# Patient Record
Sex: FEMALE | Race: WHITE | Employment: OTHER | URBAN - METROPOLITAN AREA
[De-identification: names, ages, dates, MRNs, and addresses within clinical notes are randomized per-mention and may not be internally consistent; named-entity substitution may affect disease eponyms.]

---

## 2021-10-26 ENCOUNTER — HOSPITAL ENCOUNTER (INPATIENT)
Age: 86
LOS: 11 days | Discharge: NURSING, REHAB OR LONG-TERM CARE | DRG: 291 | End: 2021-11-06
Attending: HOSPITALIST | Admitting: INTERNAL MEDICINE
Payer: MEDICARE

## 2021-10-26 ENCOUNTER — APPOINTMENT (OUTPATIENT)
Dept: GENERAL RADIOLOGY | Age: 86
DRG: 291 | End: 2021-10-26
Attending: NURSE PRACTITIONER
Payer: MEDICARE

## 2021-10-26 DIAGNOSIS — N13.30 HYDRONEPHROSIS, LEFT: Primary | ICD-10-CM

## 2021-10-26 PROBLEM — J96.01 ACUTE RESPIRATORY FAILURE WITH HYPOXIA (HCC): Status: ACTIVE | Noted: 2021-10-26

## 2021-10-26 LAB
ALBUMIN SERPL-MCNC: 2.9 G/DL (ref 3.5–5)
ALBUMIN/GLOB SERPL: 1 {RATIO} (ref 1.1–2.2)
ALP SERPL-CCNC: 64 U/L (ref 45–117)
ALT SERPL-CCNC: 117 U/L (ref 12–78)
ANION GAP SERPL CALC-SCNC: 5 MMOL/L (ref 5–15)
APTT PPP: 20.4 SEC (ref 22.1–31)
AST SERPL-CCNC: 22 U/L (ref 15–37)
ATRIAL RATE: 65 BPM
B PERT DNA SPEC QL NAA+PROBE: NOT DETECTED
BILIRUB SERPL-MCNC: 0.6 MG/DL (ref 0.2–1)
BNP SERPL-MCNC: ABNORMAL PG/ML
BORDETELLA PARAPERTUSSIS PCR, BORPAR: NOT DETECTED
BUN SERPL-MCNC: 44 MG/DL (ref 6–20)
BUN/CREAT SERPL: 28 (ref 12–20)
C PNEUM DNA SPEC QL NAA+PROBE: NOT DETECTED
CALCIUM SERPL-MCNC: 8.8 MG/DL (ref 8.5–10.1)
CALCULATED R AXIS, ECG10: 20 DEGREES
CALCULATED T AXIS, ECG11: 152 DEGREES
CHLORIDE SERPL-SCNC: 106 MMOL/L (ref 97–108)
CO2 SERPL-SCNC: 28 MMOL/L (ref 21–32)
CREAT SERPL-MCNC: 1.56 MG/DL (ref 0.55–1.02)
DIAGNOSIS, 93000: NORMAL
ERYTHROCYTE [DISTWIDTH] IN BLOOD BY AUTOMATED COUNT: 13 % (ref 11.5–14.5)
FLUAV H1 2009 PAND RNA SPEC QL NAA+PROBE: NOT DETECTED
FLUAV H1 RNA SPEC QL NAA+PROBE: NOT DETECTED
FLUAV H3 RNA SPEC QL NAA+PROBE: NOT DETECTED
FLUAV SUBTYP SPEC NAA+PROBE: NOT DETECTED
FLUBV RNA SPEC QL NAA+PROBE: NOT DETECTED
GLOBULIN SER CALC-MCNC: 2.8 G/DL (ref 2–4)
GLUCOSE BLD STRIP.AUTO-MCNC: 141 MG/DL (ref 65–117)
GLUCOSE BLD STRIP.AUTO-MCNC: 160 MG/DL (ref 65–117)
GLUCOSE BLD STRIP.AUTO-MCNC: 162 MG/DL (ref 65–117)
GLUCOSE BLD STRIP.AUTO-MCNC: 94 MG/DL (ref 65–117)
GLUCOSE SERPL-MCNC: 155 MG/DL (ref 65–100)
HADV DNA SPEC QL NAA+PROBE: NOT DETECTED
HCOV 229E RNA SPEC QL NAA+PROBE: NOT DETECTED
HCOV HKU1 RNA SPEC QL NAA+PROBE: NOT DETECTED
HCOV NL63 RNA SPEC QL NAA+PROBE: NOT DETECTED
HCOV OC43 RNA SPEC QL NAA+PROBE: NOT DETECTED
HCT VFR BLD AUTO: 34.6 % (ref 35–47)
HGB BLD-MCNC: 11.5 G/DL (ref 11.5–16)
HMPV RNA SPEC QL NAA+PROBE: NOT DETECTED
HPIV1 RNA SPEC QL NAA+PROBE: NOT DETECTED
HPIV2 RNA SPEC QL NAA+PROBE: NOT DETECTED
HPIV3 RNA SPEC QL NAA+PROBE: NOT DETECTED
HPIV4 RNA SPEC QL NAA+PROBE: NOT DETECTED
INR PPP: 1.1 (ref 0.9–1.1)
LACTATE SERPL-SCNC: 1.4 MMOL/L (ref 0.4–2)
M PNEUMO DNA SPEC QL NAA+PROBE: NOT DETECTED
MAGNESIUM SERPL-MCNC: 2.4 MG/DL (ref 1.6–2.4)
MCH RBC QN AUTO: 31.9 PG (ref 26–34)
MCHC RBC AUTO-ENTMCNC: 33.2 G/DL (ref 30–36.5)
MCV RBC AUTO: 96.1 FL (ref 80–99)
NRBC # BLD: 0 K/UL (ref 0–0.01)
NRBC BLD-RTO: 0 PER 100 WBC
PHOSPHATE SERPL-MCNC: 4.9 MG/DL (ref 2.6–4.7)
PLATELET # BLD AUTO: 101 K/UL (ref 150–400)
PMV BLD AUTO: 12.3 FL (ref 8.9–12.9)
POTASSIUM SERPL-SCNC: 4.6 MMOL/L (ref 3.5–5.1)
PROCALCITONIN SERPL-MCNC: 0.1 NG/ML
PROT SERPL-MCNC: 5.7 G/DL (ref 6.4–8.2)
PROTHROMBIN TIME: 11.9 SEC (ref 9–11.1)
Q-T INTERVAL, ECG07: 456 MS
QRS DURATION, ECG06: 158 MS
QTC CALCULATION (BEZET), ECG08: 516 MS
RBC # BLD AUTO: 3.6 M/UL (ref 3.8–5.2)
RSV RNA SPEC QL NAA+PROBE: NOT DETECTED
RV+EV RNA SPEC QL NAA+PROBE: NOT DETECTED
SARS-COV-2 PCR, COVPCR: NOT DETECTED
SERVICE CMNT-IMP: ABNORMAL
SERVICE CMNT-IMP: NORMAL
SODIUM SERPL-SCNC: 139 MMOL/L (ref 136–145)
THERAPEUTIC RANGE,PTTT: ABNORMAL SECS (ref 58–77)
TROPONIN-HIGH SENSITIVITY: 87 NG/L (ref 0–51)
VENTRICULAR RATE, ECG03: 77 BPM
WBC # BLD AUTO: 7.5 K/UL (ref 3.6–11)

## 2021-10-26 PROCEDURE — 84100 ASSAY OF PHOSPHORUS: CPT

## 2021-10-26 PROCEDURE — 83880 ASSAY OF NATRIURETIC PEPTIDE: CPT

## 2021-10-26 PROCEDURE — 83605 ASSAY OF LACTIC ACID: CPT

## 2021-10-26 PROCEDURE — 74011250637 HC RX REV CODE- 250/637: Performed by: NURSE PRACTITIONER

## 2021-10-26 PROCEDURE — 65660000000 HC RM CCU STEPDOWN

## 2021-10-26 PROCEDURE — 94660 CPAP INITIATION&MGMT: CPT

## 2021-10-26 PROCEDURE — 74011250636 HC RX REV CODE- 250/636: Performed by: NURSE PRACTITIONER

## 2021-10-26 PROCEDURE — 0202U NFCT DS 22 TRGT SARS-COV-2: CPT

## 2021-10-26 PROCEDURE — 71045 X-RAY EXAM CHEST 1 VIEW: CPT

## 2021-10-26 PROCEDURE — 93005 ELECTROCARDIOGRAM TRACING: CPT

## 2021-10-26 PROCEDURE — 74011636637 HC RX REV CODE- 636/637: Performed by: NURSE PRACTITIONER

## 2021-10-26 PROCEDURE — 85730 THROMBOPLASTIN TIME PARTIAL: CPT

## 2021-10-26 PROCEDURE — 36415 COLL VENOUS BLD VENIPUNCTURE: CPT

## 2021-10-26 PROCEDURE — 85610 PROTHROMBIN TIME: CPT

## 2021-10-26 PROCEDURE — 83735 ASSAY OF MAGNESIUM: CPT

## 2021-10-26 PROCEDURE — 82962 GLUCOSE BLOOD TEST: CPT

## 2021-10-26 PROCEDURE — 77010033678 HC OXYGEN DAILY

## 2021-10-26 PROCEDURE — 84484 ASSAY OF TROPONIN QUANT: CPT

## 2021-10-26 PROCEDURE — 85027 COMPLETE CBC AUTOMATED: CPT

## 2021-10-26 PROCEDURE — 80053 COMPREHEN METABOLIC PANEL: CPT

## 2021-10-26 PROCEDURE — 74011000258 HC RX REV CODE- 258: Performed by: INTERNAL MEDICINE

## 2021-10-26 PROCEDURE — 84145 PROCALCITONIN (PCT): CPT

## 2021-10-26 PROCEDURE — 74011250636 HC RX REV CODE- 250/636: Performed by: INTERNAL MEDICINE

## 2021-10-26 RX ORDER — ACETAMINOPHEN 650 MG/1
650 SUPPOSITORY RECTAL
Status: DISCONTINUED | OUTPATIENT
Start: 2021-10-26 | End: 2021-11-06 | Stop reason: HOSPADM

## 2021-10-26 RX ORDER — ZOLPIDEM TARTRATE 10 MG/1
TABLET ORAL
COMMUNITY
Start: 2021-09-17 | End: 2021-11-06

## 2021-10-26 RX ORDER — POLYETHYLENE GLYCOL 3350 17 G/17G
POWDER, FOR SOLUTION ORAL
COMMUNITY
Start: 2021-07-29

## 2021-10-26 RX ORDER — ALBUTEROL SULFATE 0.83 MG/ML
2.5 SOLUTION RESPIRATORY (INHALATION)
Status: DISCONTINUED | OUTPATIENT
Start: 2021-10-26 | End: 2021-11-06 | Stop reason: HOSPADM

## 2021-10-26 RX ORDER — POLYETHYLENE GLYCOL 3350 17 G/17G
17 POWDER, FOR SOLUTION ORAL DAILY PRN
Status: DISCONTINUED | OUTPATIENT
Start: 2021-10-26 | End: 2021-11-06 | Stop reason: HOSPADM

## 2021-10-26 RX ORDER — ONDANSETRON 2 MG/ML
4 INJECTION INTRAMUSCULAR; INTRAVENOUS
Status: DISCONTINUED | OUTPATIENT
Start: 2021-10-26 | End: 2021-11-06 | Stop reason: HOSPADM

## 2021-10-26 RX ORDER — POLYETHYLENE GLYCOL 3350 17 G/17G
17 POWDER, FOR SOLUTION ORAL DAILY
Status: DISCONTINUED | OUTPATIENT
Start: 2021-10-26 | End: 2021-11-06 | Stop reason: HOSPADM

## 2021-10-26 RX ORDER — ENOXAPARIN SODIUM 100 MG/ML
40 INJECTION SUBCUTANEOUS DAILY
Status: DISCONTINUED | OUTPATIENT
Start: 2021-10-26 | End: 2021-10-26

## 2021-10-26 RX ORDER — SODIUM CHLORIDE 0.9 % (FLUSH) 0.9 %
5-40 SYRINGE (ML) INJECTION AS NEEDED
Status: DISCONTINUED | OUTPATIENT
Start: 2021-10-26 | End: 2021-11-06 | Stop reason: HOSPADM

## 2021-10-26 RX ORDER — SODIUM CHLORIDE 0.9 % (FLUSH) 0.9 %
5-40 SYRINGE (ML) INJECTION EVERY 8 HOURS
Status: DISCONTINUED | OUTPATIENT
Start: 2021-10-26 | End: 2021-11-06 | Stop reason: HOSPADM

## 2021-10-26 RX ORDER — ATENOLOL 25 MG/1
25 TABLET ORAL DAILY
COMMUNITY
Start: 2021-10-01

## 2021-10-26 RX ORDER — PANTOPRAZOLE SODIUM 40 MG/1
40 TABLET, DELAYED RELEASE ORAL DAILY
Status: DISCONTINUED | OUTPATIENT
Start: 2021-10-26 | End: 2021-11-06 | Stop reason: HOSPADM

## 2021-10-26 RX ORDER — PANTOPRAZOLE SODIUM 40 MG/1
40 TABLET, DELAYED RELEASE ORAL DAILY
COMMUNITY
Start: 2021-10-18

## 2021-10-26 RX ORDER — IPRATROPIUM BROMIDE AND ALBUTEROL SULFATE 2.5; .5 MG/3ML; MG/3ML
3 SOLUTION RESPIRATORY (INHALATION)
Status: ON HOLD | COMMUNITY
Start: 2021-06-14 | End: 2021-11-04 | Stop reason: SDUPTHER

## 2021-10-26 RX ORDER — ESCITALOPRAM OXALATE 10 MG/1
10 TABLET ORAL DAILY
COMMUNITY
Start: 2021-09-28 | End: 2022-03-27

## 2021-10-26 RX ORDER — MONTELUKAST SODIUM 10 MG/1
10 TABLET ORAL AT BEDTIME
COMMUNITY
Start: 2021-01-07

## 2021-10-26 RX ORDER — FUROSEMIDE 20 MG/1
20 TABLET ORAL DAILY
Status: DISCONTINUED | OUTPATIENT
Start: 2021-10-27 | End: 2021-10-27

## 2021-10-26 RX ORDER — ALBUTEROL SULFATE 0.83 MG/ML
SOLUTION RESPIRATORY (INHALATION)
COMMUNITY
Start: 2021-08-31 | End: 2021-11-06

## 2021-10-26 RX ORDER — AMLODIPINE BESYLATE 5 MG/1
5 TABLET ORAL DAILY
COMMUNITY
Start: 2021-03-03 | End: 2021-11-06

## 2021-10-26 RX ORDER — ATENOLOL 25 MG/1
25 TABLET ORAL DAILY
Status: DISCONTINUED | OUTPATIENT
Start: 2021-10-26 | End: 2021-11-06 | Stop reason: HOSPADM

## 2021-10-26 RX ORDER — ALBUTEROL SULFATE 0.83 MG/ML
5 SOLUTION RESPIRATORY (INHALATION)
Status: DISCONTINUED | OUTPATIENT
Start: 2021-10-26 | End: 2021-10-26

## 2021-10-26 RX ORDER — FLUTICASONE FUROATE AND VILANTEROL TRIFENATATE 100; 25 UG/1; UG/1
1 POWDER RESPIRATORY (INHALATION) DAILY
COMMUNITY

## 2021-10-26 RX ORDER — ROPINIROLE 1 MG/1
3 TABLET, FILM COATED ORAL 3 TIMES DAILY
Status: DISCONTINUED | OUTPATIENT
Start: 2021-10-26 | End: 2021-11-06 | Stop reason: HOSPADM

## 2021-10-26 RX ORDER — VALSARTAN 320 MG/1
320 TABLET ORAL DAILY
COMMUNITY
Start: 2021-02-15 | End: 2021-11-06

## 2021-10-26 RX ORDER — INSULIN LISPRO 100 [IU]/ML
INJECTION, SOLUTION INTRAVENOUS; SUBCUTANEOUS EVERY 6 HOURS
Status: DISCONTINUED | OUTPATIENT
Start: 2021-10-26 | End: 2021-10-26

## 2021-10-26 RX ORDER — ROPINIROLE 3 MG/1
TABLET, FILM COATED ORAL
COMMUNITY
Start: 2020-12-29

## 2021-10-26 RX ORDER — MAGNESIUM SULFATE 100 %
4 CRYSTALS MISCELLANEOUS AS NEEDED
Status: DISCONTINUED | OUTPATIENT
Start: 2021-10-26 | End: 2021-11-06 | Stop reason: HOSPADM

## 2021-10-26 RX ORDER — FUROSEMIDE 20 MG/1
TABLET ORAL
Status: ON HOLD | COMMUNITY
Start: 2021-10-04 | End: 2021-10-26

## 2021-10-26 RX ORDER — ACETAMINOPHEN 325 MG/1
TABLET ORAL
COMMUNITY

## 2021-10-26 RX ORDER — MONTELUKAST SODIUM 10 MG/1
10 TABLET ORAL
Status: DISCONTINUED | OUTPATIENT
Start: 2021-10-26 | End: 2021-11-06 | Stop reason: HOSPADM

## 2021-10-26 RX ORDER — KETOCONAZOLE 20 MG/ML
SHAMPOO TOPICAL
COMMUNITY
Start: 2021-07-29

## 2021-10-26 RX ORDER — DEXTROSE 50 % IN WATER (D50W) INTRAVENOUS SYRINGE
12.5-25 AS NEEDED
Status: DISCONTINUED | OUTPATIENT
Start: 2021-10-26 | End: 2021-11-06 | Stop reason: HOSPADM

## 2021-10-26 RX ORDER — ONDANSETRON 4 MG/1
TABLET, FILM COATED ORAL
COMMUNITY
Start: 2021-07-22 | End: 2021-11-06

## 2021-10-26 RX ORDER — INSULIN LISPRO 100 [IU]/ML
INJECTION, SOLUTION INTRAVENOUS; SUBCUTANEOUS
Status: DISCONTINUED | OUTPATIENT
Start: 2021-10-26 | End: 2021-11-06 | Stop reason: HOSPADM

## 2021-10-26 RX ORDER — DICLOFENAC SODIUM 10 MG/G
GEL TOPICAL
COMMUNITY
Start: 2021-05-05

## 2021-10-26 RX ORDER — FUROSEMIDE 10 MG/ML
20 INJECTION INTRAMUSCULAR; INTRAVENOUS DAILY
Status: COMPLETED | OUTPATIENT
Start: 2021-10-26 | End: 2021-10-26

## 2021-10-26 RX ORDER — ACETAMINOPHEN 325 MG/1
650 TABLET ORAL
Status: DISCONTINUED | OUTPATIENT
Start: 2021-10-26 | End: 2021-11-06 | Stop reason: HOSPADM

## 2021-10-26 RX ADMIN — SODIUM CHLORIDE 10 ML: 9 INJECTION, SOLUTION INTRAMUSCULAR; INTRAVENOUS; SUBCUTANEOUS at 01:00

## 2021-10-26 RX ADMIN — INSULIN LISPRO 3 UNITS: 100 INJECTION, SOLUTION INTRAVENOUS; SUBCUTANEOUS at 12:12

## 2021-10-26 RX ADMIN — CEFEPIME HYDROCHLORIDE 2 G: 2 INJECTION, POWDER, FOR SOLUTION INTRAVENOUS at 03:31

## 2021-10-26 RX ADMIN — ATENOLOL 25 MG: 25 TABLET ORAL at 08:15

## 2021-10-26 RX ADMIN — APIXABAN 2.5 MG: 2.5 TABLET, FILM COATED ORAL at 22:33

## 2021-10-26 RX ADMIN — AZITHROMYCIN MONOHYDRATE 500 MG: 500 INJECTION, POWDER, LYOPHILIZED, FOR SOLUTION INTRAVENOUS at 03:34

## 2021-10-26 RX ADMIN — SODIUM CHLORIDE 10 ML: 9 INJECTION, SOLUTION INTRAMUSCULAR; INTRAVENOUS; SUBCUTANEOUS at 22:33

## 2021-10-26 RX ADMIN — METHYLPREDNISOLONE SODIUM SUCCINATE 60 MG: 40 INJECTION, POWDER, FOR SOLUTION INTRAMUSCULAR; INTRAVENOUS at 09:00

## 2021-10-26 RX ADMIN — ROPINIROLE HYDROCHLORIDE 3 MG: 1 TABLET, FILM COATED ORAL at 08:15

## 2021-10-26 RX ADMIN — ROPINIROLE HYDROCHLORIDE 3 MG: 1 TABLET, FILM COATED ORAL at 16:17

## 2021-10-26 RX ADMIN — ROPINIROLE HYDROCHLORIDE 3 MG: 1 TABLET, FILM COATED ORAL at 22:33

## 2021-10-26 RX ADMIN — FUROSEMIDE 20 MG: 10 INJECTION, SOLUTION INTRAMUSCULAR; INTRAVENOUS at 08:15

## 2021-10-26 RX ADMIN — MONTELUKAST 10 MG: 10 TABLET, FILM COATED ORAL at 22:33

## 2021-10-26 RX ADMIN — SODIUM CHLORIDE 10 ML: 9 INJECTION, SOLUTION INTRAMUSCULAR; INTRAVENOUS; SUBCUTANEOUS at 16:17

## 2021-10-26 RX ADMIN — PANTOPRAZOLE SODIUM 40 MG: 40 TABLET, DELAYED RELEASE ORAL at 08:15

## 2021-10-26 RX ADMIN — POLYETHYLENE GLYCOL 3350 17 G: 17 POWDER, FOR SOLUTION ORAL at 08:27

## 2021-10-26 RX ADMIN — APIXABAN 2.5 MG: 2.5 TABLET, FILM COATED ORAL at 08:13

## 2021-10-26 NOTE — PROGRESS NOTES
Problem: Pressure Injury - Risk of  Goal: *Prevention of pressure injury  Description: Document Malik Scale and appropriate interventions in the flowsheet. Outcome: Progressing Towards Goal  Note: Pressure Injury Interventions:  Sensory Interventions: Turn and reposition approx. every two hours (pillows and wedges if needed)    Moisture Interventions: Absorbent underpads, Check for incontinence Q2 hours and as needed, Maintain skin hydration (lotion/cream)    Activity Interventions: Pressure redistribution bed/mattress(bed type)    Mobility Interventions: PT/OT evaluation, Pressure redistribution bed/mattress (bed type), HOB 30 degrees or less    Nutrition Interventions: Document food/fluid/supplement intake    Friction and Shear Interventions: HOB 30 degrees or less                Problem: Risk for Spread of Infection  Goal: Prevent transmission of infectious organism to others  Description: Prevent the transmission of infectious organisms to other patients, staff members, and visitors.   Outcome: Progressing Towards Goal

## 2021-10-26 NOTE — PROGRESS NOTES
Pharmacy Medication Reconciliation     The patient was interviewed regarding current PTA medication list, use and drug allergies. The patient was questioned regarding use of any other inhalers, topical products, over the counter medications, herbal medications, vitamin products or ophthalmic/nasal/otic medication use. Allergy Update: Pcn [penicillins] and Sulfa (sulfonamide antibiotics)    Recommendations/Findings: The following amendments were made to the patient's active medication list on file at [address]:   1) Additions: acetaminophen, vitamin B-12    2) Deletions: furosemide     3) Changes: none      Pertinent Findings: patient did not know B-12 dose, and says she may take montelukast but is not completely sure    Clarified PTA med list with patient. PTA medication list was corrected to the following:     Prior to Admission Medications   Prescriptions Last Dose Informant Taking?   acetaminophen (TYLENOL) 325 mg tablet 10/25/2021 at Unknown time Self Yes   Sig: Take (1/2) tablet in the morning and (1/2) tablet at nights   albuterol (PROVENTIL VENTOLIN) 2.5 mg /3 mL (0.083 %) nebu 10/25/2021 at Unknown time Self Yes   Sig: INHALE 3ML BY NEBULIZATION 4 TIMES A DAY AS NEEDED   albuterol-ipratropium (DUO-NEB) 2.5 mg-0.5 mg/3 ml nebu 10/25/2021 at Unknown time Self Yes   Sig: Take 3 mL by inhalation. amLODIPine (NORVASC) 5 mg tablet 10/25/2021 at Unknown time Self Yes   Sig: Take 5 mg by mouth daily. apixaban (ELIQUIS) 5 mg tablet 10/25/2021 at Unknown time Self Yes   Sig: Take 5 mg by mouth two (2) times a day. atenoloL (TENORMIN) 25 mg tablet 10/25/2021 at Unknown time Self Yes   Sig: Take 25 mg by mouth daily. cyanocobalamin, vitamin B-12, (VITAMIN B12 PO) 10/25/2021 at Unknown time Self Yes   Sig: Take  by mouth. diclofenac (VOLTAREN) 1 % gel 9/26/2021 at Unknown time Self Yes   Sig: Apply  to affected area.    escitalopram oxalate (LEXAPRO) 10 mg tablet 10/19/2021 at Unknown time Self Yes   Sig: Take 10 mg by mouth daily. fluticasone furoate-vilanteroL (Breo Ellipta) 100-25 mcg/dose inhaler 10/25/2021 at Unknown time Self Yes   Sig: Take 1 Puff by inhalation daily. ketoconazole (NIZORAL) 2 % shampoo 10/19/2021 at Unknown time Self Yes   Sig: Shampoo with weekly 1-2 times. montelukast (SINGULAIR) 10 mg tablet 10/25/2021 at Unknown time Self Yes   Sig: Take 10 mg by mouth At bedtime. ondansetron hcl (ZOFRAN) 4 mg tablet Unknown at Unknown time Self No   Sig: TAKE 1 TABLET(4 MG) BY MOUTH EVERY 8 HOURS FOR UP TO 10 DAYS AS NEEDED FOR NAUSEA OR VOMITING   pantoprazole (PROTONIX) 40 mg tablet 10/19/2021 at Unknown time Self Yes   Sig: Take 40 mg by mouth daily. polyethylene glycol (MIRALAX) 17 gram/dose powder 2021 at Unknown time Self Yes   Si scoop po daily in a drink prn constipation. rOPINIRole (REQUIP) 3 mg tab tab 10/25/2021 at Unknown time Self Yes   Sig: TAKE 1 TABLET FOUR TIMES A DAY   valsartan (DIOVAN) 320 mg tablet 10/25/2021 at Unknown time Self Yes   Sig: Take 320 mg by mouth daily. zolpidem (AMBIEN) 10 mg tablet 10/25/2021 at Unknown time Self Yes   Si/2 to 1 po qhs prn insomnia. Facility-Administered Medications: None        Thank you,  Jamar Staton PharmD.  Candidate

## 2021-10-26 NOTE — PROGRESS NOTES
Transition of Care Plan:    RUR:  12%   Disposition: Home with Son who provides 24/7 supervision  Follow up appointments: PCP, Specialists  DME needed: Pt has a walker. Transportation at Discharge: Pt's sonwill transport. Keys or means to access home:      Son will provide. IM Medicare Letter: needed at d/c  Is patient a BCPI-A Bundle:    n/a       If yes, was Bundle Letter given?:   n/a  Caregiver Contact:Micheal Stanforddayne [phone]  Discharge Caregiver contacted prior to discharge? CM will contact prior to d/c.    Reason for Admission:  Acute Respiratory Failure with Hypoxia, Atrial Fibrillation, Community Acquired Pneumonia, and Heart Failure Exacerbation                     RUR Score: 12%                      Plan for utilizing home health:  As needed        PCP: First and Last name:  Pat Ge MD     Name of Practice:    Are you a current patient: Yes/No: yes   Approximate date of last visit: last week    Can you participate in a virtual visit with your PCP: Prefers in office visits                    Current Advanced Directive/Advance Care Plan: DNR    Laura 13 (ACP) Conversation      Date of Conversation: 10/26/21  Conducted with: Patient with Decision Making Capacity and Healthcare Decision Maker: Named in Advance Directive or Healthcare Power of  Chap Salomon:   No healthcare decision makers have been documented. Click here to complete [address] including selection of the Healthcare Decision Maker Relationship (ie \"Primary\")    Content/Action Overview:    Has ACP document(s) NOT on file - requested patient to provide  Reviewed DNR/DNI and patient confirms current DNR status - completed forms on file (place new order if needed)    Length of Voluntary ACP Conversation in minutes:  <16 minutes (Non-Billable)    Dalia Greenwood                           Transition of Care Plan:   Home with son who provides 24/7 supervision. CM met with pt's son via phone to introduce self/role, verify demographics, insurance and PCP. CM also discussed d/c plan. Pt is a 79 yo, , , female who was admitted to [facility] on 10/26/21 with the above dxs. Pt sees her PCP every 3 months. Pt obtains prescriptions from the [address] [place] [address]. Pt lives with her son in a one fl home with 3 BOO. Pt also has a supportive great nephew who lives nearby. Pt uses a walker. Pt does not drive. Pt needs assistance with her ADL care. Pt has a hx of HH through [facility]. Pt has a hx of SNF at [facility]. Pt has not been in an IPR. Pt's son will transport at d/c. CM will continue to assess for d/c needs. Care Management Interventions  PCP Verified by CM: Yes (Pt sees Dr. Cristiano Sutton. )  Palliative Care Criteria Met (RRAT>21 & CHF Dx)?: No  Mode of Transport at Discharge:  Other (see comment) (Pt's godson will transport at d/c.)  Transition of Care Consult (CM Consult): Discharge Planning  Discharge Durable Medical Equipment: No (Pt has a walker. )  Physical Therapy Consult: No  Occupational Therapy Consult: No  Speech Therapy Consult: No  Support Systems: Child(james), Other Family Member(s) (Pt has a supportive godson and great nephew. )  Confirm Follow Up Transport: Family  The Patient and/or Patient Representative was Provided with a Choice of Provider and Agrees with the Discharge Plan?: Yes  Name of the Patient Representative Who was Provided with a Choice of Provider and Agrees with the Discharge Plan: Skylar Martines  Discharge Location  Discharge Placement: Home with family assistance    VICK Hewitt  Care Management, [address] [place] [address]

## 2021-10-26 NOTE — PROGRESS NOTES
Pt is now comfortable on NC O2 @ 2 LPM. Cognition appears to be intact. Exam reveals AF with controlled rate (71/min), no wheezes, bibasilar crackles, symmetric LE edema. Se can now be safely transferred to [facility] floor. I have discontinued systemic steroids (doubt that this is COPD exacerbation) and antibiotics (doubt PNA). I have changed furosemide to PO dosing. After transfer, [facility] will assume her care and PCCM will sign off.  Discussed with Dr Elida Dinh, [facility],[address] Floor  [phone]  10/26/2021 12:06 PM

## 2021-10-26 NOTE — PROGRESS NOTES
80 y.o. female who has a PMH of HF (EF unknown), (COPD, stage unknown, does not wear oxygen at home), Afib with RVR, CKD stage 3, Previous TAVR, anxiety, HLD, and DM. She presented to the ED at [facility] with shortness of breath that had been worsening over a period of about 1 week accompanied by increasing BLE edema. She demonstrated oxygen saturations to the mid 80s that improved on NRB. She continued to exhibit increased WOB and was placed on BiPAP. She was given lasix, methylpred, levofloxacin, and ceftriaxone. COVID negative w/ blood cultures & full respiratory panel pending. Pt received on bipap @ 45%; A & 0 x4; *1 PIV; PureWick. COVID negative w/ blood cultures & full respiratory panel pending; NPO. Pt is resting comfortably in bed. Shift report included the following information SBAR, Kardex, ED Summary, Procedure Summary, Intake/Output, MAR, Accordion, Recent Results, Med Rec Status, Cardiac Rhythm and alarm parameters. BG checks/insulin administration per orders. No acute events overnight. Continue to monitor.

## 2021-10-26 NOTE — PROGRESS NOTES
Shift Summary:  Received in bed, asleep, BIPAP in place. Patient reports feeling better, states lives with friend Harvinder Marks who she identifies as caregiver. Patient ambulates with walker, states she does most ADLS sitting down. Takes her medication with apple sauce and eats a regular diet. Bedside swallow exam completed, patient able to swallow liquids and solids, no cough, drooling or distress noted. Medications administered as ordered. Patient tolerated well. Patient transitioned to NC per MD orders, tolerated well. OOB to chair for approximately 3 hours. Loose stools noted, patient received Miralax this AM, she peck snot usually take anything for her bowels. MD advised and scheduled Miralax held. Patient able to transfer in and out of bed with one person assistance. Able to feed self, requires set-up assistance. Visiting with family at this time. Saturating in 90's on RA.

## 2021-10-26 NOTE — PROGRESS NOTES
apixaban 5 mg po bid for afib,  Pt age=89,  Pt wt=76.3 kg,  Pt scr=1.56.   apixaban dosing adjusted to 2.5 mg po bid as per protocol.  Rai Ng, ANNID

## 2021-10-26 NOTE — H&P
History and Physical    Patient: Sheila Ball MRN: [id number]  SSN: [id number]    YOB: 1927  Age: 80 y.o. Sex: female      Subjective:      Sheila Ball is a 80 y.o. female who has a PMH of HF (EF unknown), (COPD, stage unknown, does not wear oxygen at home), Afib with RVR, CKD stage 3, Previous TAVR, anxiety, HLD, and DM. She presented to the ED at [facility] with shortness of breath that had been worsening over a period of about 1 week accompanied by increasing BLE edema. She demonstrated oxygen saturations to the mid 80s that improved on NRB. She continued to exhibit increased WOB and was placed on BiPAP. She was given lasix, methylpred, levofloxacin, and ceftriaxone. She was transferred to [facility] for further workup and management. Upon arrival, I queried the patient about code status and she informed me that she has an advanced directive stating that she is a DNR/DNI. She does not have the advanced directive on hand, but does wish to be a DNR/DNI. The patient arrived on BiPAP 40% 10/5 with sats %. She reported her WOB was improved. Her breathing did not appear labored. She was not tachypneic. VSS. Medications reviewed. PMH: As per HPI  PSH: Hip arthroplasty 6/2001; Hysterectomy; prior joint replacement; TAVR 1/2019    PFH: HTN/Kidney disease- father    Social History     Tobacco Use    Smoking status: Not on file   Substance Use Topics    Alcohol use: Not on file      Prior to Admission medications    Medication Sig Start Date End Date Taking? Authorizing Provider   albuterol (PROVENTIL VENTOLIN) 2.5 mg /3 mL (0.083 %) nebu INHALE 3ML BY NEBULIZATION 4 TIMES A DAY AS NEEDED 8/31/21  Yes Provider, Historical   amLODIPine (NORVASC) 5 mg tablet Take 5 mg by mouth daily. 3/3/21  Yes Provider, Historical   atenoloL (TENORMIN) 25 mg tablet Take 25 mg by mouth daily. 10/1/21  Yes Provider, Historical   diclofenac (VOLTAREN) 1 % gel Apply  to affected area.  5/5/21 Yes Provider, Historical   escitalopram oxalate (LEXAPRO) 10 mg tablet Take 10 mg by mouth daily. 9/28/21 3/27/22 Yes Provider, Historical   furosemide (LASIX) 20 mg tablet TAKE 1 TABLET DAILY ON MONDAY, WEDNESDAY AND FRIDAY OR AS DIRECTED 10/4/21  Yes Provider, Historical   albuterol-ipratropium (DUO-NEB) 2.5 mg-0.5 mg/3 ml nebu Take 3 mL by inhalation. 6/14/21 6/14/22 Yes Provider, Historical   ketoconazole (NIZORAL) 2 % shampoo Shampoo with weekly 1-2 times. 7/29/21  Yes Provider, Historical   montelukast (SINGULAIR) 10 mg tablet Take 10 mg by mouth At bedtime. 1/7/21  Yes Provider, Historical   ondansetron hcl (ZOFRAN) 4 mg tablet TAKE 1 TABLET(4 MG) BY MOUTH EVERY 8 HOURS FOR UP TO 10 DAYS AS NEEDED FOR NAUSEA OR VOMITING 7/22/21  Yes Provider, Historical   pantoprazole (PROTONIX) 40 mg tablet Take 40 mg by mouth daily. 10/18/21  Yes Provider, Historical   polyethylene glycol (MIRALAX) 17 gram/dose powder 1 scoop po daily in a drink prn constipation. 7/29/21  Yes Provider, Historical   rOPINIRole (REQUIP) 3 mg tab tab TAKE 1 TABLET FOUR TIMES A DAY 12/29/20  Yes Provider, Historical   valsartan (DIOVAN) 320 mg tablet Take 320 mg by mouth daily. 2/15/21  Yes Provider, Historical   zolpidem (AMBIEN) 10 mg tablet 1/2 to 1 po qhs prn insomnia. 9/17/21  Yes Provider, Historical   apixaban (ELIQUIS) 5 mg tablet Take 5 mg by mouth two (2) times a day.    Yes Provider, Historical     Review of Systems:  As per HPI    Objective:     Vitals:    10/26/21 0032 10/26/21 0040   BP:  138/63   Pulse:  76   Resp:  19   Temp:  98.4 °F (36.9 °C)   SpO2: 99% 99%      Physical Exam:  GENERAL: alert, cooperative, no distress, appears stated age  EYE: PERRLA  THROAT & NECK: Supple, no JVD  LUNG: On BiPAP, CTAP, no wheezing  HEART: Irregular rate and rhythm, 2+ pulses, 2+ edema of BLE to thighs  ABDOMEN: soft, nontender, nondistended  EXTREMITIES:  2+ pulses, 2+ edema of BLE  SKIN:c/d/i  NEUROLOGIC: Awake, alert, oriented x4; nonfocal  PSYCHIATRIC: Non-anxious appearing, situationally appropriate, conversant     [facility] labs: Pending    [facility] labs:  WBC 12  H/H 12.9/39  Plt 123    K 4  BUN/Cr 42/1.47    CXR: Ordered and pending    RVP: Ordered and pending    ECG at OSH: Afib with controlled rate  ECG at [address]: Pending    Assessment and Plan:     Acute Hypoxic Respiratory Failure: Leading ddx includes COPD exacerbation and HF w exacerbation:  - Awaiting BNP/Trop levels  - Will likely require additional diuresis; awaiting BMP  - Follow up admission CXR; pending  - Follow up RVP  - Empiric cefepime and azithro   - Blood cultures   - Plan to continue BiPAP overnight    Afib:   - Continue BB  - Continue eliquis    CKD stage 3: BUN/Cr 42/1.47.  - BMP pending     Hyperglycemia:   - SSI    GERD: PPI    Restless leg syndrome: Continue requip    Deconditioning: PT/OT when able    Rosaholly Suleman 23  I had a face to face encounter with the patient, reviewed and interpreted patient data including clinical events, labs, images, vital signs, I/O's, and examined patient. I have discussed the case and the plan and management of the patient's care with the consulting services, the bedside nurses and the respiratory therapist.     NOTE OF PERSONAL INVOLVEMENT IN CARE   This patient has a high probability of imminent, clinically significant deterioration, which requires the highest level of preparedness to intervene urgently. I participated in the decision-making and personally managed or directed the management of the following life and organ supporting interventions that required my frequent assessment to treat or prevent imminent deterioration. I personally spent 60 minutes of critical care time. This is time spent at this critically ill patient's bedside actively involved in patient care as well as the coordination of care and discussions with the patient's family.   This does not include any procedural time which has been billed separately.     Bennett Alexander NP  [facility]  10/26/2021        Signed By: Bennett Alexander NP     October 26, 2021

## 2021-10-27 ENCOUNTER — APPOINTMENT (OUTPATIENT)
Dept: GENERAL RADIOLOGY | Age: 86
DRG: 291 | End: 2021-10-27
Attending: INTERNAL MEDICINE
Payer: MEDICARE

## 2021-10-27 ENCOUNTER — APPOINTMENT (OUTPATIENT)
Dept: ULTRASOUND IMAGING | Age: 86
DRG: 291 | End: 2021-10-27
Attending: INTERNAL MEDICINE
Payer: MEDICARE

## 2021-10-27 LAB
ANION GAP SERPL CALC-SCNC: 6 MMOL/L (ref 5–15)
APPEARANCE UR: CLEAR
BACTERIA URNS QL MICRO: NEGATIVE /HPF
BASOPHILS # BLD: 0 K/UL (ref 0–0.1)
BASOPHILS # BLD: 0 K/UL (ref 0–0.1)
BASOPHILS NFR BLD: 0 % (ref 0–1)
BASOPHILS NFR BLD: 0 % (ref 0–1)
BILIRUB UR QL: NEGATIVE
BNP SERPL-MCNC: ABNORMAL PG/ML
BUN SERPL-MCNC: 50 MG/DL (ref 6–20)
BUN/CREAT SERPL: 33 (ref 12–20)
CALCIUM SERPL-MCNC: 8.7 MG/DL (ref 8.5–10.1)
CALCULATED R AXIS, ECG10: 11 DEGREES
CALCULATED T AXIS, ECG11: 180 DEGREES
CHLORIDE SERPL-SCNC: 106 MMOL/L (ref 97–108)
CO2 SERPL-SCNC: 28 MMOL/L (ref 21–32)
COLOR UR: NORMAL
CREAT SERPL-MCNC: 1.51 MG/DL (ref 0.55–1.02)
DIAGNOSIS, 93000: NORMAL
DIFFERENTIAL METHOD BLD: ABNORMAL
DIFFERENTIAL METHOD BLD: ABNORMAL
EOSINOPHIL # BLD: 0 K/UL (ref 0–0.4)
EOSINOPHIL # BLD: 0 K/UL (ref 0–0.4)
EOSINOPHIL NFR BLD: 0 % (ref 0–7)
EOSINOPHIL NFR BLD: 0 % (ref 0–7)
EPITH CASTS URNS QL MICRO: NORMAL /LPF
ERYTHROCYTE [DISTWIDTH] IN BLOOD BY AUTOMATED COUNT: 13.2 % (ref 11.5–14.5)
ERYTHROCYTE [DISTWIDTH] IN BLOOD BY AUTOMATED COUNT: 13.3 % (ref 11.5–14.5)
GLUCOSE BLD STRIP.AUTO-MCNC: 131 MG/DL (ref 65–117)
GLUCOSE BLD STRIP.AUTO-MCNC: 173 MG/DL (ref 65–117)
GLUCOSE BLD STRIP.AUTO-MCNC: 176 MG/DL (ref 65–117)
GLUCOSE BLD STRIP.AUTO-MCNC: 225 MG/DL (ref 65–117)
GLUCOSE SERPL-MCNC: 135 MG/DL (ref 65–100)
GLUCOSE UR STRIP.AUTO-MCNC: NEGATIVE MG/DL
HCT VFR BLD AUTO: 34.8 % (ref 35–47)
HCT VFR BLD AUTO: 44.5 % (ref 35–47)
HGB BLD-MCNC: 11.4 G/DL (ref 11.5–16)
HGB BLD-MCNC: 14.6 G/DL (ref 11.5–16)
HGB UR QL STRIP: NEGATIVE
IMM GRANULOCYTES # BLD AUTO: 0.1 K/UL (ref 0–0.04)
IMM GRANULOCYTES # BLD AUTO: 0.2 K/UL (ref 0–0.04)
IMM GRANULOCYTES NFR BLD AUTO: 1 % (ref 0–0.5)
IMM GRANULOCYTES NFR BLD AUTO: 1 % (ref 0–0.5)
KETONES UR QL STRIP.AUTO: NEGATIVE MG/DL
LACTATE SERPL-SCNC: 2 MMOL/L (ref 0.4–2)
LEUKOCYTE ESTERASE UR QL STRIP.AUTO: NEGATIVE
LYMPHOCYTES # BLD: 1 K/UL (ref 0.8–3.5)
LYMPHOCYTES # BLD: 2.6 K/UL (ref 0.8–3.5)
LYMPHOCYTES NFR BLD: 14 % (ref 12–49)
LYMPHOCYTES NFR BLD: 9 % (ref 12–49)
MAGNESIUM SERPL-MCNC: 2.4 MG/DL (ref 1.6–2.4)
MCH RBC QN AUTO: 31.4 PG (ref 26–34)
MCH RBC QN AUTO: 31.7 PG (ref 26–34)
MCHC RBC AUTO-ENTMCNC: 32.8 G/DL (ref 30–36.5)
MCHC RBC AUTO-ENTMCNC: 32.8 G/DL (ref 30–36.5)
MCV RBC AUTO: 95.7 FL (ref 80–99)
MCV RBC AUTO: 96.7 FL (ref 80–99)
MONOCYTES # BLD: 0.7 K/UL (ref 0–1)
MONOCYTES # BLD: 1.3 K/UL (ref 0–1)
MONOCYTES NFR BLD: 6 % (ref 5–13)
MONOCYTES NFR BLD: 7 % (ref 5–13)
NEUTS SEG # BLD: 14.3 K/UL (ref 1.8–8)
NEUTS SEG # BLD: 9.2 K/UL (ref 1.8–8)
NEUTS SEG NFR BLD: 78 % (ref 32–75)
NEUTS SEG NFR BLD: 84 % (ref 32–75)
NITRITE UR QL STRIP.AUTO: NEGATIVE
NRBC # BLD: 0 K/UL (ref 0–0.01)
NRBC # BLD: 0 K/UL (ref 0–0.01)
NRBC BLD-RTO: 0 PER 100 WBC
NRBC BLD-RTO: 0 PER 100 WBC
PH UR STRIP: 5 [PH] (ref 5–8)
PLATELET # BLD AUTO: 111 K/UL (ref 150–400)
PLATELET # BLD AUTO: 161 K/UL (ref 150–400)
PMV BLD AUTO: 11.9 FL (ref 8.9–12.9)
PMV BLD AUTO: 12.3 FL (ref 8.9–12.9)
POTASSIUM SERPL-SCNC: 4.4 MMOL/L (ref 3.5–5.1)
PROCALCITONIN SERPL-MCNC: 0.27 NG/ML
PROT UR STRIP-MCNC: NEGATIVE MG/DL
Q-T INTERVAL, ECG07: 360 MS
QRS DURATION, ECG06: 160 MS
QTC CALCULATION (BEZET), ECG08: 493 MS
RBC # BLD AUTO: 3.6 M/UL (ref 3.8–5.2)
RBC # BLD AUTO: 4.65 M/UL (ref 3.8–5.2)
RBC #/AREA URNS HPF: NORMAL /HPF (ref 0–5)
RBC MORPH BLD: ABNORMAL
SERVICE CMNT-IMP: ABNORMAL
SODIUM SERPL-SCNC: 140 MMOL/L (ref 136–145)
SP GR UR REFRACTOMETRY: 1.01 (ref 1–1.03)
UA: UC IF INDICATED,UAUC: NORMAL
UROBILINOGEN UR QL STRIP.AUTO: 0.2 EU/DL (ref 0.2–1)
VENTRICULAR RATE, ECG03: 113 BPM
WBC # BLD AUTO: 11.1 K/UL (ref 3.6–11)
WBC # BLD AUTO: 18.4 K/UL (ref 3.6–11)
WBC MORPH BLD: ABNORMAL
WBC URNS QL MICRO: NORMAL /HPF (ref 0–4)

## 2021-10-27 PROCEDURE — 74011250637 HC RX REV CODE- 250/637: Performed by: INTERNAL MEDICINE

## 2021-10-27 PROCEDURE — 85025 COMPLETE CBC W/AUTO DIFF WBC: CPT

## 2021-10-27 PROCEDURE — 82962 GLUCOSE BLOOD TEST: CPT

## 2021-10-27 PROCEDURE — 36415 COLL VENOUS BLD VENIPUNCTURE: CPT

## 2021-10-27 PROCEDURE — 93005 ELECTROCARDIOGRAM TRACING: CPT

## 2021-10-27 PROCEDURE — 87040 BLOOD CULTURE FOR BACTERIA: CPT

## 2021-10-27 PROCEDURE — 65660000000 HC RM CCU STEPDOWN

## 2021-10-27 PROCEDURE — 74011000250 HC RX REV CODE- 250: Performed by: NURSE PRACTITIONER

## 2021-10-27 PROCEDURE — 77010033678 HC OXYGEN DAILY

## 2021-10-27 PROCEDURE — 74011636637 HC RX REV CODE- 636/637: Performed by: NURSE PRACTITIONER

## 2021-10-27 PROCEDURE — 74011250636 HC RX REV CODE- 250/636: Performed by: NURSE PRACTITIONER

## 2021-10-27 PROCEDURE — 74011250637 HC RX REV CODE- 250/637: Performed by: NURSE PRACTITIONER

## 2021-10-27 PROCEDURE — 80048 BASIC METABOLIC PNL TOTAL CA: CPT

## 2021-10-27 PROCEDURE — 74011250636 HC RX REV CODE- 250/636: Performed by: INTERNAL MEDICINE

## 2021-10-27 PROCEDURE — 94640 AIRWAY INHALATION TREATMENT: CPT

## 2021-10-27 PROCEDURE — 76770 US EXAM ABDO BACK WALL COMP: CPT

## 2021-10-27 PROCEDURE — 81001 URINALYSIS AUTO W/SCOPE: CPT

## 2021-10-27 PROCEDURE — 71045 X-RAY EXAM CHEST 1 VIEW: CPT

## 2021-10-27 PROCEDURE — 84145 PROCALCITONIN (PCT): CPT

## 2021-10-27 PROCEDURE — 74011000250 HC RX REV CODE- 250: Performed by: INTERNAL MEDICINE

## 2021-10-27 PROCEDURE — 83605 ASSAY OF LACTIC ACID: CPT

## 2021-10-27 PROCEDURE — 83880 ASSAY OF NATRIURETIC PEPTIDE: CPT

## 2021-10-27 PROCEDURE — 83735 ASSAY OF MAGNESIUM: CPT

## 2021-10-27 RX ORDER — FUROSEMIDE 10 MG/ML
40 INJECTION INTRAMUSCULAR; INTRAVENOUS 2 TIMES DAILY
Status: DISCONTINUED | OUTPATIENT
Start: 2021-10-27 | End: 2021-11-01

## 2021-10-27 RX ORDER — MORPHINE SULFATE 2 MG/ML
1 INJECTION, SOLUTION INTRAMUSCULAR; INTRAVENOUS
Status: DISCONTINUED | OUTPATIENT
Start: 2021-10-27 | End: 2021-11-06 | Stop reason: HOSPADM

## 2021-10-27 RX ORDER — LANOLIN ALCOHOL/MO/W.PET/CERES
6 CREAM (GRAM) TOPICAL
Status: DISCONTINUED | OUTPATIENT
Start: 2021-10-27 | End: 2021-11-06 | Stop reason: HOSPADM

## 2021-10-27 RX ORDER — DILTIAZEM HYDROCHLORIDE 5 MG/ML
5 INJECTION INTRAVENOUS ONCE
Status: COMPLETED | OUTPATIENT
Start: 2021-10-27 | End: 2021-10-27

## 2021-10-27 RX ORDER — METOPROLOL TARTRATE 5 MG/5ML
5 INJECTION INTRAVENOUS
Status: DISCONTINUED | OUTPATIENT
Start: 2021-10-27 | End: 2021-11-06 | Stop reason: HOSPADM

## 2021-10-27 RX ORDER — ARFORMOTEROL TARTRATE 15 UG/2ML
15 SOLUTION RESPIRATORY (INHALATION)
Status: DISCONTINUED | OUTPATIENT
Start: 2021-10-27 | End: 2021-11-06 | Stop reason: HOSPADM

## 2021-10-27 RX ORDER — LIDOCAINE 4 G/100G
1 PATCH TOPICAL EVERY 24 HOURS
Status: DISCONTINUED | OUTPATIENT
Start: 2021-10-27 | End: 2021-11-06 | Stop reason: HOSPADM

## 2021-10-27 RX ORDER — BUDESONIDE 0.25 MG/2ML
250 INHALANT ORAL
Status: DISCONTINUED | OUTPATIENT
Start: 2021-10-27 | End: 2021-11-06 | Stop reason: HOSPADM

## 2021-10-27 RX ADMIN — APIXABAN 2.5 MG: 2.5 TABLET, FILM COATED ORAL at 09:01

## 2021-10-27 RX ADMIN — ROPINIROLE HYDROCHLORIDE 3 MG: 1 TABLET, FILM COATED ORAL at 18:52

## 2021-10-27 RX ADMIN — MORPHINE SULFATE 1 MG: 2 INJECTION, SOLUTION INTRAMUSCULAR; INTRAVENOUS at 14:42

## 2021-10-27 RX ADMIN — INSULIN LISPRO 4 UNITS: 100 INJECTION, SOLUTION INTRAVENOUS; SUBCUTANEOUS at 17:57

## 2021-10-27 RX ADMIN — ROPINIROLE HYDROCHLORIDE 3 MG: 1 TABLET, FILM COATED ORAL at 09:40

## 2021-10-27 RX ADMIN — MORPHINE SULFATE 1 MG: 2 INJECTION, SOLUTION INTRAMUSCULAR; INTRAVENOUS at 17:58

## 2021-10-27 RX ADMIN — MORPHINE SULFATE 1 MG: 2 INJECTION, SOLUTION INTRAMUSCULAR; INTRAVENOUS at 23:04

## 2021-10-27 RX ADMIN — PANTOPRAZOLE SODIUM 40 MG: 40 TABLET, DELAYED RELEASE ORAL at 09:01

## 2021-10-27 RX ADMIN — INSULIN LISPRO 3 UNITS: 100 INJECTION, SOLUTION INTRAVENOUS; SUBCUTANEOUS at 11:36

## 2021-10-27 RX ADMIN — ROPINIROLE HYDROCHLORIDE 3 MG: 1 TABLET, FILM COATED ORAL at 23:19

## 2021-10-27 RX ADMIN — DILTIAZEM HYDROCHLORIDE 5 MG: 5 INJECTION INTRAVENOUS at 13:13

## 2021-10-27 RX ADMIN — FUROSEMIDE 40 MG: 10 INJECTION, SOLUTION INTRAMUSCULAR; INTRAVENOUS at 17:59

## 2021-10-27 RX ADMIN — APIXABAN 2.5 MG: 2.5 TABLET, FILM COATED ORAL at 23:04

## 2021-10-27 RX ADMIN — ARFORMOTEROL TARTRATE 15 MCG: 15 SOLUTION RESPIRATORY (INHALATION) at 19:37

## 2021-10-27 RX ADMIN — FUROSEMIDE 20 MG: 20 TABLET ORAL at 09:01

## 2021-10-27 RX ADMIN — ATENOLOL 25 MG: 25 TABLET ORAL at 09:01

## 2021-10-27 RX ADMIN — FUROSEMIDE 40 MG: 10 INJECTION, SOLUTION INTRAMUSCULAR; INTRAVENOUS at 13:06

## 2021-10-27 RX ADMIN — AZITHROMYCIN MONOHYDRATE 500 MG: 500 INJECTION, POWDER, LYOPHILIZED, FOR SOLUTION INTRAVENOUS at 16:00

## 2021-10-27 RX ADMIN — MONTELUKAST 10 MG: 10 TABLET, FILM COATED ORAL at 23:04

## 2021-10-27 RX ADMIN — SODIUM CHLORIDE 10 ML: 9 INJECTION, SOLUTION INTRAMUSCULAR; INTRAVENOUS; SUBCUTANEOUS at 23:19

## 2021-10-27 RX ADMIN — ONDANSETRON 4 MG: 2 INJECTION INTRAMUSCULAR; INTRAVENOUS at 17:59

## 2021-10-27 RX ADMIN — METHYLPREDNISOLONE SODIUM SUCCINATE 40 MG: 40 INJECTION, POWDER, FOR SOLUTION INTRAMUSCULAR; INTRAVENOUS at 14:42

## 2021-10-27 RX ADMIN — BUDESONIDE 250 MCG: 0.25 INHALANT RESPIRATORY (INHALATION) at 19:37

## 2021-10-27 RX ADMIN — ONDANSETRON 4 MG: 2 INJECTION INTRAMUSCULAR; INTRAVENOUS at 11:49

## 2021-10-27 RX ADMIN — METHYLPREDNISOLONE SODIUM SUCCINATE 40 MG: 40 INJECTION, POWDER, FOR SOLUTION INTRAMUSCULAR; INTRAVENOUS at 23:04

## 2021-10-27 RX ADMIN — SODIUM CHLORIDE 10 ML: 9 INJECTION, SOLUTION INTRAMUSCULAR; INTRAVENOUS; SUBCUTANEOUS at 13:16

## 2021-10-27 RX ADMIN — SODIUM CHLORIDE 10 ML: 9 INJECTION, SOLUTION INTRAMUSCULAR; INTRAVENOUS; SUBCUTANEOUS at 03:21

## 2021-10-27 NOTE — PROGRESS NOTES
1150: patient stated she is feeling worse than yesterday. Feels more SOB, sats stable on 2L. Crackles bilateral, unchanged from this morning. Now with new back pain to Left side and nausea. MD notified, waiting for call back.

## 2021-10-27 NOTE — PROGRESS NOTES
TRANSFER - OUT REPORT:    Verbal report given to JESUS Boswell(name) on [facility]  being transferred to U 2264(unit) for routine progression of care       Report consisted of patients Situation, Background, Assessment and   Recommendations(SBAR). Information from the following report(s) SBAR, Kardex, Intake/Output, MAR, Recent Results and Cardiac Rhythm Afib with frequent PVC's was reviewed with the receiving nurse. Lines:   Peripheral IV 10/26/21 Anterior;Left;Proximal Forearm (Active)   Site Assessment Clean, dry, & intact 10/27/21 0324   Phlebitis Assessment 0 10/27/21 0324   Infiltration Assessment 0 10/27/21 0324   Dressing Status Clean, dry, & intact 10/27/21 0324   Dressing Type Transparent 10/27/21 0324   Hub Color/Line Status Blue 10/27/21 0324   Alcohol Cap Used Yes 10/26/21 1514       Peripheral IV 10/27/21 Right Antecubital (Active)        Opportunity for questions and clarification was provided.       Patient transported with:   Tech to Ultrasound and then to PCU [phone]

## 2021-10-27 NOTE — PROGRESS NOTES
Addendum  created 04/12/19 1318 by Philippe Apley, CRNA [employer] [employer] edited Problem: Pressure Injury - Risk of  Goal: *Prevention of pressure injury  Description: Document Malik Scale and appropriate interventions in the flowsheet.   Outcome: Progressing Towards Goal  Note: Pressure Injury Interventions:  Sensory Interventions: Keep linens dry and wrinkle-free, Float heels    Moisture Interventions: Internal/External urinary devices    Activity Interventions: PT/OT evaluation    Mobility Interventions: PT/OT evaluation    Nutrition Interventions: Document food/fluid/supplement intake    Friction and Shear Interventions: Lift sheet

## 2021-10-27 NOTE — PROGRESS NOTES
Problem: Pressure Injury - Risk of  Goal: *Prevention of pressure injury  Description: Document Malik Scale and appropriate interventions in the flowsheet.   Outcome: Progressing Towards Goal  Note: Pressure Injury Interventions:  Sensory Interventions: Assess changes in LOC, Assess need for specialty bed, Avoid rigorous massage over bony prominences, Keep linens dry and wrinkle-free, Maintain/enhance activity level, Minimize linen layers    Moisture Interventions: Internal/External urinary devices, Maintain skin hydration (lotion/cream), Minimize layers    Activity Interventions: Assess need for specialty bed, Increase time out of bed, Pressure redistribution bed/mattress(bed type)    Mobility Interventions: HOB 30 degrees or less, Pressure redistribution bed/mattress (bed type), Float heels    Nutrition Interventions: Document food/fluid/supplement intake, Offer support with meals,snacks and hydration    Friction and Shear Interventions: HOB 30 degrees or less, Lift sheet, Minimize layers, Feet elevated on foot rest

## 2021-10-27 NOTE — PROGRESS NOTES
RAPID RESPONSE TEAM    Responded to overhead RRT to 2165    Staff report pt in afib RVR, occasionally tachy up to the 170's. Upon arrival, pt in NAD, mildly tachypneic, crackles heard in bases on auscultation. Pt recently switched from IV to PO lasix. CXR already pending from just prior to RRT. 's afib RVR confirmed with EKG. Pt hypertensive, O2 sats 94% on NC. Lab Results   Component Value Date/Time    Sodium 140 10/27/2021 04:34 AM    Potassium 4.4 10/27/2021 04:34 AM    Chloride 106 10/27/2021 04:34 AM    CO2 28 10/27/2021 04:34 AM    Anion gap 6 10/27/2021 04:34 AM    Glucose 135 (H) 10/27/2021 04:34 AM    BUN 50 (H) 10/27/2021 04:34 AM    Creatinine 1.51 (H) 10/27/2021 04:34 AM    BUN/Creatinine ratio 33 (H) 10/27/2021 04:34 AM    GFR est AA 39 (L) 10/27/2021 04:34 AM    GFR est non-AA 32 (L) 10/27/2021 04:34 AM    Calcium 8.7 10/27/2021 04:34 AM     Lab Results   Component Value Date/Time    WBC 11.1 (H) 10/27/2021 03:16 AM    HGB 11.4 (L) 10/27/2021 03:16 AM    HCT 34.8 (L) 10/27/2021 03:16 AM    PLATELET 086 (L) 85/51/5601 03:16 AM    MCV 96.7 10/27/2021 03:16 AM     Noted pt had increase in WBC, now tachycardic and tachypneic. Code sepsis initiated at 1250. Orders placed for CBC, BCx2, lactic acid. Dr Christina Krishna at bedside shortly after. Additional orders placed for procalcitonin, UA w/ reflex culture, US retroperitoneal, 40mg Lasix IV, zithromax, and 5mg dilt IV. HR improved to 90's after dilt. Other VSS. Pt will transfer to U 2268 from [facility] when clean. Patient Vitals for the past 4 hrs:   Temp Pulse Resp BP SpO2   10/27/21 1313  97      10/27/21 1248  (!) 111 24 (!) 186/100 94 %   10/27/21 1217  (!) 107 24 (!) 176/87 (!) 87 %   10/27/21 1141 98.2 °F (36.8 °C) 87 22 (!) 169/64 96 %         Please call with any questions or concerns.      So Ortiz

## 2021-10-27 NOTE — PROGRESS NOTES
Responded to Code Sepsis in Room 2165. Patients meets 2 of 3 components of severe sepsis/     Source: Possible Pneumonia  SIRS: , RR 24, WBC 18.4  Organ Dysfunction: None at this time    Repeat Lactate Due by: N/A (Lactic Acid resulted at 2.0)  No crystalloid fluid bolus required. Antibiotics initiated. Paired blood cultures drawn prior to the admin of antibiotics.

## 2021-10-27 NOTE — PROGRESS NOTES
Problem: Pressure Injury - Risk of  Goal: *Prevention of pressure injury  Description: Document Malik Scale and appropriate interventions in the flowsheet. 10/27/2021 0325 by Dilma Hurd RN  Outcome: Progressing Towards Goal  Note: Pressure Injury Interventions:  Sensory Interventions: Turn and reposition approx. every two hours (pillows and wedges if needed)    Moisture Interventions: Absorbent underpads, Check for incontinence Q2 hours and as needed, Maintain skin hydration (lotion/cream)    Activity Interventions: Pressure redistribution bed/mattress(bed type)    Mobility Interventions: PT/OT evaluation, Pressure redistribution bed/mattress (bed type), HOB 30 degrees or less    Nutrition Interventions: Document food/fluid/supplement intake    Friction and Shear Interventions: HOB 30 degrees or less             10/26/2021 1950 by Dilma Hurd RN  Outcome: Progressing Towards Goal  Note: Pressure Injury Interventions:  Sensory Interventions: Turn and reposition approx.  every two hours (pillows and wedges if needed)    Moisture Interventions: Absorbent underpads, Check for incontinence Q2 hours and as needed, Maintain skin hydration (lotion/cream)    Activity Interventions: Pressure redistribution bed/mattress(bed type)    Mobility Interventions: PT/OT evaluation, Pressure redistribution bed/mattress (bed type), HOB 30 degrees or less    Nutrition Interventions: Document food/fluid/supplement intake    Friction and Shear Interventions: HOB 30 degrees or less

## 2021-10-27 NOTE — PROGRESS NOTES
1500: TRANSFER - IN REPORT:    Verbal report received from [address], RN(name) on Caleb Gutierrez  being received from IVCU(unit) for routine progression of care      Report consisted of patients Situation, Background, Assessment and   Recommendations(SBAR). Information from the following report(s) SBAR, Kardex, Intake/Output, MAR and Recent Results was reviewed with the receiving nurse. Opportunity for questions and clarification was provided. Assessment completed upon patients arrival to unit and care assumed. 1521: Patient pulled into system on PCU, but off the floor for US.    1601: Patient arrived on unit, A&OX4. Slightly tachy, BP elevated, will monitor. 1641: Patient's , Dr. Junior Stewart messaged to see if he wants to add anything for HTN. Also, patient stating she would like something for sleep tonight as well. Will ask Dr. Junior Stewart. 1642: Dr. Junior Stewart responded and gave verbal orders for Lopressor 5mg Q6 PRN for SBP >160. Also, Melatonin 6mg QHSPRN. 3666: Primary Nurse Ariadna Hart and Angela Pineda RN performed a dual skin assessment on this patient Impairment noted- see wound doc flow sheet- nonblanchable redness on sacrum  Malik score is 15    1900: End of Shift Note    Bedside shift change report given to oncoming RN (oncoming nurse) by Ariadna Hart (offgoing nurse). Report included the following information SBAR, Kardex, Intake/Output, MAR and Recent Results    Shift worked:  [id number]     Shift summary and any significant changes:     TX from [facility] 39, 2L NC, complaints of nausea and back pain, HTN- meds added     Concerns for physician to address:  none     Zone phone for oncoming shift:   XXX       Activity:  Activity Level:  Up with Assistance  Number times ambulated in hallways past shift: 0  Number of times OOB to chair past shift: 0    Cardiac:   Cardiac Monitoring: Yes      Cardiac Rhythm: Atrial Fib, Run PVCs    Access:   Current line(s): PIV     Genitourinary: Urinary status: external catheter    Respiratory:   O2 Device: Nasal cannula  Chronic home O2 use?: NO  Incentive spirometer at bedside: N/A     GI:  Last Bowel Movement Date: 10/26/21  Current diet:  ADULT DIET Regular; 3 carb choices (45 gm/meal); No Salt Added (3-4 gm)  Passing flatus: YES  Tolerating current diet: YES       Pain Management:   Patient states pain is manageable on current regimen: YES    Skin:  Malik Score: 15  Interventions: turn team, speciality bed, float heels, increase time out of bed and PT/OT consult    Patient Safety:  Fall Score:  Total Score: 3  Interventions: bed/chair alarm, assistive device (walker, cane, etc), gripper socks, pt to call before getting OOB and sitter at bedside   High Fall Risk: Yes    Length of Stay:  Expected LOS: 4d 19h  Actual LOS: [address] [facility]

## 2021-10-27 NOTE — PROGRESS NOTES
Spiritual Care Assessment/Progress Note  [facility]      NAME: Griselda Flores      MRN: [id number]  AGE: 80 y.o. SEX: female  Confucianist Affiliation: [employer]ar[employer]   Language: English     10/27/2021     Total Time (in minutes): 8     Spiritual Assessment begun in [facility] through conversation with:         []Patient        [] Family    [] Friend(s)        Reason for Consult: Rapid response team     Spiritual beliefs: (Please include comment if needed)     [] Identifies with a raf tradition:         [] Supported by a raf community:            [] Claims no spiritual orientation:           [] Seeking spiritual identity:                [] Adheres to an individual form of spirituality:           [x] Not able to assess:                           Identified resources for coping:      [] Prayer                               [] Music                  [] Guided Imagery     [] Family/friends                 [] Pet visits     [] Devotional reading                         [x] Unknown     [] Other:                                              Interventions offered during this visit: (See comments for more details)    Patient Interventions: Crisis, Initial visit           Plan of Care:     [x] Support spiritual and/or cultural needs    [] Support AMD and/or advance care planning process      [] Support grieving process   [] Coordinate Rites and/or Rituals    [] Coordination with community clergy   [] No spiritual needs identified at this time   [] Detailed Plan of Care below (See Comments)  [] Make referral to Music Therapy  [] Make referral to Pet Therapy     [] Make referral to Addiction services  [] Make referral to [facility]  [] Make referral to Spiritual Care Partner  [] No future visits requested        [x] Follow up upon further referrals     Comments:  Responded to RRT on IVCU. No family/friends present. Patient was being evaluated by medical staff.  Unable to assess for spiritual needs or concerns at this time.    available upon referral by nurse or by patient request.       VITO Arrieta, [facility], Staff [address]    [facility] Paging Service  [phone])

## 2021-10-27 NOTE — PROGRESS NOTES
10/27/21 1601   Vitals   Temp 98.2 °F (36.8 °C)   Temp Source Oral   Pulse (Heart Rate) 95   Heart Rate Source Monitor   Heart Rate (Monitor) 95   Resp Rate 30   O2 Sat (%) 91 %   Level of Consciousness Alert (0)   BP (!) 178/85   MAP (Monitor) 109   MAP (Calculated) 116   Cardiac Rhythm Atrial Fib;Run PVCs   MEWS Score 3   Alarms Set and Audible Cardiac alarms;Pulse ox alarms; Respiratory alarms   Box Number HW   Electrodes Replaced No   Pain 1   Pain Scale 1 Numeric (0 - 10)   Pain Intensity 1 4   Patient Stated Pain Goal 0   Pain Reassessment 1 Yes   Pain Onset 1 acute   Pain Location 1 Back   Pain Orientation 1 Lower; Left   Pain Description 1 Aching   Pain Intervention(s) 1 Repositioned   patient arrived on unit, in bed resting

## 2021-10-27 NOTE — PROGRESS NOTES
Hospitalist Progress Note    NAME: Lizette Mcgill   :  3/7/1927   MRN:  [id number]       Assessment / Plan:  Acute hypoxic respiratory failure, POA  Acute COPD exacerbation, POA  Acute heart failure, POA  Cardiogenic pulmonary edema, POA  Sepsis with tachycardia and leukocytosis  Was admitted initially to ICU on October transfer and transferred to floors on   I assumed care today  She was given 1 dose of azithromycin in the ED  Continue aspirin  Start Lasix 40 mg IV twice daily  Obtain echo  Daily weightsCASPER's  We will start steroids for COPD exacerbation  Chest x-ray with pulmonary edema and small pleural effusion  Start nebulizers  No symptoms of UTI  Obtain UA, procalcitonin, lactic acid    A. fib with RVR  History of paroxysmal A. fib  Continue home Eliquis  On atenolol however she was given 1 dose of diltiazem 5 mg IV due to RVR  Her RVR is likely secondary to respiratory distress    CKD stage III  Creatinine on 1.56 on admission, 1.5 point this morning  IV cardiorenal syndrome  Obtain ultrasound kidneys bilaterally to rule out hydronephrosis given left leg pain  Nephrotoxic agents if possible,continue IV diuresis    I provided 35 minutes of critical care time. During this entire length of time I was immediately available to the patient. The reason for providing this level of medical care was due to a critical illness that impaired one or more vital organ systems, such that there was a high probability of imminent or life threatening deterioration in the patient's condition. This care involved high complexity decision making which includes reviewing the patient's past medical records, current laboratory results, and actual Xray films in order to assess, support vital system function, and to treat this degree of vital organ system failure, and to prevent further life threatening deterioration of the patients condition.           25.0 - 29.9 Overweight / Body mass index is 29.2 kg/m². Estimated discharge date: October 30  Barriers: Clinical improvement    Code status: DNR  Prophylaxis: Eliquis  Recommended Disposition: Home w/Family     Subjective:     Patient was seen and examined. No acute events overnight. Discussed with RN overnight events. All patient's questions were answered. \"doing ok\"    Review of Systems:  Symptom Y/N Comments  Symptom Y/N Comments   Fever/Chills n   Chest Pain n    Poor Appetite    Edema y    Cough n   Abdominal Pain n    Sputum    Joint Pain     SOB/MATTA y   Pruritis/Rash     Nausea/vomit y   Tolerating PT/OT     Diarrhea    Tolerating Diet y    Constipation    Other       Could NOT obtain due to:          Objective:     VITALS:   Last 24hrs VS reviewed since prior progress note.  Most recent are:  Patient Vitals for the past 24 hrs:   Temp Pulse Resp BP SpO2   10/27/21 1313  97      10/27/21 1248  (!) 111 24 (!) 186/100 94 %   10/27/21 1217  (!) 107 24 (!) 176/87 (!) 87 %   10/27/21 1141 98.2 °F (36.8 °C) 87 22 (!) 169/64 96 %   10/27/21 0906    139/72 96 %   10/27/21 0808     94 %   10/27/21 0804     (!) 84 %   10/27/21 0729 98 °F (36.7 °C) 67 18 (!) 134/50 99 %   10/27/21 0317 97.6 °F (36.4 °C) 69 20 (!) 139/54 97 %   10/26/21 2300  74 23 (!) 135/38 97 %   10/26/21 1900 97.2 °F (36.2 °C) 80 25 (!) 136/55 97 %   10/26/21 1800  68 20 (!) 128/52 91 %   10/26/21 1730  65 21  94 %   10/26/21 1700  68 22 (!) 106/44 95 %   10/26/21 1630  72 21  98 %   10/26/21 1600  71 21 (!) 111/48 98 %   10/26/21 1530  72 18  98 %   10/26/21 1500  76 24 (!) 120/41 97 %   10/26/21 1430  81 22  97 %   10/26/21 1400  86 23 125/60 95 %       Intake/Output Summary (Last 24 hours) at 10/27/2021 1355  Last data filed at 10/27/2021 1131  Gross per 24 hour   Intake 180 ml   Output 1550 ml   Net -1370 ml        I had a face to face encounter and independently examined this patient on 10/27/2021, as outlined below:  PHYSICAL EXAM:  General: WD, WN. Alert, cooperative, no acute distress    EENT:  EOMI. Anicteric sclerae. MMM  Resp:  CTA bilaterally, no wheezing but bilateral rales. No accessory muscle use  CV:  Regular  rhythm,  No edema  GI:  Soft, Non distended, Non tender. +Bowel sounds  Neurologic:  Alert and oriented X 3, normal speech,   Psych:   Good insight. Not anxious nor agitated  Skin:  No rashes. No jaundice    Reviewed most current lab test results and cultures  YES  Reviewed most current radiology test results   YES  Review and summation of old records today    NO  Reviewed patient's current orders and MAR    YES  PMH/SH reviewed - no change compared to H&P  ________________________________________________________________________  Care Plan discussed with:    Comments   Patient x    Family      RN x    Care Manager     Consultant                        Multidiciplinary team rounds were held today with , nursing, pharmacist and clinical coordinator. Patient's plan of care was discussed; medications were reviewed and discharge planning was addressed. ________________________________________________________________________  Total NON critical care TIME:  30 Minutes    Total CRITICAL CARE TIME Spent: 35  Minutes non procedure based      Comments   >50% of visit spent in counseling and coordination of care     ________________________________________________________________________  Diego Lr MD     Procedures: see electronic medical records for all procedures/Xrays and details which were not copied into this note but were reviewed prior to creation of Plan. LABS:  I reviewed today's most current labs and imaging studies.   Pertinent labs include:  Recent Labs     10/27/21  1305 10/27/21  0316 10/26/21  0136   WBC 18.4* 11.1* 7.5   HGB 14.6 11.4* 11.5   HCT 44.5 34.8* 34.6*    111* 101*     Recent Labs     10/27/21  0434 10/26/21  0136    139   K 4.4 4.6    106   CO2 28 28   GLU 135* 155*   BUN 50* 44*   CREA 1.51* 1.56*   CA 8.7 8.8   MG 2.4 2.4   PHOS  --  4.9*   ALB  --  2.9*   TBILI  --  0.6   ALT  --  117*   INR  --  1.1       Signed: Beth Rodriguez MD

## 2021-10-27 NOTE — PROGRESS NOTES
Received notification from bedside RN about patient with regards to: Requesting medication for sleep, using Ambien PTA  VS: /55, HR 80, RR 25, O2 sat 97% on NC 2 l    Intervention given: Tylenol PM x 1 dose ordered

## 2021-10-27 NOTE — PROGRESS NOTES
1915: Bedside shift change report given to Meño Rosario Rn  (oncoming nurse) by Osbaldo Matute RN  (offgoing nurse). Report included the following information SBAR.

## 2021-10-28 ENCOUNTER — APPOINTMENT (OUTPATIENT)
Dept: ULTRASOUND IMAGING | Age: 86
DRG: 291 | End: 2021-10-28
Attending: STUDENT IN AN ORGANIZED HEALTH CARE EDUCATION/TRAINING PROGRAM
Payer: MEDICARE

## 2021-10-28 ENCOUNTER — APPOINTMENT (OUTPATIENT)
Dept: NON INVASIVE DIAGNOSTICS | Age: 86
DRG: 291 | End: 2021-10-28
Attending: INTERNAL MEDICINE
Payer: MEDICARE

## 2021-10-28 LAB
ALBUMIN SERPL-MCNC: 3.1 G/DL (ref 3.5–5)
ALBUMIN/GLOB SERPL: 0.9 {RATIO} (ref 1.1–2.2)
ALP SERPL-CCNC: 65 U/L (ref 45–117)
ALT SERPL-CCNC: 93 U/L (ref 12–78)
ANION GAP SERPL CALC-SCNC: 8 MMOL/L (ref 5–15)
AST SERPL-CCNC: 15 U/L (ref 15–37)
AV R PG: 67.88 MMHG
BILIRUB SERPL-MCNC: 0.8 MG/DL (ref 0.2–1)
BUN SERPL-MCNC: 65 MG/DL (ref 6–20)
BUN/CREAT SERPL: 27 (ref 12–20)
CALCIUM SERPL-MCNC: 9.1 MG/DL (ref 8.5–10.1)
CHLORIDE SERPL-SCNC: 101 MMOL/L (ref 97–108)
CO2 SERPL-SCNC: 25 MMOL/L (ref 21–32)
CREAT SERPL-MCNC: 2.43 MG/DL (ref 0.55–1.02)
ECHO AR MAX VEL PISA: 411.94 CM/S
ECHO AV MEAN GRADIENT: 24.01 MMHG
ECHO AV PEAK GRADIENT: 39.67 MMHG
ECHO AV PEAK VELOCITY: 314.9 CM/S
ECHO AV REGURGITANT PHT: 489.85 MS
ECHO AV VTI: 71.3 CM
ECHO LV E' LATERAL VELOCITY: 8.59 CM/S
ECHO LV E' SEPTAL VELOCITY: 5.43 CM/S
ECHO LVOT PEAK GRADIENT: 4.06 MMHG
ECHO LVOT PEAK VELOCITY: 100.7 CM/S
ECHO LVOT VTI: 22.91 CM
ECHO MV A VELOCITY: 28.14 CM/S
ECHO MV AREA PHT: 3.95 CM2
ECHO MV E DECELERATION TIME (DT): 167.1 MS
ECHO MV E VELOCITY: 128.46 CM/S
ECHO MV E/A RATIO: 4.57
ECHO MV E/E' LATERAL: 14.95
ECHO MV E/E' RATIO (AVERAGED): 19.31
ECHO MV E/E' SEPTAL: 23.66
ECHO MV MAX VELOCITY: 167.61 CM/S
ECHO MV MEAN GRADIENT: 3.71 MMHG
ECHO MV PEAK GRADIENT: 11.24 MMHG
ECHO MV PRESSURE HALF TIME (PHT): 55.66 MS
ECHO MV REGURGITANT VTIA: 219.94 CM
ECHO MV VTI: 30.22 CM
ECHO PV MAX VELOCITY: 132.02 CM/S
ECHO PV PEAK INSTANTANEOUS GRADIENT SYSTOLIC: 6.97 MMHG
ECHO TV MEAN GRADIENT: 29.23 MMHG
ECHO TV MEAN GRADIENT: 89.18 MMHG
ECHO TV REGURGITANT MAX VELOCITY: 331.66 CM/S
ECHO TV REGURGITANT MAX VELOCITY: 583.18 CM/S
ECHO TV REGURGITANT PEAK GRADIENT: 44 MMHG
ECHO TV REGURGITANT VTI: 117.89 CM
ERYTHROCYTE [DISTWIDTH] IN BLOOD BY AUTOMATED COUNT: 12.7 % (ref 11.5–14.5)
GLOBULIN SER CALC-MCNC: 3.5 G/DL (ref 2–4)
GLUCOSE BLD STRIP.AUTO-MCNC: 174 MG/DL (ref 65–117)
GLUCOSE BLD STRIP.AUTO-MCNC: 176 MG/DL (ref 65–117)
GLUCOSE BLD STRIP.AUTO-MCNC: 194 MG/DL (ref 65–117)
GLUCOSE BLD STRIP.AUTO-MCNC: 204 MG/DL (ref 65–117)
GLUCOSE SERPL-MCNC: 220 MG/DL (ref 65–100)
HCT VFR BLD AUTO: 41.5 % (ref 35–47)
HGB BLD-MCNC: 13.5 G/DL (ref 11.5–16)
LVOT MG: 2.54 MMHG
MCH RBC QN AUTO: 31.4 PG (ref 26–34)
MCHC RBC AUTO-ENTMCNC: 32.5 G/DL (ref 30–36.5)
MCV RBC AUTO: 96.5 FL (ref 80–99)
NRBC # BLD: 0 K/UL (ref 0–0.01)
NRBC BLD-RTO: 0 PER 100 WBC
PLATELET # BLD AUTO: 106 K/UL (ref 150–400)
PMV BLD AUTO: 11.9 FL (ref 8.9–12.9)
POTASSIUM SERPL-SCNC: 4.9 MMOL/L (ref 3.5–5.1)
PROT SERPL-MCNC: 6.6 G/DL (ref 6.4–8.2)
RBC # BLD AUTO: 4.3 M/UL (ref 3.8–5.2)
SERVICE CMNT-IMP: ABNORMAL
SODIUM SERPL-SCNC: 134 MMOL/L (ref 136–145)
WBC # BLD AUTO: 14.6 K/UL (ref 3.6–11)

## 2021-10-28 PROCEDURE — 2709999900 HC NON-CHARGEABLE SUPPLY

## 2021-10-28 PROCEDURE — 74011636637 HC RX REV CODE- 636/637: Performed by: NURSE PRACTITIONER

## 2021-10-28 PROCEDURE — 74011250636 HC RX REV CODE- 250/636: Performed by: INTERNAL MEDICINE

## 2021-10-28 PROCEDURE — 65660000000 HC RM CCU STEPDOWN

## 2021-10-28 PROCEDURE — 36415 COLL VENOUS BLD VENIPUNCTURE: CPT

## 2021-10-28 PROCEDURE — 93306 TTE W/DOPPLER COMPLETE: CPT

## 2021-10-28 PROCEDURE — 77010033678 HC OXYGEN DAILY

## 2021-10-28 PROCEDURE — 74011250637 HC RX REV CODE- 250/637: Performed by: NURSE PRACTITIONER

## 2021-10-28 PROCEDURE — 82962 GLUCOSE BLOOD TEST: CPT

## 2021-10-28 PROCEDURE — 74011000250 HC RX REV CODE- 250: Performed by: NURSE PRACTITIONER

## 2021-10-28 PROCEDURE — 74011000250 HC RX REV CODE- 250: Performed by: INTERNAL MEDICINE

## 2021-10-28 PROCEDURE — 80053 COMPREHEN METABOLIC PANEL: CPT

## 2021-10-28 PROCEDURE — 93970 EXTREMITY STUDY: CPT

## 2021-10-28 PROCEDURE — 85027 COMPLETE CBC AUTOMATED: CPT

## 2021-10-28 PROCEDURE — 94640 AIRWAY INHALATION TREATMENT: CPT

## 2021-10-28 PROCEDURE — 74011250637 HC RX REV CODE- 250/637: Performed by: INTERNAL MEDICINE

## 2021-10-28 RX ADMIN — MORPHINE SULFATE 1 MG: 2 INJECTION, SOLUTION INTRAMUSCULAR; INTRAVENOUS at 04:07

## 2021-10-28 RX ADMIN — METHYLPREDNISOLONE SODIUM SUCCINATE 40 MG: 40 INJECTION, POWDER, FOR SOLUTION INTRAMUSCULAR; INTRAVENOUS at 08:51

## 2021-10-28 RX ADMIN — AZITHROMYCIN MONOHYDRATE 500 MG: 500 INJECTION, POWDER, LYOPHILIZED, FOR SOLUTION INTRAVENOUS at 14:32

## 2021-10-28 RX ADMIN — SODIUM CHLORIDE 10 ML: 9 INJECTION, SOLUTION INTRAMUSCULAR; INTRAVENOUS; SUBCUTANEOUS at 14:33

## 2021-10-28 RX ADMIN — SODIUM CHLORIDE 10 ML: 9 INJECTION, SOLUTION INTRAMUSCULAR; INTRAVENOUS; SUBCUTANEOUS at 22:47

## 2021-10-28 RX ADMIN — FUROSEMIDE 40 MG: 10 INJECTION, SOLUTION INTRAMUSCULAR; INTRAVENOUS at 08:51

## 2021-10-28 RX ADMIN — ARFORMOTEROL TARTRATE 15 MCG: 15 SOLUTION RESPIRATORY (INHALATION) at 19:35

## 2021-10-28 RX ADMIN — ROPINIROLE HYDROCHLORIDE 3 MG: 1 TABLET, FILM COATED ORAL at 08:51

## 2021-10-28 RX ADMIN — MONTELUKAST 10 MG: 10 TABLET, FILM COATED ORAL at 22:42

## 2021-10-28 RX ADMIN — INSULIN LISPRO 3 UNITS: 100 INJECTION, SOLUTION INTRAVENOUS; SUBCUTANEOUS at 08:50

## 2021-10-28 RX ADMIN — PANTOPRAZOLE SODIUM 40 MG: 40 TABLET, DELAYED RELEASE ORAL at 08:51

## 2021-10-28 RX ADMIN — INSULIN LISPRO 176 UNITS: 100 INJECTION, SOLUTION INTRAVENOUS; SUBCUTANEOUS at 12:05

## 2021-10-28 RX ADMIN — ATENOLOL 25 MG: 25 TABLET ORAL at 08:51

## 2021-10-28 RX ADMIN — ROPINIROLE HYDROCHLORIDE 3 MG: 1 TABLET, FILM COATED ORAL at 17:16

## 2021-10-28 RX ADMIN — BUDESONIDE 250 MCG: 0.25 INHALANT RESPIRATORY (INHALATION) at 19:35

## 2021-10-28 RX ADMIN — ARFORMOTEROL TARTRATE 15 MCG: 15 SOLUTION RESPIRATORY (INHALATION) at 08:47

## 2021-10-28 RX ADMIN — METOPROLOL TARTRATE 5 MG: 5 INJECTION INTRAVENOUS at 04:07

## 2021-10-28 RX ADMIN — BUDESONIDE 250 MCG: 0.25 INHALANT RESPIRATORY (INHALATION) at 08:47

## 2021-10-28 RX ADMIN — FUROSEMIDE 40 MG: 10 INJECTION, SOLUTION INTRAMUSCULAR; INTRAVENOUS at 17:12

## 2021-10-28 RX ADMIN — ROPINIROLE HYDROCHLORIDE 3 MG: 1 TABLET, FILM COATED ORAL at 23:24

## 2021-10-28 RX ADMIN — MELATONIN 6 MG: at 23:08

## 2021-10-28 RX ADMIN — APIXABAN 2.5 MG: 2.5 TABLET, FILM COATED ORAL at 08:52

## 2021-10-28 RX ADMIN — INSULIN LISPRO 4 UNITS: 100 INJECTION, SOLUTION INTRAVENOUS; SUBCUTANEOUS at 17:12

## 2021-10-28 NOTE — PROGRESS NOTES
0700- report received from [address] and agree with assessment completed by JESUS Zaidi    2100- reviewed and agree with assessment completed by Carmelita Anguiano    9739- report given to oncoming RN

## 2021-10-28 NOTE — PROGRESS NOTES
1900: Bedside shift change report given to [facility] and [address] [facility] Terrance RN (oncoming nurse) by Misty Stearns and Carlos Rangel RN (offgoing nurse). Report included the following information SBAR, Kardex, Intake/Output, MAR, Recent Results and Cardiac Rhythm afib. 2000: Florencio Gonzalez regarding pt's uncontrolled pain after morphine. Lidocaine patch ordered and heating pad ordered. Lidocaine patch placed on lower back and heating pad placed on lower back. 2300: PRN morphine given     0400: PRN morphine and PRN metoprolol given. 0700: End of Shift Note    Bedside shift change report given to Hoot.Me (oncoming nurse) by Karen Porter RN (offgoing nurse). Report included the following information SBAR, Kardex, Intake/Output, MAR, Recent Results and Alarm Parameters     Shift worked:  [id number]     Shift summary and any significant changes:     PRN morphine given 2x, lidocaine patch and heating paid ordered and applied. PRN metoprolol given 1x. Concerns for physician to address:  pain control     [facility]          Activity:  Activity Level: Up with Assistance  Number times ambulated in hallways past shift: 0  Number of times OOB to chair past shift: 0    Cardiac:   Cardiac Monitoring: Yes      Cardiac Rhythm: Atrial Fib, Run PVCs    Access:   Current line(s): PIV     Genitourinary:   Urinary status: voiding and external catheter    Respiratory:   O2 Device: Nasal cannula  Chronic home O2 use?: NO  Incentive spirometer at bedside: NO     GI:  Last Bowel Movement Date: 10/26/21  Current diet:  ADULT DIET Regular; 3 carb choices (45 gm/meal); No Salt Added (3-4 gm)  Passing flatus: YES  Tolerating current diet: YES       Pain Management:   Patient states pain is manageable on current regimen: NO    Skin:  Malik Score: 15  Interventions: increase time out of bed    Patient Safety:  Fall Score:  Total Score: 3  Interventions: gripper socks and pt to call before getting OOB  High Fall Risk: Yes    Length of Stay:  Expected LOS: 4d 19h  Actual LOS: 2      Florencio Gilford, RN

## 2021-10-28 NOTE — PROGRESS NOTES
RAPID RESPONSE TEAM- Follow Up     Rounded on patient due to recent rapid response for A fib RVR, increased RR, and code sepsis. HR currently 70s-80s. Transitioned from from po to IV lasix. XR Results (most recent):  Results from Hospital Encounter encounter on 10/26/21    XR CHEST PORT    Narrative  INDICATION: Pleural effusion    COMPARISON: 10/26/2021    FINDINGS: AP portable imaging of the chest performed at 12:35 PM demonstrates  unchanged marked cardiomegaly. The thoracic aorta remains tortuous and  atherosclerotic mild pulmonary edema, bibasilar atelectasis, and small bilateral  pleural effusions are not significantly changed. There is a probable hiatal  hernia, unchanged. Degenerative changes are present in the thoracic spine. Impression  Unchanged cardiomegaly with mild pulmonary edema, bibasilar  atelectasis, and small bilateral pleural effusions. On 2L NC. Blood cultures with no current growth. Spoke to primary SAEID [employer]; no acute concerns at this time. No RRT interventions indicated at this time. Please call with any questions or concerns.      Steven Sampson RN  Ext. [phone]

## 2021-10-28 NOTE — PROGRESS NOTES
Received notification from bedside RN about patient with regards to: persistent back pain despite Morphine administration, requesting additional medication for relief  VS: /85, HR 95, RR 30, O2 sat 91% on NC 2 L    Intervention given: Lidocaine patch daily, first dose now.  Heating pad to affected area  ordered

## 2021-10-28 NOTE — PROGRESS NOTES
Hospitalist Progress Note    NAME: Alexi Leahy   :  3/7/1927   MRN:  [id number]       Assessment / Plan:  Acute hypoxic respiratory failure, POA Likely secondary to   Acute heart failure, POA  Cardiogenic pulmonary edema, POA  SIRS (tachycardia and leukocytosis) with no obvious source of infection    Was admitted initially to ICU and transferred to floor same day  She still has bilateral crackles and LE edema  Will cw lasix iv 40 q12  F/u ECHO, elevated BNP, CXR show pulmonary edema and bilateral effusion  Daily weightsCASPER's  Taper steroids  C/w nebulizers  UA negative, CXR doesn't show consolidation  Procal .27, lactate normal  C/w emperic azithromycin for now     A. fib with RVR  History of paroxysmal A. fib  Continue home Eliquis  On atenolol   Controlled today     ROCÍO on CKD stage III  Creatinine worse to 2.4 today  Likely cardiorenal syndrome  C/w iv diuretics  USG kidney show: Moderate left renal pelvocalectasis, left flank pain was likely d/t this, better today, if continue to have this, will eval them  Nephrotoxic agents if possible,continue IV diuresis     25.0 - 29.9 Overweight / Body mass index is 29.2 kg/m².     Estimated discharge date:   Barriers: Clinical improvement     Code status: DNR  Prophylaxis: Eliquis  Recommended Disposition: Home w/Family     Subjective:     Chief Complaint / Reason for Physician Visit  Follow up for ARF,/Afib RVR  She feels much better today    Review of Systems:  Symptom Y/N Comments  Symptom Y/N Comments   Fever/Chills n   Chest Pain n    Poor Appetite n   Edema     Cough    Abdominal Pain     Sputum    Joint Pain     SOB/MATTA    Pruritis/Rash     Nausea/vomit    Tolerating PT/OT     Diarrhea    Tolerating Diet     Constipation    Other       Could NOT obtain due to:      Objective:     VITALS:   Last 24hrs VS reviewed since prior progress note.  Most recent are:  Patient Vitals for the past 24 hrs:   Temp Pulse Resp BP SpO2   10/28/21 1221    (!) 141/52    10/28/21 1208     96 %   10/28/21 1106 98.4 °F (36.9 °C) 68 23 (!) 141/52 96 %   10/28/21 0848     96 %   10/28/21 0800  79      10/28/21 0734 98.4 °F (36.9 °C) 79 16 (!) 140/59 97 %   10/28/21 0407  87      10/28/21 0338  81 22 (!) 166/53 96 %   10/27/21 2258  76 (!) 32 (!) 154/81 95 %   10/27/21 2000  87      10/27/21 1937 98.5 °F (36.9 °C) 87 (!) 31 (!) 156/61 92 %   10/27/21 1617     91 %   10/27/21 1601 98.2 °F (36.8 °C) 95 30 (!) 178/85 91 %   10/27/21 1600  (!) 107      10/27/21 1424 97.7 °F (36.5 °C) 94 24 (!) 181/75 92 %   10/27/21 1313  97      10/27/21 1248  (!) 111 24 (!) 186/100 94 %       Intake/Output Summary (Last 24 hours) at 10/28/2021 1241  Last data filed at 10/28/2021 1142  Gross per 24 hour   Intake 0 ml   Output 2250 ml   Net -2250 ml        I had a face to face encounter and independently examined this patient on 10/28/2021, as outlined below:  PHYSICAL EXAM:  General: WD, WN. Alert, cooperative, no acute distress    EENT:  EOMI. Anicteric sclerae. MMM  Resp:  B/l crackles  CV:  Regular  rhythm,  Edema b/l LE L>R  GI:  Soft, Non distended, Non tender. +Bowel sounds  Neurologic:  Alert and oriented X 3, normal speech,   Psych:   Good insight. Not anxious nor agitated  Skin:  No rashes. No jaundice    Reviewed most current lab test results and cultures  YES  Reviewed most current radiology test results   YES  Review and summation of old records today    NO  Reviewed patient's current orders and MAR    YES  PMH/SH reviewed - no change compared to H&P  ________________________________________________________________________  Care Plan discussed with:    Comments   Patient y    Family      RN y    Care Manager     Consultant                        Multidiciplinary team rounds were held today with , nursing, pharmacist and clinical coordinator.   Patient's plan of care was discussed; medications were reviewed and discharge planning was addressed. ________________________________________________________________________  Total NON critical care TIME: 35   Minutes    Total CRITICAL CARE TIME Spent:   Minutes non procedure based      Comments   >50% of visit spent in counseling and coordination of care     ________________________________________________________________________  Master Berger MD     Procedures: see electronic medical records for all procedures/Xrays and details which were not copied into this note but were reviewed prior to creation of Plan. LABS:  I reviewed today's most current labs and imaging studies.   Pertinent labs include:  Recent Labs     10/28/21  0904 10/27/21  1305 10/27/21  0316   WBC 14.6* 18.4* 11.1*   HGB 13.5 14.6 11.4*   HCT 41.5 44.5 34.8*   * 161 111*     Recent Labs     10/28/21  0904 10/27/21  0434 10/26/21  0136   * 140 139   K 4.9 4.4 4.6    106 106   CO2 25 28 28   * 135* 155*   BUN 65* 50* 44*   CREA 2.43* 1.51* 1.56*   CA 9.1 8.7 8.8   MG  --  2.4 2.4   PHOS  --   --  4.9*   ALB 3.1*  --  2.9*   TBILI 0.8  --  0.6   ALT 93*  --  117*   INR  --   --  1.1       Signed: Master Berger MD

## 2021-10-28 NOTE — WOUND CARE
Wound care Nurse Consult: consult placed by staff nurse for \" nonblanchable redness on sacrum\". Patient is a 79 y/o CF admitted 10/26 for acute respiratory failure with hypoxia/COPD exacerbation. No past medical history on file. Patient turned onto left side and found blanchable areas of erythema to sacral buttocks area. No open wounds/skin. Patient incontinent of urine. Recommend:    Skin Care & Pressure Prevention:  Minimize layers of linen/pads under patient to optimize support surface. Turn/reposition approximately every 2 hours and offload heels.   Manage incontinence / promote continence   Nourishing Skin Cream to dry skin, minimize use of briefs when able    Apply Hydraguard/Z-guard protective creams to clean dry skin    Alvin Huerta RN, [employer]

## 2021-10-29 ENCOUNTER — APPOINTMENT (OUTPATIENT)
Dept: CT IMAGING | Age: 86
DRG: 291 | End: 2021-10-29
Attending: NURSE PRACTITIONER
Payer: MEDICARE

## 2021-10-29 ENCOUNTER — APPOINTMENT (OUTPATIENT)
Dept: CT IMAGING | Age: 86
DRG: 291 | End: 2021-10-29
Attending: STUDENT IN AN ORGANIZED HEALTH CARE EDUCATION/TRAINING PROGRAM
Payer: MEDICARE

## 2021-10-29 LAB
ANION GAP SERPL CALC-SCNC: 8 MMOL/L (ref 5–15)
BASOPHILS # BLD: 0 K/UL (ref 0–0.1)
BASOPHILS NFR BLD: 0 % (ref 0–1)
BUN SERPL-MCNC: 73 MG/DL (ref 6–20)
BUN/CREAT SERPL: 33 (ref 12–20)
CALCIUM SERPL-MCNC: 9.2 MG/DL (ref 8.5–10.1)
CHLORIDE SERPL-SCNC: 97 MMOL/L (ref 97–108)
CO2 SERPL-SCNC: 28 MMOL/L (ref 21–32)
CREAT SERPL-MCNC: 2.21 MG/DL (ref 0.55–1.02)
DIFFERENTIAL METHOD BLD: ABNORMAL
EOSINOPHIL # BLD: 0 K/UL (ref 0–0.4)
EOSINOPHIL NFR BLD: 0 % (ref 0–7)
ERYTHROCYTE [DISTWIDTH] IN BLOOD BY AUTOMATED COUNT: 12.6 % (ref 11.5–14.5)
GLUCOSE BLD STRIP.AUTO-MCNC: 137 MG/DL (ref 65–117)
GLUCOSE BLD STRIP.AUTO-MCNC: 162 MG/DL (ref 65–117)
GLUCOSE BLD STRIP.AUTO-MCNC: 194 MG/DL (ref 65–117)
GLUCOSE BLD STRIP.AUTO-MCNC: 235 MG/DL (ref 65–117)
GLUCOSE BLD STRIP.AUTO-MCNC: 272 MG/DL (ref 65–117)
GLUCOSE SERPL-MCNC: 156 MG/DL (ref 65–100)
HCT VFR BLD AUTO: 38.8 % (ref 35–47)
HGB BLD-MCNC: 13 G/DL (ref 11.5–16)
IMM GRANULOCYTES # BLD AUTO: 0.2 K/UL (ref 0–0.04)
IMM GRANULOCYTES NFR BLD AUTO: 1 % (ref 0–0.5)
LYMPHOCYTES # BLD: 1 K/UL (ref 0.8–3.5)
LYMPHOCYTES NFR BLD: 6 % (ref 12–49)
MCH RBC QN AUTO: 31.6 PG (ref 26–34)
MCHC RBC AUTO-ENTMCNC: 33.5 G/DL (ref 30–36.5)
MCV RBC AUTO: 94.2 FL (ref 80–99)
MONOCYTES # BLD: 1.3 K/UL (ref 0–1)
MONOCYTES NFR BLD: 8 % (ref 5–13)
NEUTS SEG # BLD: 14.3 K/UL (ref 1.8–8)
NEUTS SEG NFR BLD: 85 % (ref 32–75)
NRBC # BLD: 0 K/UL (ref 0–0.01)
NRBC BLD-RTO: 0 PER 100 WBC
PLATELET # BLD AUTO: 104 K/UL (ref 150–400)
PMV BLD AUTO: 12.4 FL (ref 8.9–12.9)
POTASSIUM SERPL-SCNC: 4.5 MMOL/L (ref 3.5–5.1)
RBC # BLD AUTO: 4.12 M/UL (ref 3.8–5.2)
SERVICE CMNT-IMP: ABNORMAL
SODIUM SERPL-SCNC: 133 MMOL/L (ref 136–145)
WBC # BLD AUTO: 16.8 K/UL (ref 3.6–11)

## 2021-10-29 PROCEDURE — 74011250636 HC RX REV CODE- 250/636: Performed by: INTERNAL MEDICINE

## 2021-10-29 PROCEDURE — 74011000250 HC RX REV CODE- 250: Performed by: NURSE PRACTITIONER

## 2021-10-29 PROCEDURE — 85025 COMPLETE CBC W/AUTO DIFF WBC: CPT

## 2021-10-29 PROCEDURE — 74011250636 HC RX REV CODE- 250/636: Performed by: NURSE PRACTITIONER

## 2021-10-29 PROCEDURE — 74011250636 HC RX REV CODE- 250/636: Performed by: STUDENT IN AN ORGANIZED HEALTH CARE EDUCATION/TRAINING PROGRAM

## 2021-10-29 PROCEDURE — 77010033678 HC OXYGEN DAILY

## 2021-10-29 PROCEDURE — 74176 CT ABD & PELVIS W/O CONTRAST: CPT

## 2021-10-29 PROCEDURE — 74011250637 HC RX REV CODE- 250/637: Performed by: NURSE PRACTITIONER

## 2021-10-29 PROCEDURE — 80048 BASIC METABOLIC PNL TOTAL CA: CPT

## 2021-10-29 PROCEDURE — 51798 US URINE CAPACITY MEASURE: CPT

## 2021-10-29 PROCEDURE — 94640 AIRWAY INHALATION TREATMENT: CPT

## 2021-10-29 PROCEDURE — 82962 GLUCOSE BLOOD TEST: CPT

## 2021-10-29 PROCEDURE — 36415 COLL VENOUS BLD VENIPUNCTURE: CPT

## 2021-10-29 PROCEDURE — 74011250637 HC RX REV CODE- 250/637: Performed by: STUDENT IN AN ORGANIZED HEALTH CARE EDUCATION/TRAINING PROGRAM

## 2021-10-29 PROCEDURE — 74011000250 HC RX REV CODE- 250: Performed by: STUDENT IN AN ORGANIZED HEALTH CARE EDUCATION/TRAINING PROGRAM

## 2021-10-29 PROCEDURE — 74011636637 HC RX REV CODE- 636/637: Performed by: NURSE PRACTITIONER

## 2021-10-29 PROCEDURE — 74011000250 HC RX REV CODE- 250: Performed by: INTERNAL MEDICINE

## 2021-10-29 PROCEDURE — 65660000000 HC RM CCU STEPDOWN

## 2021-10-29 PROCEDURE — 72131 CT LUMBAR SPINE W/O DYE: CPT

## 2021-10-29 RX ORDER — OXYCODONE HYDROCHLORIDE 5 MG/1
5 TABLET ORAL
Status: DISCONTINUED | OUTPATIENT
Start: 2021-10-29 | End: 2021-11-06 | Stop reason: HOSPADM

## 2021-10-29 RX ORDER — METHOCARBAMOL 500 MG/1
750 TABLET, FILM COATED ORAL
Status: DISCONTINUED | OUTPATIENT
Start: 2021-10-29 | End: 2021-11-06 | Stop reason: HOSPADM

## 2021-10-29 RX ADMIN — FUROSEMIDE 40 MG: 10 INJECTION, SOLUTION INTRAMUSCULAR; INTRAVENOUS at 17:24

## 2021-10-29 RX ADMIN — MONTELUKAST 10 MG: 10 TABLET, FILM COATED ORAL at 21:36

## 2021-10-29 RX ADMIN — ROPINIROLE HYDROCHLORIDE 3 MG: 1 TABLET, FILM COATED ORAL at 17:24

## 2021-10-29 RX ADMIN — INSULIN LISPRO 3 UNITS: 100 INJECTION, SOLUTION INTRAVENOUS; SUBCUTANEOUS at 08:36

## 2021-10-29 RX ADMIN — ARFORMOTEROL TARTRATE 15 MCG: 15 SOLUTION RESPIRATORY (INHALATION) at 19:38

## 2021-10-29 RX ADMIN — INSULIN LISPRO 2 UNITS: 100 INJECTION, SOLUTION INTRAVENOUS; SUBCUTANEOUS at 21:36

## 2021-10-29 RX ADMIN — INSULIN LISPRO 3 UNITS: 100 INJECTION, SOLUTION INTRAVENOUS; SUBCUTANEOUS at 17:23

## 2021-10-29 RX ADMIN — SODIUM CHLORIDE 10 ML: 9 INJECTION, SOLUTION INTRAMUSCULAR; INTRAVENOUS; SUBCUTANEOUS at 21:36

## 2021-10-29 RX ADMIN — AZITHROMYCIN MONOHYDRATE 500 MG: 500 INJECTION, POWDER, LYOPHILIZED, FOR SOLUTION INTRAVENOUS at 15:29

## 2021-10-29 RX ADMIN — FUROSEMIDE 40 MG: 10 INJECTION, SOLUTION INTRAMUSCULAR; INTRAVENOUS at 08:35

## 2021-10-29 RX ADMIN — PROCHLORPERAZINE EDISYLATE 5 MG: 5 INJECTION INTRAMUSCULAR; INTRAVENOUS at 09:32

## 2021-10-29 RX ADMIN — MORPHINE SULFATE 1 MG: 2 INJECTION, SOLUTION INTRAMUSCULAR; INTRAVENOUS at 03:52

## 2021-10-29 RX ADMIN — ROPINIROLE HYDROCHLORIDE 3 MG: 1 TABLET, FILM COATED ORAL at 08:35

## 2021-10-29 RX ADMIN — METOPROLOL TARTRATE 5 MG: 5 INJECTION INTRAVENOUS at 09:34

## 2021-10-29 RX ADMIN — ATENOLOL 25 MG: 25 TABLET ORAL at 08:35

## 2021-10-29 RX ADMIN — ROPINIROLE HYDROCHLORIDE 3 MG: 1 TABLET, FILM COATED ORAL at 21:42

## 2021-10-29 RX ADMIN — PANTOPRAZOLE SODIUM 40 MG: 40 TABLET, DELAYED RELEASE ORAL at 08:36

## 2021-10-29 RX ADMIN — METHYLPREDNISOLONE SODIUM SUCCINATE 40 MG: 40 INJECTION, POWDER, FOR SOLUTION INTRAMUSCULAR; INTRAVENOUS at 08:35

## 2021-10-29 RX ADMIN — OXYCODONE 5 MG: 5 TABLET ORAL at 21:42

## 2021-10-29 RX ADMIN — SODIUM CHLORIDE 10 ML: 9 INJECTION, SOLUTION INTRAMUSCULAR; INTRAVENOUS; SUBCUTANEOUS at 15:29

## 2021-10-29 RX ADMIN — METHOCARBAMOL TABLETS 750 MG: 500 TABLET, COATED ORAL at 10:34

## 2021-10-29 RX ADMIN — ONDANSETRON 4 MG: 2 INJECTION INTRAMUSCULAR; INTRAVENOUS at 05:17

## 2021-10-29 RX ADMIN — BUDESONIDE 250 MCG: 0.25 INHALANT RESPIRATORY (INHALATION) at 19:39

## 2021-10-29 NOTE — PROGRESS NOTES
Transition of Care Plan:     RUR:  12%   Disposition: Home with Son who provides 24/7 supervision  Follow up appointments: PCP, Specialists  DME needed: Pt has a walker. Transportation at Discharge: Pt's sonwill transport. Keys or means to access home:      Son will provide. IM Medicare Letter: needed at d/c  Is patient a BCPI-A Bundle:    n/a                  If yes, was Bundle Letter given?:   n/a  Caregiver Contact:Micheal Cornell [phone]  Discharge Caregiver contacted prior to discharge? CM will contact prior to d/c.      CM reviewed pt's chart and pt is not medically stable for d/c. CM will continue to follow and assist with d/c planning. [place], [place].   Care Manager [facility]  [phone]

## 2021-10-29 NOTE — CONSULTS
Nephrology Consult Note     Aquilino Stiles     [facility]. [facility]              Phone - [phone]   Patient: Nani Dye   YOB: 1927    Date- 10/29/2021  MRN: [id number]             REASON FOR CONSULTATION: ROCÍO  CONSULTING PHYSICIAN:     ADMIT DATE:10/26/2021 PATIENT Albert Duong MD     IMPRESSION & PLAN:     ROCÍO DUE to post renal obtruction is solitary functioning left kidney +/- diuretics use   Left hydronephrosis   ckd Back pain   CHF   CAD 3b- bl cr 1.5   Hypertension   Sob- pulmonary edema   afib with RVR   Atrophic right kidney    PLAN-  · Continue lasix  · Check CT ABDO  · Urology consult  · Continue atenolol  · Wean solumedrol  · Follow bmp  · Avoid hypotension  · Avoid acei or arb  · Cardiology input noted     Active Problems:    Acute respiratory failure with hypoxia ([facility] [address] (10/26/2021)        [x] High complexity decision making was performed  [x] Patient is at high-risk of decompensation with multiple organ involvement    Subjective:   HPI: Nani Dye is a 80 y.o.  female. She was tx from [facility] with resp failure- sob vs copd exa. She has developed rocío. Her cr 2.21 today   Her cr on 10-26-21---1.56  She didn't get any iv dye  No documented hypotension  She had no vomiting or diarrhea prior to admission  She is on diovan at home  She has been on abx since admission  He is having back pain. Her renal usg showed moderate left renal pelvocaliectasis--right renal atrophy  Her back CT scan showed left hydro  She has severe vision loss. She can see bright colors -she is not able to read anything. History per care taker. Review of Systems:    Can't access due to patient's current condition       Past Medical History:   Diagnosis Date    CAD (coronary artery disease)     CKD (chronic kidney disease)     Hypertension       No past surgical history on file.    Prior to Admission medications    Medication Sig Start Date End Date Taking? Authorizing Provider   albuterol (PROVENTIL VENTOLIN) 2.5 mg /3 mL (0.083 %) nebu INHALE 3ML BY NEBULIZATION 4 TIMES A DAY AS NEEDED 8/31/21  Yes Provider, Historical   amLODIPine (NORVASC) 5 mg tablet Take 5 mg by mouth daily. 3/3/21  Yes Provider, Historical   atenoloL (TENORMIN) 25 mg tablet Take 25 mg by mouth daily. 10/1/21  Yes Provider, Historical   diclofenac (VOLTAREN) 1 % gel Apply  to affected area. 5/5/21  Yes Provider, Historical   escitalopram oxalate (LEXAPRO) 10 mg tablet Take 10 mg by mouth daily. 9/28/21 3/27/22 Yes Provider, Historical   albuterol-ipratropium (DUO-NEB) 2.5 mg-0.5 mg/3 ml nebu Take 3 mL by inhalation. 6/14/21 6/14/22 Yes Provider, Historical   ketoconazole (NIZORAL) 2 % shampoo Shampoo with weekly 1-2 times. 7/29/21  Yes Provider, Historical   montelukast (SINGULAIR) 10 mg tablet Take 10 mg by mouth At bedtime. 1/7/21  Yes Provider, Historical   pantoprazole (PROTONIX) 40 mg tablet Take 40 mg by mouth daily. 10/18/21  Yes Provider, Historical   polyethylene glycol (MIRALAX) 17 gram/dose powder 1 scoop po daily in a drink prn constipation. 7/29/21  Yes Provider, Historical   rOPINIRole (REQUIP) 3 mg tab tab TAKE 1 TABLET FOUR TIMES A DAY 12/29/20  Yes Provider, Historical   valsartan (DIOVAN) 320 mg tablet Take 320 mg by mouth daily. 2/15/21  Yes Provider, Historical   zolpidem (AMBIEN) 10 mg tablet 1/2 to 1 po qhs prn insomnia. 9/17/21  Yes Provider, Historical   apixaban (ELIQUIS) 5 mg tablet Take 5 mg by mouth two (2) times a day. Yes Provider, Historical   fluticasone furoate-vilanteroL (Breo Ellipta) 100-25 mcg/dose inhaler Take 1 Puff by inhalation daily. Yes Provider, Historical   acetaminophen (TYLENOL) 325 mg tablet Take (1/2) tablet in the morning and (1/2) tablet at nights   Yes Provider, Historical   cyanocobalamin, vitamin B-12, (VITAMIN B12 PO) Take  by mouth.    Yes Provider, Historical   ondansetron hcl (ZOFRAN) 4 mg tablet TAKE 1 TABLET(4 MG) BY MOUTH EVERY 8 HOURS FOR UP TO 10 DAYS AS NEEDED FOR NAUSEA OR VOMITING 7/22/21   Provider, Historical     Allergies   Allergen Reactions    Pcn [Penicillins] Not Reported This Time     Tolerated ceftriaxone 10/2021    Sulfa (Sulfonamide Antibiotics) Not Reported This Time      Social History     Tobacco Use    Smoking status: Not on file   Substance Use Topics    Alcohol use: Not on file      No family history on file. Objective:      Patient Vitals for the past 24 hrs:   Temp Pulse Resp BP SpO2   10/29/21 1132 98.4 °F (36.9 °C) 87 19 (!) 153/72 95 %   10/29/21 1114  84 18 (!) 153/72 97 %   10/29/21 0815     96 %   10/29/21 0800     (!) 86 %   10/29/21 0745 98.1 °F (36.7 °C) 81 19 (!) 164/66 96 %   10/29/21 0346 97.5 °F (36.4 °C) 72 20 (!) 144/49 96 %   10/28/21 2332 97.5 °F (36.4 °C) 76 18 (!) 127/57 98 %   10/28/21 1959 97.6 °F (36.4 °C) 71 20 (!) 123/59 98 %   10/28/21 1600  82      10/28/21 1441 98.2 °F (36.8 °C) 82 21 (!) 143/63 95 %     No intake/output data recorded.   Last 3 Recorded Weights in this Encounter    10/27/21 0324 10/28/21 0654 10/28/21 1221   Weight: 77.2 kg (170 lb 3.2 oz) 76.8 kg (169 lb 5 oz) 76.7 kg (169 lb)      Physical Exam:  General:Alert, No distress,   Eyes:No scleral icterus, No conjunctival pallor  Neck:Supple,no mass palpable,no thyromegaly  Lungs:coarse to auscultation Bilaterally, increased respiratory effort  CVS:RRR, S1 S2 normal,  No rub,  Abdomen:Soft, Non tender, No hepatosplenomegaly  Extremities: + LE edema  Skin:No rash or lesions, Warm and DRY   Psych: Can't access due to patient's current condition   Musculoskeletal : no redness, no joint tenderness  NEURO: non focal  CODE STATUS:  DNR  Care Plan discussed with:       Chart reviewed.    y Reviewed previous records   y Discussion with patient and/or family and questions answered       ECG[de-identified] Rev:yes  Xray/CT/US/MRI REV:yes  Renal usg  TECHNIQUE:  Real-time sonography of the kidneys, retroperitoneum and bladder was performed  with multiple static images obtained.     FINDINGS:  Renal echotexture is normal. The right kidney is small with diffuse renal  cortical thinning. The left kidney shows moderate renal pelvocaliectasis. The  right kidney measures 7.1 cm and the left kidney measures 11.5 cm in length.     The aorta and the proximal iliac arteries are obscured by bowel gas and  therefore not assessed to advantage. The IVC is normal. No retroperitoneal mass  is identified.     The urinary bladder shows normal contour. No intraluminal mass is shown.     IMPRESSION  1. Moderate left renal pelvocaliectasis.   2. Atrophic right kidney.     Lab Data Personally Reviewed: (see below)  Recent Labs     10/29/21  0514 10/28/21  0904 10/27/21  1305 10/27/21  0434 10/27/21  0316   WBC 16.8* 14.6* 18.4*  --  11.1*   HGB 13.0 13.5 14.6  --  11.4*   * 106* 161  --  111*   ANEU 14.3*  --  14.3*  --  9.2*   * 134*  --  140  --    K 4.5 4.9  --  4.4  --    * 220*  --  135*  --    BUN 73* 65*  --  50*  --    CREA 2.21* 2.43*  --  1.51*  --    ALT  --  93*  --   --   --    TBILI  --  0.8  --   --   --    AP  --  65  --   --   --    CA 9.2 9.1  --  8.7  --    MG  --   --   --  2.4  --      Lab Results   Component Value Date/Time    Color YELLOW/STRAW 10/27/2021 02:18 PM    Appearance CLEAR 10/27/2021 02:18 PM    Specific gravity 1.008 10/27/2021 02:18 PM    pH (UA) 5.0 10/27/2021 02:18 PM    Protein Negative 10/27/2021 02:18 PM    Glucose Negative 10/27/2021 02:18 PM    Ketone Negative 10/27/2021 02:18 PM    Bilirubin Negative 10/27/2021 02:18 PM    Urobilinogen 0.2 10/27/2021 02:18 PM    Nitrites Negative 10/27/2021 02:18 PM    Leukocyte Esterase Negative 10/27/2021 02:18 PM    Epithelial cells FEW 10/27/2021 02:18 PM    Bacteria Negative 10/27/2021 02:18 PM    WBC 0-4 10/27/2021 02:18 PM    RBC 0-5 10/27/2021 02:18 PM       No results found for: IRON, FE, TIBC, IBCT, PSAT, FERR  Lab Results Component Value Date/Time    Culture result: NO GROWTH 2 DAYS 10/27/2021 01:04 PM    Culture result: NO GROWTH 2 DAYS 10/27/2021 01:04 PM     Prior to Admission Medications   Prescriptions Last Dose Informant Patient Reported? Taking?   acetaminophen (TYLENOL) 325 mg tablet 10/25/2021 at Unknown time Self Yes Yes   Sig: Take (1/2) tablet in the morning and (1/2) tablet at nights   albuterol (PROVENTIL VENTOLIN) 2.5 mg /3 mL (0.083 %) nebu 10/25/2021 at Unknown time Self Yes Yes   Sig: INHALE 3ML BY NEBULIZATION 4 TIMES A DAY AS NEEDED   albuterol-ipratropium (DUO-NEB) 2.5 mg-0.5 mg/3 ml nebu 10/25/2021 at Unknown time Self Yes Yes   Sig: Take 3 mL by inhalation. amLODIPine (NORVASC) 5 mg tablet 10/25/2021 at Unknown time Self Yes Yes   Sig: Take 5 mg by mouth daily. apixaban (ELIQUIS) 5 mg tablet 10/25/2021 at Unknown time Self Yes Yes   Sig: Take 5 mg by mouth two (2) times a day. atenoloL (TENORMIN) 25 mg tablet 10/25/2021 at Unknown time Self Yes Yes   Sig: Take 25 mg by mouth daily. cyanocobalamin, vitamin B-12, (VITAMIN B12 PO) 10/25/2021 at Unknown time Self Yes Yes   Sig: Take  by mouth. diclofenac (VOLTAREN) 1 % gel 9/26/2021 at Unknown time Self Yes Yes   Sig: Apply  to affected area. escitalopram oxalate (LEXAPRO) 10 mg tablet 10/19/2021 at Unknown time Self Yes Yes   Sig: Take 10 mg by mouth daily. fluticasone furoate-vilanteroL (Breo Ellipta) 100-25 mcg/dose inhaler 10/25/2021 at Unknown time Self Yes Yes   Sig: Take 1 Puff by inhalation daily. ketoconazole (NIZORAL) 2 % shampoo 10/19/2021 at Unknown time Self Yes Yes   Sig: Shampoo with weekly 1-2 times. montelukast (SINGULAIR) 10 mg tablet 10/25/2021 at Unknown time Self Yes Yes   Sig: Take 10 mg by mouth At bedtime.    ondansetron hcl (ZOFRAN) 4 mg tablet Unknown at Unknown time Self Yes No   Sig: TAKE 1 TABLET(4 MG) BY MOUTH EVERY 8 HOURS FOR UP TO 10 DAYS AS NEEDED FOR NAUSEA OR VOMITING   pantoprazole (PROTONIX) 40 mg tablet 10/19/2021 at Unknown time Self Yes Yes   Sig: Take 40 mg by mouth daily. polyethylene glycol (MIRALAX) 17 gram/dose powder 2021 at Unknown time Self Yes Yes   Si scoop po daily in a drink prn constipation. rOPINIRole (REQUIP) 3 mg tab tab 10/25/2021 at Unknown time Self Yes Yes   Sig: TAKE 1 TABLET FOUR TIMES A DAY   valsartan (DIOVAN) 320 mg tablet 10/25/2021 at Unknown time Self Yes Yes   Sig: Take 320 mg by mouth daily. zolpidem (AMBIEN) 10 mg tablet 10/25/2021 at Unknown time Self Yes Yes   Si/2 to 1 po qhs prn insomnia. Facility-Administered Medications: None     Imaging:    Medications list Personally Reviewed   [x]      Yes     []               No    Thank you for allowing us to participate in the care this patient. We will follow patient with you. Signed By: Adolfo Mckee MD  [facility]  [facility] SYSTM FRANCISCounts include [facility] the [facility] [address], [address], [address] [place]  Phone - [phone]         Fax - [phone] [facility] Office  [address] [place] [address]  Phone - [phone]        Fax - [phone]     www. [facility] [employer]

## 2021-10-29 NOTE — PROGRESS NOTES
1900 Bedside and Verbal shift change report given to [address] Oakes (oncoming nurse) by Levy Ramsey RN (offgoing nurse). Report included the following information SBAR, Kardex, Intake/Output, MAR and Cardiac Rhythm A fib.     2242 Patient reports back pain, applied lidocaine patch for patient discomfort.     0352 Patient reports severe back pain, \"6/10\" administered pain medication for patient discomfort. 0745 Patient states morphine caused nausea, administered Zofran for patient comfort.     0700 Bedside shift change report given to [id number] ROGERLorena Azam Ahn (oncoming nurse) by Shasha Pedroza, JESUS and Amos Sanchez RN (offgoing nurse). Report included the following information SBAR, Kardex, Intake/Output, MAR, Recent Results and Cardiac Rhythm A fib    Shift worked:  [id number]     Shift summary and any significant changes:     acute back pain     Concerns for physician to address:  back pain      Zone phone for oncoming shift:          Activity:  Activity Level: Bed Rest  Number times ambulated in hallways past shift: 0  Number of times OOB to chair past shift: 0    Cardiac:   Cardiac Monitoring: Yes      Cardiac Rhythm: Atrial Fib    Access:   Current line(s): PIV     Genitourinary:   Urinary status: external catheter    Respiratory:   O2 Device: Nasal cannula  Chronic home O2 use?: NO  Incentive spirometer at bedside: YES     GI:  Last Bowel Movement Date: 10/26/21  Current diet:  ADULT DIET Regular; 3 carb choices (45 gm/meal); No Salt Added (3-4 gm)  Passing flatus: YES  Tolerating current diet: NO       Pain Management:   Patient states pain is manageable on current regimen: NO    Skin:  Malik Score: 15  Interventions: increase time out of bed, PT/OT consult and internal/external urinary devices    Patient Safety:  Fall Score:  Total Score: 3  Interventions: bed/chair alarm, gripper socks and pt to call before getting OOB  High Fall Risk: Yes    Length of Stay:  Expected LOS: 4d 19h  Actual LOS: [address], RN

## 2021-10-29 NOTE — PROGRESS NOTES
Hospitalist Progress Note    NAME: Nani Dye   :  3/7/1927   MRN:  [id number]       Assessment / Plan:  Acute hypoxic respiratory failure, POA Likely secondary to   Acute congestive heart failure, POA  Cardiogenic pulmonary edema, POA  SIRS (tachycardia and leukocytosis) with no obvious source of infection    Was admitted initially to ICU and transferred to floor same day  She still has bilateral crackles and LE edema  Will cw lasix iv 40 q12  ECHO show EF 35-40, elevated BNP, CXR show pulmonary edema and bilateral effusion  Daily weights, I and O  Will stop Steroid, its not COPD  C/w nebulizers  UA negative, CXR doesn't show consolidation  Procal .27, lactate normal  C/w emperic azithromycin for now     A. fib with RVR  History of paroxysmal A. fib  Hold eliquis d/t low GFR  On atenolol   Controlled today     ROCÍO on CKD stage III  Creatinine worse to 2.4 today  Likely cardiorenal syndrome + Left hydronephrosis, Urology following  Bladder scan, if retaining will get Shelley catheter  C/w iv diuretics  CT abdomen showing left hydronephrosis  Nephrotoxic agents if possible,continue IV diuresis    Low back pain:  Complains of left low back pain  CT show: 1. Severe progressive left hydronephrosis with neoplasia not excluded. 2. Lumbar degenerative disc disease as detailed. No fracture.   Could be d/t hydronephrosis, pain control     25.0 - 29.9 Overweight / Body mass index is 29.2 kg/m².     Estimated discharge date:   Barriers: Clinical improvement     Code status: DNR  Prophylaxis: Eliquis on hold  Recommended Disposition: Home w/Family     Subjective:     Chief Complaint / Reason for Physician Visit  Follow up for ARF,/Afib RVR  She feels nausesous and has left back pain    Review of Systems:  Symptom Y/N Comments  Symptom Y/N Comments   Fever/Chills n   Chest Pain n    Poor Appetite n   Edema     Cough    Abdominal Pain     Sputum    Joint Pain     SOB/MATTA    Pruritis/Rash     Nausea/vomit Tolerating PT/OT     Diarrhea    Tolerating Diet     Constipation    Other       Could NOT obtain due to:      Objective:     VITALS:   Last 24hrs VS reviewed since prior progress note. Most recent are:  Patient Vitals for the past 24 hrs:   Temp Pulse Resp BP SpO2   10/29/21 1132 98.4 °F (36.9 °C) 87 19 (!) 153/72 95 %   10/29/21 1114  84 18 (!) 153/72 97 %   10/29/21 0945  85  (!) 156/104 95 %   10/29/21 0815     96 %   10/29/21 0800     (!) 86 %   10/29/21 0745 98.1 °F (36.7 °C) 81 19 (!) 164/66 96 %   10/29/21 0346 97.5 °F (36.4 °C) 72 20 (!) 144/49 96 %   10/28/21 2332 97.5 °F (36.4 °C) 76 18 (!) 127/57 98 %   10/28/21 1959 97.6 °F (36.4 °C) 71 20 (!) 123/59 98 %   10/28/21 1600  82      10/28/21 1441 98.2 °F (36.8 °C) 82 21 (!) 143/63 95 %       Intake/Output Summary (Last 24 hours) at 10/29/2021 1428  Last data filed at 10/28/2021 1709  Gross per 24 hour   Intake    Output 500 ml   Net -500 ml        I had a face to face encounter and independently examined this patient on 10/29/2021, as outlined below:  PHYSICAL EXAM:  General: WD, WN. Alert, cooperative, no acute distress    EENT:  EOMI. Anicteric sclerae. MMM  Resp:  B/l crackles  CV:  Regular  rhythm,  Edema b/l LE L>R  GI:  Soft, Non distended, Non tender. +Bowel sounds  Neurologic:  Alert and oriented X 3, normal speech,   Psych:   Good insight. Not anxious nor agitated  Skin:  No rashes.   No jaundice    Reviewed most current lab test results and cultures  YES  Reviewed most current radiology test results   YES  Review and summation of old records today    NO  Reviewed patient's current orders and MAR    YES  PMH/SH reviewed - no change compared to H&P  ________________________________________________________________________  Care Plan discussed with:    Comments   Patient y    Family      RN y    Care Manager     Consultant                        Multidiciplinary team rounds were held today with , nursing, pharmacist and clinical coordinator. Patient's plan of care was discussed; medications were reviewed and discharge planning was addressed. ________________________________________________________________________  Total NON critical care TIME: 35   Minutes    Total CRITICAL CARE TIME Spent:   Minutes non procedure based      Comments   >50% of visit spent in counseling and coordination of care     ________________________________________________________________________  Vikki Blue MD     Procedures: see electronic medical records for all procedures/Xrays and details which were not copied into this note but were reviewed prior to creation of Plan. LABS:  I reviewed today's most current labs and imaging studies.   Pertinent labs include:  Recent Labs     10/29/21  0514 10/28/21  0904 10/27/21  1305   WBC 16.8* 14.6* 18.4*   HGB 13.0 13.5 14.6   HCT 38.8 41.5 44.5   * 106* 161     Recent Labs     10/29/21  0514 10/28/21  0904 10/27/21  0434   * 134* 140   K 4.5 4.9 4.4   CL 97 101 106   CO2 28 25 28   * 220* 135*   BUN 73* 65* 50*   CREA 2.21* 2.43* 1.51*   CA 9.2 9.1 8.7   MG  --   --  2.4   ALB  --  3.1*  --    TBILI  --  0.8  --    ALT  --  93*  --        Signed: Vikki Blue MD

## 2021-10-29 NOTE — CONSULTS
+++ addendum     CT abd reviewed with Dr. Elyssa Angelo. Bilateral atrophic kidneys. 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 Unsure whether patient left flank pain is intermittently chronic, she is poor historian. Urology to follow tomorrow  Check PVR and place esparza for retention. Daily renal labs                       Urology Consult    Patient: Amanda Willoughby MRN: [id number]  SSN: [id number]    YOB: 1927  Age: 80 y.o. Sex: female          Date of Consultation:  October 29, 2021  Requesting Physician: Moses Levy MD  Reason for Consultation: left renal pelvocaliectasis           Assessment/Plan:  Left lower back pain with Renal US showing moderate left renal pelvocaliectasis. CT spine suggestive of severe progressive left hydronephrosis-pending CT abd non con stat this afternoon to further evaluate  Possible atrophic right kidney   Sepsis rule out. UA clean. Azithromycin. Blood cx NGTD    -await results of CT abd/pelvis to further evaluate hydronephrosis  -nursing to obtain bladder scan, place esparza for retention >350ml   -urology will follow    Supervising MD, Dr. Dee Dee Crum        History of Present Illness:  Patient is a 80 y.o. female admitted 10/26/2021 to the hospital for Acute respiratory failure with hypoxia ([facility] [address] [J96.01]. She has a PMH of HF (EF unknown), (COPD, stage unknown, does not wear oxygen at home), Afib with RVR, CKD stage 3, Previous TAVR, anxiety, HLD, and DM. She presented to the ED at [facility] with shortness of breath that had been worsening over a period of about 1 week accompanied by increasing BLE edema. She was transferred to [facility] for further work up for COPD/CHF exacerbation and sepsis rule out. She is a DNR/DNI  Urology was consulted after renal US was ordered to rule out hydrononephrosis for left back pain. Not a known patient of [facility]. Denies urologic hx.  States having persistent left flank pain. Patient emptying urine in purwick, clear yellow. Chart reviewed:  DNR  Af,vss on NC  Good UOP in purwick 1700ml  Wbc up to 16.8, hgb stable  UA clean  Creat 1.5 on admission, had creat bump to 2.4 yesterday. Now 2.21. Renal US ordered after creat bump and with left flank pain   CT spine reviewed with Dr. Dillard Mode:  Paraspinal soft tissues show severe left hydronephrosis, etiology uncertain,  with neoplasia not excluded. This appears increased since Renal Sonogram  10/27/2021. Past Medical History: Allergies   Allergen Reactions    Pcn [Penicillins] Not Reported This Time     Tolerated ceftriaxone 10/2021    Sulfa (Sulfonamide Antibiotics) Not Reported This Time      Prior to Admission medications    Medication Sig Start Date End Date Taking? Authorizing Provider   albuterol (PROVENTIL VENTOLIN) 2.5 mg /3 mL (0.083 %) nebu INHALE 3ML BY NEBULIZATION 4 TIMES A DAY AS NEEDED 8/31/21  Yes Provider, Historical   amLODIPine (NORVASC) 5 mg tablet Take 5 mg by mouth daily. 3/3/21  Yes Provider, Historical   atenoloL (TENORMIN) 25 mg tablet Take 25 mg by mouth daily. 10/1/21  Yes Provider, Historical   diclofenac (VOLTAREN) 1 % gel Apply  to affected area. 5/5/21  Yes Provider, Historical   escitalopram oxalate (LEXAPRO) 10 mg tablet Take 10 mg by mouth daily. 9/28/21 3/27/22 Yes Provider, Historical   albuterol-ipratropium (DUO-NEB) 2.5 mg-0.5 mg/3 ml nebu Take 3 mL by inhalation. 6/14/21 6/14/22 Yes Provider, Historical   ketoconazole (NIZORAL) 2 % shampoo Shampoo with weekly 1-2 times. 7/29/21  Yes Provider, Historical   montelukast (SINGULAIR) 10 mg tablet Take 10 mg by mouth At bedtime. 1/7/21  Yes Provider, Historical   pantoprazole (PROTONIX) 40 mg tablet Take 40 mg by mouth daily. 10/18/21  Yes Provider, Historical   polyethylene glycol (MIRALAX) 17 gram/dose powder 1 scoop po daily in a drink prn constipation.  7/29/21  Yes Provider, Historical   rOPINIRole (REQUIP) 3 mg tab tab TAKE 1 TABLET FOUR TIMES A DAY 12/29/20  Yes Provider, Historical   valsartan (DIOVAN) 320 mg tablet Take 320 mg by mouth daily. 2/15/21  Yes Provider, Historical   zolpidem (AMBIEN) 10 mg tablet 1/2 to 1 po qhs prn insomnia. 9/17/21  Yes Provider, Historical   apixaban (ELIQUIS) 5 mg tablet Take 5 mg by mouth two (2) times a day. Yes Provider, Historical   fluticasone furoate-vilanteroL (Breo Ellipta) 100-25 mcg/dose inhaler Take 1 Puff by inhalation daily. Yes Provider, Historical   acetaminophen (TYLENOL) 325 mg tablet Take (1/2) tablet in the morning and (1/2) tablet at nights   Yes Provider, Historical   cyanocobalamin, vitamin B-12, (VITAMIN B12 PO) Take  by mouth. Yes Provider, Historical   ondansetron hcl (ZOFRAN) 4 mg tablet TAKE 1 TABLET(4 MG) BY MOUTH EVERY 8 HOURS FOR UP TO 10 DAYS AS NEEDED FOR NAUSEA OR VOMITING 7/22/21   Provider, Historical      PMHx:  has no past medical history on file. PSurgHx:  has no past surgical history on file. PSocHx:     ROS:  Admission ROS by Cedric Saucedo MD from 10/26/2021 were reviewed with the patient and changes (other than per HPI) include: none.     Physical Exam  General Appearance: NAD, awake  HENT: atraumatic, normal ears  Cardiovascular: not tachycardic, no LE edema  Respiratory: no distress, on NC  Abdomen: soft, no suprapubic fullness or tenderness  : left cva tenderness, clear yellow UA in purwick  Extremities: moves all  Musculoskeletal: normal alignment of neck and head  Neuro: Appropriate, no focal neurological deficits  Mood/Affect: appropriate, A&O x 3      Lab Results   Component Value Date/Time    WBC 16.8 (H) 10/29/2021 05:14 AM    HCT 38.8 10/29/2021 05:14 AM    PLATELET 769 (L) 24/45/5610 05:14 AM    Sodium 133 (L) 10/29/2021 05:14 AM    Potassium 4.5 10/29/2021 05:14 AM    Chloride 97 10/29/2021 05:14 AM    CO2 28 10/29/2021 05:14 AM    BUN 73 (H) 10/29/2021 05:14 AM    Creatinine 2.21 (H) 10/29/2021 05:14 AM    Glucose 156 (H) 10/29/2021 05:14 AM    Calcium 9.2 10/29/2021 05:14 AM    Magnesium 2.4 10/27/2021 04:34 AM    INR 1.1 10/26/2021 01:36 AM       UA:   Lab Results   Component Value Date/Time    Color YELLOW/STRAW 10/27/2021 02:18 PM    Appearance CLEAR 10/27/2021 02:18 PM    Specific gravity 1.008 10/27/2021 02:18 PM    pH (UA) 5.0 10/27/2021 02:18 PM    Protein Negative 10/27/2021 02:18 PM    Glucose Negative 10/27/2021 02:18 PM    Ketone Negative 10/27/2021 02:18 PM    Bilirubin Negative 10/27/2021 02:18 PM    Urobilinogen 0.2 10/27/2021 02:18 PM    Nitrites Negative 10/27/2021 02:18 PM    Leukocyte Esterase Negative 10/27/2021 02:18 PM    Epithelial cells FEW 10/27/2021 02:18 PM    Bacteria Negative 10/27/2021 02:18 PM    WBC 0-4 10/27/2021 02:18 PM    RBC 0-5 10/27/2021 02:18 PM           Signed By: Camilo Green NP  - October 29, 2021

## 2021-10-29 NOTE — PROGRESS NOTES
0700: Bedside shift change report received from [address]ian [address] (off going nurse) to Seferino Hooker RN (oncoming nurse). Report included the following information: SBAR, Kardex, Intake/Output, MAR, Recent Results and Cardiac Rhythm. End of Shift Note      1900: Bedside shift change report given to Brandan rust RN (oncoming nurse) by Seferino Hooker RN (offgoing nurse). Report included the following information: Report included the following information: SBAR, Kardex, Intake/Output, MAR, Recent Results and Cardiac Rhythm. Shift worked:  [id number]     Shift summary and any significant changes:     - Pain control for back: Robaxin unsuccessful, refusing narcotics/opioids   - Nausea control : Compazine successful   - Unsuccessful wean to room air - desat to 86%   - IV Metoprolol x1 for SBP >160   - CT spine completed : see results    - CT abd/pelvis completed : see results       Concerns for physician to address:  - Pain control      Zone phone for oncoming shift:          Activity:  Activity Level: Bed Rest  Number times ambulated in hallways past shift: 0  Number of times OOB to chair past shift: 0    Neuro: WNL    Cardiac:   Cardiac Monitoring: Yes      Cardiac Rhythm: Atrial Fib    Access:   Current line(s): PIV     Genitourinary:   Urinary status: voiding and external catheter    Respiratory:   O2 Device: Nasal cannula  Chronic home O2 use?: NO  Incentive spirometer at bedside: N/A     GI:  Last Bowel Movement Date: 10/24/21 (per patient)  Current diet:  ADULT DIET Regular; 3 carb choices (45 gm/meal); No Salt Added (3-4 gm)  Passing flatus: YES  Tolerating current diet: YES       Pain Management:   Patient states pain is manageable on current regimen: NO    Skin:  Mlaik Score: 15  Interventions: speciality bed, float heels, increase time out of bed, foam dressing, PT/OT consult, limit briefs, internal/external urinary devices, and nutritional support     Patient Safety:  Fall Score:  Total Score: 3  Interventions: bed/chair alarm, assistive device (walker, cane, etc), gripper socks, and pt to call before getting OOB  High Fall Risk: Yes    Length of Stay:  Expected LOS: 4d 19h  Actual LOS: 3      CHELY CuevasN, RN, CCRN                   Problem: Pressure Injury - Risk of  Goal: *Prevention of pressure injury  Description: Document Malik Scale and appropriate interventions in the flowsheet. Outcome: Progressing Towards Goal  Note: Pressure Injury Interventions:  Sensory Interventions: Assess changes in LOC, Assess need for specialty bed, Avoid rigorous massage over bony prominences, Chair cushion, Check visual cues for pain, Discuss PT/OT consult with provider, Float heels, Maintain/enhance activity level, Keep linens dry and wrinkle-free, Minimize linen layers, Monitor skin under medical devices, Turn and reposition approx. every two hours (pillows and wedges if needed)    Moisture Interventions: Absorbent underpads, Apply protective barrier, creams and emollients, Assess need for specialty bed, Check for incontinence Q2 hours and as needed, Internal/External urinary devices, Limit adult briefs, Maintain skin hydration (lotion/cream), Offer toileting Q_hr, Moisture barrier, Minimize layers    Activity Interventions: Assess need for specialty bed, Increase time out of bed, Pressure redistribution bed/mattress(bed type), PT/OT evaluation    Mobility Interventions: Assess need for specialty bed, Float heels, Pressure redistribution bed/mattress (bed type), PT/OT evaluation, Turn and reposition approx.  every two hours(pillow and wedges)    Nutrition Interventions: Document food/fluid/supplement intake, Offer support with meals,snacks and hydration    Friction and Shear Interventions: Apply protective barrier, creams and emollients, Feet elevated on foot rest, Transferring/repositioning devices, Minimize layers, Lift team/patient mobility team                Problem: Patient Education: Go to Patient Education Activity  Goal: Patient/Family Education  Outcome: Progressing Towards Goal     Problem: Risk for Spread of Infection  Goal: Prevent transmission of infectious organism to others  Description: Prevent the transmission of infectious organisms to other patients, staff members, and visitors. Outcome: Progressing Towards Goal     Problem: Patient Education:  Go to Education Activity  Goal: Patient/Family Education  Outcome: Progressing Towards Goal     Problem: Falls - Risk of  Goal: *Absence of Falls  Description: Document Noe Jaramillo Fall Risk and appropriate interventions in the flowsheet.   Outcome: Progressing Towards Goal  Note: Fall Risk Interventions:  Mobility Interventions: Bed/chair exit alarm, Communicate number of staff needed for ambulation/transfer, Mechanical lift, OT consult for ADLs, Patient to call before getting OOB, PT Consult for mobility concerns, PT Consult for assist device competence, Strengthening exercises (ROM-active/passive)         Medication Interventions: Assess postural VS orthostatic hypotension, Bed/chair exit alarm, Evaluate medications/consider consulting pharmacy, Patient to call before getting OOB, Teach patient to arise slowly    Elimination Interventions: Bed/chair exit alarm, Call light in reach, Patient to call for help with toileting needs, Toilet paper/wipes in reach, Toileting schedule/hourly rounds              Problem: Patient Education: Go to Patient Education Activity  Goal: Patient/Family Education  Outcome: Progressing Towards Goal

## 2021-10-30 LAB
ANION GAP SERPL CALC-SCNC: 8 MMOL/L (ref 5–15)
BUN SERPL-MCNC: 86 MG/DL (ref 6–20)
BUN/CREAT SERPL: 32 (ref 12–20)
CALCIUM SERPL-MCNC: 8.9 MG/DL (ref 8.5–10.1)
CHLORIDE SERPL-SCNC: 96 MMOL/L (ref 97–108)
CO2 SERPL-SCNC: 28 MMOL/L (ref 21–32)
CREAT SERPL-MCNC: 2.73 MG/DL (ref 0.55–1.02)
ERYTHROCYTE [DISTWIDTH] IN BLOOD BY AUTOMATED COUNT: 12.3 % (ref 11.5–14.5)
GLUCOSE BLD STRIP.AUTO-MCNC: 129 MG/DL (ref 65–117)
GLUCOSE BLD STRIP.AUTO-MCNC: 140 MG/DL (ref 65–117)
GLUCOSE BLD STRIP.AUTO-MCNC: 149 MG/DL (ref 65–117)
GLUCOSE BLD STRIP.AUTO-MCNC: 198 MG/DL (ref 65–117)
GLUCOSE SERPL-MCNC: 158 MG/DL (ref 65–100)
HCT VFR BLD AUTO: 40.4 % (ref 35–47)
HGB BLD-MCNC: 13 G/DL (ref 11.5–16)
MCH RBC QN AUTO: 31.6 PG (ref 26–34)
MCHC RBC AUTO-ENTMCNC: 32.2 G/DL (ref 30–36.5)
MCV RBC AUTO: 98.1 FL (ref 80–99)
NRBC # BLD: 0 K/UL (ref 0–0.01)
NRBC BLD-RTO: 0 PER 100 WBC
PLATELET # BLD AUTO: 91 K/UL (ref 150–400)
PMV BLD AUTO: 12.3 FL (ref 8.9–12.9)
POTASSIUM SERPL-SCNC: 4.9 MMOL/L (ref 3.5–5.1)
RBC # BLD AUTO: 4.12 M/UL (ref 3.8–5.2)
SERVICE CMNT-IMP: ABNORMAL
SODIUM SERPL-SCNC: 132 MMOL/L (ref 136–145)
WBC # BLD AUTO: 14.7 K/UL (ref 3.6–11)

## 2021-10-30 PROCEDURE — 82962 GLUCOSE BLOOD TEST: CPT

## 2021-10-30 PROCEDURE — 74011250637 HC RX REV CODE- 250/637: Performed by: NURSE PRACTITIONER

## 2021-10-30 PROCEDURE — 65660000000 HC RM CCU STEPDOWN

## 2021-10-30 PROCEDURE — 80048 BASIC METABOLIC PNL TOTAL CA: CPT

## 2021-10-30 PROCEDURE — 85027 COMPLETE CBC AUTOMATED: CPT

## 2021-10-30 PROCEDURE — 74011636637 HC RX REV CODE- 636/637: Performed by: NURSE PRACTITIONER

## 2021-10-30 PROCEDURE — 77010033678 HC OXYGEN DAILY

## 2021-10-30 PROCEDURE — 74011000250 HC RX REV CODE- 250: Performed by: INTERNAL MEDICINE

## 2021-10-30 PROCEDURE — 36415 COLL VENOUS BLD VENIPUNCTURE: CPT

## 2021-10-30 PROCEDURE — 94640 AIRWAY INHALATION TREATMENT: CPT

## 2021-10-30 PROCEDURE — 74011000250 HC RX REV CODE- 250: Performed by: NURSE PRACTITIONER

## 2021-10-30 PROCEDURE — 74011250636 HC RX REV CODE- 250/636: Performed by: INTERNAL MEDICINE

## 2021-10-30 RX ORDER — DOCUSATE SODIUM 50 MG/5ML
100 LIQUID ORAL DAILY
Status: DISCONTINUED | OUTPATIENT
Start: 2021-10-31 | End: 2021-10-31

## 2021-10-30 RX ADMIN — INSULIN LISPRO 3 UNITS: 100 INJECTION, SOLUTION INTRAVENOUS; SUBCUTANEOUS at 09:03

## 2021-10-30 RX ADMIN — SODIUM CHLORIDE 20 ML: 9 INJECTION, SOLUTION INTRAMUSCULAR; INTRAVENOUS; SUBCUTANEOUS at 16:49

## 2021-10-30 RX ADMIN — ARFORMOTEROL TARTRATE 15 MCG: 15 SOLUTION RESPIRATORY (INHALATION) at 19:52

## 2021-10-30 RX ADMIN — FUROSEMIDE 40 MG: 10 INJECTION, SOLUTION INTRAMUSCULAR; INTRAVENOUS at 18:28

## 2021-10-30 RX ADMIN — APIXABAN 2.5 MG: 2.5 TABLET, FILM COATED ORAL at 21:44

## 2021-10-30 RX ADMIN — FUROSEMIDE 40 MG: 10 INJECTION, SOLUTION INTRAMUSCULAR; INTRAVENOUS at 09:04

## 2021-10-30 RX ADMIN — ATENOLOL 25 MG: 25 TABLET ORAL at 09:04

## 2021-10-30 RX ADMIN — ARFORMOTEROL TARTRATE 15 MCG: 15 SOLUTION RESPIRATORY (INHALATION) at 07:22

## 2021-10-30 RX ADMIN — SODIUM CHLORIDE 10 ML: 9 INJECTION, SOLUTION INTRAMUSCULAR; INTRAVENOUS; SUBCUTANEOUS at 09:07

## 2021-10-30 RX ADMIN — PANTOPRAZOLE SODIUM 40 MG: 40 TABLET, DELAYED RELEASE ORAL at 09:04

## 2021-10-30 RX ADMIN — SODIUM CHLORIDE 10 ML: 9 INJECTION, SOLUTION INTRAMUSCULAR; INTRAVENOUS; SUBCUTANEOUS at 21:57

## 2021-10-30 RX ADMIN — ROPINIROLE HYDROCHLORIDE 3 MG: 1 TABLET, FILM COATED ORAL at 21:57

## 2021-10-30 RX ADMIN — INSULIN LISPRO 3 UNITS: 100 INJECTION, SOLUTION INTRAVENOUS; SUBCUTANEOUS at 12:57

## 2021-10-30 RX ADMIN — BUDESONIDE 250 MCG: 0.25 INHALANT RESPIRATORY (INHALATION) at 07:22

## 2021-10-30 RX ADMIN — BUDESONIDE 250 MCG: 0.25 INHALANT RESPIRATORY (INHALATION) at 19:51

## 2021-10-30 RX ADMIN — INSULIN LISPRO 2 UNITS: 100 INJECTION, SOLUTION INTRAVENOUS; SUBCUTANEOUS at 18:28

## 2021-10-30 RX ADMIN — AZITHROMYCIN MONOHYDRATE 500 MG: 500 INJECTION, POWDER, LYOPHILIZED, FOR SOLUTION INTRAVENOUS at 16:49

## 2021-10-30 RX ADMIN — ROPINIROLE HYDROCHLORIDE 3 MG: 1 TABLET, FILM COATED ORAL at 18:08

## 2021-10-30 RX ADMIN — ROPINIROLE HYDROCHLORIDE 3 MG: 1 TABLET, FILM COATED ORAL at 12:57

## 2021-10-30 RX ADMIN — MONTELUKAST 10 MG: 10 TABLET, FILM COATED ORAL at 21:44

## 2021-10-30 NOTE — PROGRESS NOTES
Hospitalist Progress Note    NAME: Caleb Gutierrez   :  3/7/1927   MRN:  [id number]       Assessment / Plan:  Acute hypoxic respiratory failure, POA Likely secondary to   Acute congestive heart failure, POA  Cardiogenic pulmonary edema, POA  SIRS (tachycardia and leukocytosis) with no obvious source of infection    Was admitted initially to ICU and transferred to floor same day  Will cw lasix iv 40 q12, on 2 liters oxygen, titrate as tolerated. ECHO show EF 35-40, elevated BNP, CXR show pulmonary edema and bilateral effusion  Daily weights, I and O  Steroids stopped as this is not COPD exacerbation  C/w nebulizers  UA negative, CXR doesn't show consolidation  Procal .27, lactate normal  C/w emperic azithromycin for now     A. fib with RVR  History of paroxysmal A. fib  Will resume eliquis, cleared by nephrology  On atenolol   Controlled today     ROCÍO on CKD stage III  Creatinine trending up  Likely cardiorenal syndrome + Left hydronephrosis, Urology following  Bladder scan didn't show significant retention  C/w iv diuretics  CT abdomen showing left hydronephrosis  Nephrotoxic agents if possible,continue IV diuresis    Low back pain:  Complains of left low back pain  CT show: 1. Severe progressive left hydronephrosis with neoplasia not excluded. 2. Lumbar degenerative disc disease as detailed. No fracture.   Could be d/t hydronephrosis, pain control  CT also showed Hiatal hernia with non obstructive volvulus, however not having any symptoms from this today, will monitor     25.0 - 29.9 Overweight / Body mass index is 29.2 kg/m².     Estimated discharge date:   Barriers: Clinical improvement     Code status: DNR  Prophylaxis: Eliquis   Recommended Disposition: Home w/Family     Subjective:     Chief Complaint / Reason for Physician Visit  Follow up for ARF,/Afib RVR  She feels better today    Review of Systems:  Symptom Y/N Comments  Symptom Y/N Comments   Fever/Chills n   Chest Pain n    Poor Appetite n Edema     Cough    Abdominal Pain     Sputum    Joint Pain     SOB/MATTA    Pruritis/Rash     Nausea/vomit    Tolerating PT/OT     Diarrhea    Tolerating Diet     Constipation    Other       Could NOT obtain due to:      Objective:     VITALS:   Last 24hrs VS reviewed since prior progress note. Most recent are:  Patient Vitals for the past 24 hrs:   Temp Pulse Resp BP SpO2   10/30/21 1129 99.2 °F (37.3 °C) 75 25 (!) 119/56 97 %   10/30/21 0728 98 °F (36.7 °C) 72 15 (!) 141/54 100 %   10/30/21 0725     100 %   10/30/21 0352 98 °F (36.7 °C) 80 23 (!) 133/37 99 %   10/29/21 2246 97.8 °F (36.6 °C) 88 25 (!) 145/75 98 %   10/29/21 1938     97 %   10/29/21 1932 98 °F (36.7 °C) 74 17 (!) 122/50 97 %   10/29/21 1530 98.3 °F (36.8 °C) 78 20 (!) 113/46 98 %       Intake/Output Summary (Last 24 hours) at 10/30/2021 1204  Last data filed at 10/30/2021 0352  Gross per 24 hour   Intake 487 ml   Output 1600 ml   Net -1113 ml        I had a face to face encounter and independently examined this patient on 10/30/2021, as outlined below:  PHYSICAL EXAM:  General: WD, WN. Alert, cooperative, no acute distress    EENT:  EOMI. Anicteric sclerae. MMM  Resp:  B/l crackles  CV:  Regular  rhythm,  Edema b/l LE L>R  GI:  Soft, Non distended, Non tender. +Bowel sounds  Neurologic:  Alert and oriented X 3, normal speech,   Psych:   Good insight. Not anxious nor agitated  Skin:  No rashes.   No jaundice    Reviewed most current lab test results and cultures  YES  Reviewed most current radiology test results   YES  Review and summation of old records today    NO  Reviewed patient's current orders and MAR    YES  PMH/SH reviewed - no change compared to H&P  ________________________________________________________________________  Care Plan discussed with:    Comments   Patient y    Family      RN y    Care Manager     Consultant                        Multidiciplinary team rounds were held today with , nursing, pharmacist and clinical coordinator. Patient's plan of care was discussed; medications were reviewed and discharge planning was addressed. ________________________________________________________________________  Total NON critical care TIME: 35   Minutes    Total CRITICAL CARE TIME Spent:   Minutes non procedure based      Comments   >50% of visit spent in counseling and coordination of care     ________________________________________________________________________  Ozzy Lopez MD     Procedures: see electronic medical records for all procedures/Xrays and details which were not copied into this note but were reviewed prior to creation of Plan. LABS:  I reviewed today's most current labs and imaging studies.   Pertinent labs include:  Recent Labs     10/30/21  0414 10/29/21  0514 10/28/21  0904   WBC 14.7* 16.8* 14.6*   HGB 13.0 13.0 13.5   HCT 40.4 38.8 41.5   PLT 91* 104* 106*     Recent Labs     10/30/21  0414 10/29/21  0514 10/28/21  0904   * 133* 134*   K 4.9 4.5 4.9   CL 96* 97 101   CO2 28 28 25   * 156* 220*   BUN 86* 73* 65*   CREA 2.73* 2.21* 2.43*   CA 8.9 9.2 9.1   ALB  --   --  3.1*   TBILI  --   --  0.8   ALT  --   --  93*       Signed: Ozzy Lopez MD

## 2021-10-30 NOTE — PROGRESS NOTES
Aquilino Vides  YOB: 1927          Assessment & Plan:   ROCÍO on CKD, multifactorial  CKD 3b, baseline Cr 1.5  Severe L hydro, UPJ obstruction ?cause  CHF  Af with RVR  HTN    Rec:  No acute indication HD  Continue IV lasix. Titrate as needed  Continue current antihypertensives  OK for eliquis from renal standpoint  Urology following regarding hydro       Subjective:   CC: f/u ROCÍO  HPI: Creat a bit better today. CT shows severe L hydro with UPJ obstruction of uncertain cause. Fluid balance neg on lasix.  HTN is reasonably controlled   ROS: Denies sob/n/v  Current Facility-Administered Medications   Medication Dose Route Frequency    prochlorperazine (COMPAZINE) with saline injection 5 mg  5 mg IntraVENous Q6H PRN    methocarbamoL (ROBAXIN) tablet 750 mg  750 mg Oral TID PRN    oxyCODONE IR (ROXICODONE) tablet 5 mg  5 mg Oral Q4H PRN    furosemide (LASIX) injection 40 mg  40 mg IntraVENous BID    azithromycin (ZITHROMAX) 500 mg in 0.9% sodium chloride 250 mL (VIAL-MATE)  500 mg IntraVENous Q24H    budesonide (PULMICORT) 250 mcg/2ml nebulizer susp  250 mcg Nebulization BID RT    And    arformoteroL (BROVANA) neb solution 15 mcg  15 mcg Nebulization BID RT    morphine injection 1 mg  1 mg IntraVENous Q4H PRN    melatonin tablet 6 mg  6 mg Oral QHS PRN    metoprolol (LOPRESSOR) injection 5 mg  5 mg IntraVENous Q6H PRN    lidocaine 4 % patch 1 Patch  1 Patch TransDERmal Q24H    sodium chloride (NS) flush 5-40 mL  5-40 mL IntraVENous Q8H    sodium chloride (NS) flush 5-40 mL  5-40 mL IntraVENous PRN    acetaminophen (TYLENOL) tablet 650 mg  650 mg Oral Q6H PRN    Or    acetaminophen (TYLENOL) suppository 650 mg  650 mg Rectal Q6H PRN    polyethylene glycol (MIRALAX) packet 17 g  17 g Oral DAILY PRN    ondansetron (ZOFRAN) injection 4 mg  4 mg IntraVENous Q6H PRN    pantoprazole (PROTONIX) tablet 40 mg  40 mg Oral DAILY    montelukast (SINGULAIR) tablet 10 mg  10 mg Oral QHS    [Held by provider] polyethylene glycol (MIRALAX) packet 17 g  17 g Oral DAILY    rOPINIRole (REQUIP) tablet 3 mg  3 mg Oral TID    glucose chewable tablet 16 g  4 Tablet Oral PRN    dextrose (D50W) injection syrg 12.5-25 g  12.5-25 g IntraVENous PRN    glucagon (GLUCAGEN) injection 1 mg  1 mg IntraMUSCular PRN    atenoloL (TENORMIN) tablet 25 mg  25 mg Oral DAILY    [Held by provider] apixaban (ELIQUIS) tablet 2.5 mg  2.5 mg Oral Q12H    albuterol (PROVENTIL VENTOLIN) nebulizer solution 2.5 mg  2.5 mg Nebulization Q4H PRN    insulin lispro (HUMALOG) injection   SubCUTAneous AC&HS    influenza vaccine  (6 mos+)(PF) (FLUARIX/FLULAVAL/FLUZONE QUAD) injection 0.5 mL  1 Each IntraMUSCular PRIOR TO DISCHARGE          Objective:     Vitals:  Blood pressure (!) 145/75, pulse 88, temperature 97.8 °F (36.6 °C), resp. rate 25, height 5' 4\" (1.626 m), weight 73.3 kg (161 lb 9.6 oz), SpO2 98 %, not currently breastfeeding. Temp (24hrs), Av.1 °F (36.7 °C), Min:97.8 °F (36.6 °C), Max:98.4 °F (36.9 °C)      Intake and Output:  No intake/output data recorded. 10/28 1901 - 10/30 0700  In: 487 [P.O.:237; I.V.:250]  Out: 1150 [Urine:1150]    Physical Exam:               GENERAL ASSESSMENT: Elderly F NAD  CHEST: No distress, on NC O2, faint basilar crackles  HEART: S1S2  ABDOMEN: Soft,NT  EXTREMITY: +EDEMA  NEURO: Grossly non focal          ECG/rhythm:    Data Review      No results for input(s): TNIPOC in the last 72 hours. No lab exists for component: ITNL   No results for input(s): CPK, CKMB, TROIQ in the last 72 hours.   Recent Labs     10/30/21  0414 10/29/21  0514 10/28/21  0904   * 133* 134*   K 4.9 4.5 4.9   CL 96* 97 101   CO2 28 28 25   BUN 86* 73* 65*   CREA 2.73* 2.21* 2.43*   * 156* 220*   CA 8.9 9.2 9.1   ALB  --   --  3.1*   WBC 14.7* 16.8* 14.6*   HGB 13.0 13.0 13.5   HCT 40.4 38.8 41.5   PLT 91* 104* 106*      No results for input(s): INR, PTP, APTT, INREXT in the last 72 hours. Needs: urine analysis, urine sodium, protein and creatinine  No results found for: DANTE TAN      : Vern Velasco MD  10/30/2021        [facility]:  [email or web address]  [place] office:  [address] [address]  [place], [address] [place] [address]  Phone: [phone]  Fax :     [phone]    [place] office:  [address] [facility], [address] [place] [address] [address]  Phone - [phone]  Fax - [phone]

## 2021-10-30 NOTE — PROGRESS NOTES
Progress Note      10/30/2021 9:42 AM  NAME: Sylvester Sargent   MRN:  [id number]   Admit Diagnosis: Acute respiratory failure with hypoxia [facility]) [J96.01]     Assessment:     - Acute on chronic systolic chf     - Cath 1169 with mild CAD  - Severe AS s/p TAVR in 2013 with 23mm Chintan, echo 10/2021 EF 35%, mean gradient of 24mm Hg across valve, mild to moderate AI,     mild to moderate MR  - PAF on anticoagulation and rate control  - HTN  - CKD  - Macular degneration  - Arthritis  , god son takes care of her, walker/wheelchair  DNR            Plan:     - Equivicol troponin, manage medically  - Volume overload, diurese  - PAF continue anticoagulation and rate control     - Resume eliquis 2.5mg bid when able  - Cont atenolol 25mg currently rate controlled  - Holding diovan due to increased cr, consider entresto vs. Hydralazine/imdur based on cr  - Cont lasix IV 40mg iv bid, likely discharge on lasix 40mg daily, follow bmp     Home once off of O2       10/ 30    Remains on O2. Nephrology will manage diuretic   No new cardiac issues. Diuresing   Rate is controlled. [x]        High complexity decision making was performed    Subjective:     Sylvester Sargent denies chest pain, dyspnea. Discussed with RN events overnight. Patient Active Problem List   Diagnosis Code    Acute respiratory failure with hypoxia (HCC) J96.01       Review of Systems:    Symptom Y/N Comments  Symptom Y/N Comments   Fever/Chills N   Chest Pain N    Poor Appetite N   Edema N    Cough N   Abdominal Pain N    Sputum N   Joint Pain N    SOB/MATTA N   Pruritis/Rash N    Nausea/vomit N   Tolerating PT/OT Y    Diarrhea N   Tolerating Diet Y    Constipation N   Other       Could NOT obtain due to:      Objective:      Physical Exam:    Last 24hrs VS reviewed since prior progress note.  Most recent are:    Visit Vitals  BP (!) 141/54   Pulse 72   Temp 98 °F (36.7 °C)   Resp 15   Ht 5' 4\" (1.626 m)   Wt 73.3 kg (161 lb 9.6 oz) SpO2 100%   Breastfeeding No   BMI 27.74 kg/m²       Intake/Output Summary (Last 24 hours) at 10/30/2021 0942  Last data filed at 10/30/2021 0352  Gross per 24 hour   Intake 487 ml   Output 1600 ml   Net -1113 ml        General Appearance: Well developed, well nourished, alert & oriented x 3,    no acute distress. Ears/Nose/Mouth/Throat: Hearing grossly normal.  Neck: Supple. Chest: Lungs clear to auscultation bilaterally. Cardiovascular: Regular rate and rhythm, S1S2 normal, no murmur. Abdomen: Soft, non-tender, bowel sounds are active. Extremities: No edema bilaterally. Skin: Warm and dry. PMH/SH reviewed - no change compared to H&P    Data Review    Telemetry: normal sinus rhythm     Lab Data Personally Reviewed:    Recent Labs     10/30/21  0414 10/29/21  0514   WBC 14.7* 16.8*   HGB 13.0 13.0   HCT 40.4 38.8   PLT 91* 104*   LABRCNT(INR:3,PTP:3,APTT:3,)  Recent Labs     10/30/21  0414 10/29/21  0514 10/28/21  0904   * 133* 134*   K 4.9 4.5 4.9   CL 96* 97 101   CO2 28 28 25   BUN 86* 73* 65*   CREA 2.73* 2.21* 2.43*   * 156* 220*   CA 8.9 9.2 9.1   LABRCNT(CPK:3,CpKMB:3,ckndx:3,troiq:3)No results found for: CHOL, CHOLX, CHLST, CHOLV, HDL, HDLP, LDL, LDLC, DLDLP, TGLX, TRIGL, TRIGP, CHHD, CHHDXLABRCNT(sgot:3,gpt:3,ap:3,tbiL:3,TP:3,ALB:3,GLOB:3,ggt:3,aml:3,amyp:3,lpse:3,hlpse:3)No results for input(s): PH, PCO2, PO2 in the last 72 hours. No results found for: CHOL, CHOLX, CHLST, CHOLV, HDL, HDLP, LDL, LDLC, DLDLP, TGLX, TRIGL, TRIGP, CHHD, CHHDXMEDTABLETimothy Emily Pedraza MD  No results for input(s): PH, PCO2, PO2 in the last 72 hours.     Medications Personally Reviewed:    Current Facility-Administered Medications   Medication Dose Route Frequency    prochlorperazine (COMPAZINE) with saline injection 5 mg  5 mg IntraVENous Q6H PRN    methocarbamoL (ROBAXIN) tablet 750 mg  750 mg Oral TID PRN    oxyCODONE IR (ROXICODONE) tablet 5 mg  5 mg Oral Q4H PRN    furosemide (LASIX) injection 40 mg  40 mg IntraVENous BID    azithromycin (ZITHROMAX) 500 mg in 0.9% sodium chloride 250 mL (VIAL-MATE)  500 mg IntraVENous Q24H    budesonide (PULMICORT) 250 mcg/2ml nebulizer susp  250 mcg Nebulization BID RT    And    arformoteroL (BROVANA) neb solution 15 mcg  15 mcg Nebulization BID RT    morphine injection 1 mg  1 mg IntraVENous Q4H PRN    melatonin tablet 6 mg  6 mg Oral QHS PRN    metoprolol (LOPRESSOR) injection 5 mg  5 mg IntraVENous Q6H PRN    lidocaine 4 % patch 1 Patch  1 Patch TransDERmal Q24H    sodium chloride (NS) flush 5-40 mL  5-40 mL IntraVENous Q8H    sodium chloride (NS) flush 5-40 mL  5-40 mL IntraVENous PRN    acetaminophen (TYLENOL) tablet 650 mg  650 mg Oral Q6H PRN    Or    acetaminophen (TYLENOL) suppository 650 mg  650 mg Rectal Q6H PRN    polyethylene glycol (MIRALAX) packet 17 g  17 g Oral DAILY PRN    ondansetron (ZOFRAN) injection 4 mg  4 mg IntraVENous Q6H PRN    pantoprazole (PROTONIX) tablet 40 mg  40 mg Oral DAILY    montelukast (SINGULAIR) tablet 10 mg  10 mg Oral QHS    [Held by provider] polyethylene glycol (MIRALAX) packet 17 g  17 g Oral DAILY    rOPINIRole (REQUIP) tablet 3 mg  3 mg Oral TID    glucose chewable tablet 16 g  4 Tablet Oral PRN    dextrose (D50W) injection syrg 12.5-25 g  12.5-25 g IntraVENous PRN    glucagon (GLUCAGEN) injection 1 mg  1 mg IntraMUSCular PRN    atenoloL (TENORMIN) tablet 25 mg  25 mg Oral DAILY    [Held by provider] apixaban (ELIQUIS) tablet 2.5 mg  2.5 mg Oral Q12H    albuterol (PROVENTIL VENTOLIN) nebulizer solution 2.5 mg  2.5 mg Nebulization Q4H PRN    insulin lispro (HUMALOG) injection   SubCUTAneous AC&HS    influenza vaccine 2021-22 (6 mos+)(PF) (FLUARIX/FLULAVAL/FLUZONE QUAD) injection 0.5 mL  1 Each IntraMUSCular PRIOR TO DISCHARGE         Kurt Tom MD

## 2021-10-31 LAB
ANION GAP SERPL CALC-SCNC: 6 MMOL/L (ref 5–15)
BASOPHILS # BLD: 0 K/UL (ref 0–0.1)
BASOPHILS NFR BLD: 0 % (ref 0–1)
BUN SERPL-MCNC: 88 MG/DL (ref 6–20)
BUN/CREAT SERPL: 37 (ref 12–20)
CALCIUM SERPL-MCNC: 8.9 MG/DL (ref 8.5–10.1)
CHLORIDE SERPL-SCNC: 95 MMOL/L (ref 97–108)
CO2 SERPL-SCNC: 33 MMOL/L (ref 21–32)
CREAT SERPL-MCNC: 2.36 MG/DL (ref 0.55–1.02)
DIFFERENTIAL METHOD BLD: ABNORMAL
EOSINOPHIL # BLD: 0.1 K/UL (ref 0–0.4)
EOSINOPHIL NFR BLD: 1 % (ref 0–7)
ERYTHROCYTE [DISTWIDTH] IN BLOOD BY AUTOMATED COUNT: 12.3 % (ref 11.5–14.5)
GLUCOSE BLD STRIP.AUTO-MCNC: 117 MG/DL (ref 65–117)
GLUCOSE BLD STRIP.AUTO-MCNC: 148 MG/DL (ref 65–117)
GLUCOSE BLD STRIP.AUTO-MCNC: 237 MG/DL (ref 65–117)
GLUCOSE BLD STRIP.AUTO-MCNC: 242 MG/DL (ref 65–117)
GLUCOSE SERPL-MCNC: 121 MG/DL (ref 65–100)
HCT VFR BLD AUTO: 38.7 % (ref 35–47)
HGB BLD-MCNC: 12.8 G/DL (ref 11.5–16)
IMM GRANULOCYTES # BLD AUTO: 0.1 K/UL (ref 0–0.04)
IMM GRANULOCYTES NFR BLD AUTO: 1 % (ref 0–0.5)
LYMPHOCYTES # BLD: 1.3 K/UL (ref 0.8–3.5)
LYMPHOCYTES NFR BLD: 12 % (ref 12–49)
MCH RBC QN AUTO: 31.4 PG (ref 26–34)
MCHC RBC AUTO-ENTMCNC: 33.1 G/DL (ref 30–36.5)
MCV RBC AUTO: 95.1 FL (ref 80–99)
MONOCYTES # BLD: 1 K/UL (ref 0–1)
MONOCYTES NFR BLD: 9 % (ref 5–13)
NEUTS SEG # BLD: 8.1 K/UL (ref 1.8–8)
NEUTS SEG NFR BLD: 77 % (ref 32–75)
NRBC # BLD: 0 K/UL (ref 0–0.01)
NRBC BLD-RTO: 0 PER 100 WBC
PLATELET # BLD AUTO: 85 K/UL (ref 150–400)
PMV BLD AUTO: 12.4 FL (ref 8.9–12.9)
POTASSIUM SERPL-SCNC: 4.1 MMOL/L (ref 3.5–5.1)
RBC # BLD AUTO: 4.07 M/UL (ref 3.8–5.2)
RBC MORPH BLD: ABNORMAL
SERVICE CMNT-IMP: ABNORMAL
SERVICE CMNT-IMP: NORMAL
SODIUM SERPL-SCNC: 134 MMOL/L (ref 136–145)
WBC # BLD AUTO: 10.6 K/UL (ref 3.6–11)

## 2021-10-31 PROCEDURE — P9047 ALBUMIN (HUMAN), 25%, 50ML: HCPCS | Performed by: NURSE PRACTITIONER

## 2021-10-31 PROCEDURE — 74011250636 HC RX REV CODE- 250/636: Performed by: NURSE PRACTITIONER

## 2021-10-31 PROCEDURE — 77010033678 HC OXYGEN DAILY

## 2021-10-31 PROCEDURE — 85025 COMPLETE CBC W/AUTO DIFF WBC: CPT

## 2021-10-31 PROCEDURE — 77030038269 HC DRN EXT URIN PURWCK BARD -A

## 2021-10-31 PROCEDURE — 65660000000 HC RM CCU STEPDOWN

## 2021-10-31 PROCEDURE — 36415 COLL VENOUS BLD VENIPUNCTURE: CPT

## 2021-10-31 PROCEDURE — 94640 AIRWAY INHALATION TREATMENT: CPT

## 2021-10-31 PROCEDURE — 74011636637 HC RX REV CODE- 636/637: Performed by: NURSE PRACTITIONER

## 2021-10-31 PROCEDURE — 74011250636 HC RX REV CODE- 250/636: Performed by: INTERNAL MEDICINE

## 2021-10-31 PROCEDURE — 80048 BASIC METABOLIC PNL TOTAL CA: CPT

## 2021-10-31 PROCEDURE — 74011000250 HC RX REV CODE- 250: Performed by: INTERNAL MEDICINE

## 2021-10-31 PROCEDURE — 74011250637 HC RX REV CODE- 250/637: Performed by: NURSE PRACTITIONER

## 2021-10-31 PROCEDURE — 2709999900 HC NON-CHARGEABLE SUPPLY

## 2021-10-31 PROCEDURE — 94762 N-INVAS EAR/PLS OXIMTRY CONT: CPT

## 2021-10-31 PROCEDURE — 82962 GLUCOSE BLOOD TEST: CPT

## 2021-10-31 RX ORDER — ALBUMIN HUMAN 250 G/1000ML
25 SOLUTION INTRAVENOUS ONCE
Status: COMPLETED | OUTPATIENT
Start: 2021-10-31 | End: 2021-10-31

## 2021-10-31 RX ORDER — DOCUSATE SODIUM 100 MG/1
100 CAPSULE, LIQUID FILLED ORAL
Status: DISCONTINUED | OUTPATIENT
Start: 2021-10-31 | End: 2021-11-06 | Stop reason: HOSPADM

## 2021-10-31 RX ORDER — ALBUMIN HUMAN 250 G/1000ML
12.5 SOLUTION INTRAVENOUS ONCE
Status: DISPENSED | OUTPATIENT
Start: 2021-10-31 | End: 2021-10-31

## 2021-10-31 RX ORDER — ALBUMIN HUMAN 250 G/1000ML
25 SOLUTION INTRAVENOUS ONCE
Status: DISCONTINUED | OUTPATIENT
Start: 2021-10-31 | End: 2021-10-31

## 2021-10-31 RX ADMIN — ALBUMIN (HUMAN) 25 G: 0.25 INJECTION, SOLUTION INTRAVENOUS at 22:09

## 2021-10-31 RX ADMIN — APIXABAN 2.5 MG: 2.5 TABLET, FILM COATED ORAL at 21:50

## 2021-10-31 RX ADMIN — BUDESONIDE 250 MCG: 0.25 INHALANT RESPIRATORY (INHALATION) at 08:48

## 2021-10-31 RX ADMIN — ALBUMIN (HUMAN) 25 G: 0.25 INJECTION, SOLUTION INTRAVENOUS at 19:27

## 2021-10-31 RX ADMIN — AZITHROMYCIN MONOHYDRATE 500 MG: 500 INJECTION, POWDER, LYOPHILIZED, FOR SOLUTION INTRAVENOUS at 14:00

## 2021-10-31 RX ADMIN — SODIUM CHLORIDE 10 ML: 9 INJECTION, SOLUTION INTRAMUSCULAR; INTRAVENOUS; SUBCUTANEOUS at 05:53

## 2021-10-31 RX ADMIN — ROPINIROLE HYDROCHLORIDE 3 MG: 1 TABLET, FILM COATED ORAL at 19:16

## 2021-10-31 RX ADMIN — INSULIN LISPRO 2 UNITS: 100 INJECTION, SOLUTION INTRAVENOUS; SUBCUTANEOUS at 21:49

## 2021-10-31 RX ADMIN — INSULIN LISPRO 3 UNITS: 100 INJECTION, SOLUTION INTRAVENOUS; SUBCUTANEOUS at 18:12

## 2021-10-31 RX ADMIN — BUDESONIDE 250 MCG: 0.25 INHALANT RESPIRATORY (INHALATION) at 20:37

## 2021-10-31 RX ADMIN — SODIUM CHLORIDE 10 ML: 9 INJECTION, SOLUTION INTRAMUSCULAR; INTRAVENOUS; SUBCUTANEOUS at 21:50

## 2021-10-31 RX ADMIN — SODIUM CHLORIDE 10 ML: 9 INJECTION, SOLUTION INTRAMUSCULAR; INTRAVENOUS; SUBCUTANEOUS at 18:13

## 2021-10-31 RX ADMIN — PANTOPRAZOLE SODIUM 40 MG: 40 TABLET, DELAYED RELEASE ORAL at 09:14

## 2021-10-31 RX ADMIN — MONTELUKAST 10 MG: 10 TABLET, FILM COATED ORAL at 21:50

## 2021-10-31 RX ADMIN — APIXABAN 2.5 MG: 2.5 TABLET, FILM COATED ORAL at 09:14

## 2021-10-31 RX ADMIN — ARFORMOTEROL TARTRATE 15 MCG: 15 SOLUTION RESPIRATORY (INHALATION) at 20:37

## 2021-10-31 RX ADMIN — ARFORMOTEROL TARTRATE 15 MCG: 15 SOLUTION RESPIRATORY (INHALATION) at 08:48

## 2021-10-31 RX ADMIN — INSULIN LISPRO 4 UNITS: 100 INJECTION, SOLUTION INTRAVENOUS; SUBCUTANEOUS at 12:40

## 2021-10-31 NOTE — PROGRESS NOTES
Progress Note      10/31/2021 9:42 AM  NAME: Jose Alberto Mcdonough   MRN:  [id number]   Admit Diagnosis: Acute respiratory failure with hypoxia [facility]) [J96.01]     Assessment:     - Acute on chronic systolic chf     - Cath 3535 with mild CAD  - Severe AS s/p TAVR in 2013 with 23mm Chintan, echo 10/2021 EF 35%, mean gradient of 24mm Hg across valve, mild to moderate AI,     mild to moderate MR  - PAF on anticoagulation and rate control  - HTN  - CKD  - Macular degneration  - Arthritis  , god son takes care of her, walker/wheelchair  DNR            Plan:     - Equivicol troponin, manage medically  - Volume overload, diurese  - PAF continue anticoagulation and rate control     - Resume eliquis 2.5mg bid when able  - Cont atenolol 25mg currently rate controlled  - Holding diovan due to increased cr, consider entresto vs. Hydralazine/imdur based on cr  - Cont lasix IV 40mg iv bid, likely discharge on lasix 40mg daily, follow bmp     Home once off of O2       10/ 30    Remains on O2. Nephrology will manage diuretic   No new cardiac issues. Diuresing   Rate is controlled. 10/31  Cardiac status stable. No changes         [x]        High complexity decision making was performed    Subjective:     Jose Alberto Mcdonough denies chest pain, dyspnea. Discussed with RN events overnight. Patient Active Problem List   Diagnosis Code    Acute respiratory failure with hypoxia (HCC) J96.01       Review of Systems:    Symptom Y/N Comments  Symptom Y/N Comments   Fever/Chills N   Chest Pain N    Poor Appetite N   Edema N    Cough N   Abdominal Pain N    Sputum N   Joint Pain N    SOB/MATTA N   Pruritis/Rash N    Nausea/vomit N   Tolerating PT/OT Y    Diarrhea N   Tolerating Diet Y    Constipation N   Other       Could NOT obtain due to:      Objective:      Physical Exam:    Last 24hrs VS reviewed since prior progress note.  Most recent are:    Visit Vitals  BP (!) 120/40   Pulse 87   Temp 98.6 °F (37 °C)   Resp 20   Ht 5' 4\" (1.626 m)   Wt 73.3 kg (161 lb 9.6 oz)   SpO2 99%   Breastfeeding No   BMI 27.74 kg/m²       Intake/Output Summary (Last 24 hours) at 10/31/2021 1008  Last data filed at 10/31/2021 0600  Gross per 24 hour   Intake 450 ml   Output 1500 ml   Net -1050 ml        General Appearance: Well developed, well nourished, alert & oriented x 3,    no acute distress. Ears/Nose/Mouth/Throat: Hearing grossly normal.  Neck: Supple. Chest: Lungs clear to auscultation bilaterally. Cardiovascular: Regular rate and rhythm, S1S2 normal, no murmur. Abdomen: Soft, non-tender, bowel sounds are active. Extremities: No edema bilaterally. Skin: Warm and dry. PMH/SH reviewed - no change compared to H&P    Data Review    Telemetry: normal sinus rhythm     Lab Data Personally Reviewed:    Recent Labs     10/31/21  0515 10/30/21  0414   WBC 10.6 14.7*   HGB 12.8 13.0   HCT 38.7 40.4   PLT 85* 91*   LABRCNT(INR:3,PTP:3,APTT:3,)  Recent Labs     10/31/21  0515 10/30/21  0414 10/29/21  0514   * 132* 133*   K 4.1 4.9 4.5   CL 95* 96* 97   CO2 33* 28 28   BUN 88* 86* 73*   CREA 2.36* 2.73* 2.21*   * 158* 156*   CA 8.9 8.9 9.2   LABRCNT(CPK:3,CpKMB:3,ckndx:3,troiq:3)No results found for: CHOL, CHOLX, CHLST, CHOLV, HDL, HDLP, LDL, LDLC, DLDLP, TGLX, TRIGL, TRIGP, CHHD, CHHDXLABRCNT(sgot:3,gpt:3,ap:3,tbiL:3,TP:3,ALB:3,GLOB:3,ggt:3,aml:3,amyp:3,lpse:3,hlpse:3)No results for input(s): PH, PCO2, PO2 in the last 72 hours. No results found for: CHOL, CHOLX, CHLST, CHOLV, HDL, HDLP, LDL, LDLC, DLDLP, TGLX, TRIGL, TRIGP, CHHD, CHHDXMEDTABLETimlorrie Nair MD  No results for input(s): PH, PCO2, PO2 in the last 72 hours.     Medications Personally Reviewed:    Current Facility-Administered Medications   Medication Dose Route Frequency    albumin human 25% (BUMINATE) solution 12.5 g  12.5 g IntraVENous ONCE    docusate sodium (COLACE) capsule 100 mg  100 mg Oral DAILY PRN    prochlorperazine (COMPAZINE) with saline injection 5 mg  5 mg IntraVENous Q6H PRN    methocarbamoL (ROBAXIN) tablet 750 mg  750 mg Oral TID PRN    oxyCODONE IR (ROXICODONE) tablet 5 mg  5 mg Oral Q4H PRN    [Held by provider] furosemide (LASIX) injection 40 mg  40 mg IntraVENous BID    azithromycin (ZITHROMAX) 500 mg in 0.9% sodium chloride 250 mL (VIAL-MATE)  500 mg IntraVENous Q24H    budesonide (PULMICORT) 250 mcg/2ml nebulizer susp  250 mcg Nebulization BID RT    And    arformoteroL (BROVANA) neb solution 15 mcg  15 mcg Nebulization BID RT    morphine injection 1 mg  1 mg IntraVENous Q4H PRN    melatonin tablet 6 mg  6 mg Oral QHS PRN    metoprolol (LOPRESSOR) injection 5 mg  5 mg IntraVENous Q6H PRN    lidocaine 4 % patch 1 Patch  1 Patch TransDERmal Q24H    sodium chloride (NS) flush 5-40 mL  5-40 mL IntraVENous Q8H    sodium chloride (NS) flush 5-40 mL  5-40 mL IntraVENous PRN    acetaminophen (TYLENOL) tablet 650 mg  650 mg Oral Q6H PRN    Or    acetaminophen (TYLENOL) suppository 650 mg  650 mg Rectal Q6H PRN    polyethylene glycol (MIRALAX) packet 17 g  17 g Oral DAILY PRN    ondansetron (ZOFRAN) injection 4 mg  4 mg IntraVENous Q6H PRN    pantoprazole (PROTONIX) tablet 40 mg  40 mg Oral DAILY    montelukast (SINGULAIR) tablet 10 mg  10 mg Oral QHS    [Held by provider] polyethylene glycol (MIRALAX) packet 17 g  17 g Oral DAILY    rOPINIRole (REQUIP) tablet 3 mg  3 mg Oral TID    glucose chewable tablet 16 g  4 Tablet Oral PRN    dextrose (D50W) injection syrg 12.5-25 g  12.5-25 g IntraVENous PRN    glucagon (GLUCAGEN) injection 1 mg  1 mg IntraMUSCular PRN    atenoloL (TENORMIN) tablet 25 mg  25 mg Oral DAILY    apixaban (ELIQUIS) tablet 2.5 mg  2.5 mg Oral Q12H    albuterol (PROVENTIL VENTOLIN) nebulizer solution 2.5 mg  2.5 mg Nebulization Q4H PRN    insulin lispro (HUMALOG) injection   SubCUTAneous AC&HS    influenza vaccine 2021-22 (6 mos+)(PF) (FLUARIX/FLULAVAL/FLUZONE QUAD) injection 0.5 mL  1 Each IntraMUSCular PRIOR TO Va Graff MD

## 2021-10-31 NOTE — PROGRESS NOTES
Hospitalist Progress Note    NAME: Angela Wolff   :  3/7/1927   MRN:  [id number]       Assessment / Plan:  Acute hypoxic respiratory failure, POA Likely secondary to   Acute congestive heart failure, POA  Cardiogenic pulmonary edema, POA  SIRS (tachycardia and leukocytosis) with no obvious source of infection    Was admitted initially to ICU and transferred to floor same day  Will hold lasix today, soft bp last night. ECHO show EF 35-40, elevated BNP, CXR show pulmonary edema and bilateral effusion  Daily weights, I and O  Steroids stopped as this is not COPD exacerbation  C/w nebulizers  UA negative, CXR doesn't show consolidation  Procal .27, lactate normal  C/w emperic azithromycin for now     A. fib with RVR  History of paroxysmal A. fib  Will resume eliquis, cleared by nephrology  On atenolol   Controlled today     ROCÍO on CKD stage III  Creatinine slilghtly better today  Likely cardiorenal syndrome + Left hydronephrosis, Urology following  Bladder scan didn't show significant retention  C/w iv diuretics  CT abdomen showing left hydronephrosis  Nephrotoxic agents if possible,continue IV diuresis    Low back pain:  Complains of left low back pain  CT show: 1. Severe progressive left hydronephrosis with neoplasia not excluded. 2. Lumbar degenerative disc disease as detailed. No fracture. Could be d/t hydronephrosis, pain control  CT also showed Hiatal hernia with non obstructive volvulus, however not having any symptoms from this today currently.  She is aware that she has a large hiatal hernia     25.0 - 29.9 Overweight / Body mass index is 29.2 kg/m².     Estimated discharge date:   Barriers: Clinical improvement  Had a detail discussion with her this AM, she doesn't want anything aggressive if she fails to improve, will get palliative care on board.     Code status: DNR  Prophylaxis: Eliquis   Recommended Disposition: Home w/Family     Subjective:     Chief Complaint / Reason for Physician Visit  Follow up for ARF,/Afib RVR  She feels better today    Review of Systems:  Symptom Y/N Comments  Symptom Y/N Comments   Fever/Chills n   Chest Pain n    Poor Appetite n   Edema     Cough    Abdominal Pain     Sputum    Joint Pain     SOB/MATTA    Pruritis/Rash     Nausea/vomit    Tolerating PT/OT     Diarrhea    Tolerating Diet     Constipation    Other       Could NOT obtain due to:      Objective:     VITALS:   Last 24hrs VS reviewed since prior progress note. Most recent are:  Patient Vitals for the past 24 hrs:   Temp Pulse Resp BP SpO2   10/31/21 1121 98.7 °F (37.1 °C) 75 20 (!) 100/38 98 %   10/31/21 0850     99 %   10/31/21 0802 98.6 °F (37 °C) 87 20 (!) 120/40 98 %   10/31/21 0800  71  (!) 89/31 98 %   10/31/21 0700  74  (!) 104/53 98 %   10/31/21 0600  80  (!) 120/43 97 %   10/31/21 0500  79  (!) 145/45 99 %   10/31/21 0430  72  (!) 99/50 95 %   10/31/21 0321 98.3 °F (36.8 °C) 80 20 (!) 103/42 95 %   10/31/21 0200  80  (!) 116/54 98 %   10/30/21 2320 98 °F (36.7 °C) 83 18 (!) 107/45 98 %   10/30/21 1951     95 %   10/30/21 1948 99.4 °F (37.4 °C) 73 20 (!) 103/43 97 %   10/30/21 1607 99.2 °F (37.3 °C) 83 24 (!) 116/44 97 %       Intake/Output Summary (Last 24 hours) at 10/31/2021 1216  Last data filed at 10/31/2021 0600  Gross per 24 hour   Intake 450 ml   Output 1500 ml   Net -1050 ml        I had a face to face encounter and independently examined this patient on 10/31/2021, as outlined below:  PHYSICAL EXAM:  General: WD, WN. Alert, cooperative, no acute distress    EENT:  EOMI. Anicteric sclerae. MMM  Resp:  B/l crackles  CV:  Regular  rhythm,  Edema b/l LE L>R  GI:  Soft, Non distended, Non tender. +Bowel sounds  Neurologic:  Alert and oriented X 3, normal speech,   Psych:   Good insight. Not anxious nor agitated  Skin:  No rashes.   No jaundice    Reviewed most current lab test results and cultures  YES  Reviewed most current radiology test results   YES  Review and summation of old records today    NO  Reviewed patient's current orders and MAR    YES  PMH/SH reviewed - no change compared to H&P  ________________________________________________________________________  Care Plan discussed with:    Comments   Patient y    Family      RN y    Care Manager     Consultant                        Multidiciplinary team rounds were held today with , nursing, pharmacist and clinical coordinator. Patient's plan of care was discussed; medications were reviewed and discharge planning was addressed. ________________________________________________________________________  Total NON critical care TIME: 35   Minutes    Total CRITICAL CARE TIME Spent:   Minutes non procedure based      Comments   >50% of visit spent in counseling and coordination of care     ________________________________________________________________________  Soy Barrios MD     Procedures: see electronic medical records for all procedures/Xrays and details which were not copied into this note but were reviewed prior to creation of Plan. LABS:  I reviewed today's most current labs and imaging studies.   Pertinent labs include:  Recent Labs     10/31/21  0515 10/30/21  0414 10/29/21  0514   WBC 10.6 14.7* 16.8*   HGB 12.8 13.0 13.0   HCT 38.7 40.4 38.8   PLT 85* 91* 104*     Recent Labs     10/31/21  0515 10/30/21  0414 10/29/21  0514   * 132* 133*   K 4.1 4.9 4.5   CL 95* 96* 97   CO2 33* 28 28   * 158* 156*   BUN 88* 86* 73*   CREA 2.36* 2.73* 2.21*   CA 8.9 8.9 9.2       Signed: Soy Barrios MD

## 2021-10-31 NOTE — PROGRESS NOTES
ADULT PROTOCOL: JET AEROSOL ASSESSMENT    Patient  Marcelo Cook     80 y.o.   female     10/31/2021  2:56 AM    Breath Sounds Pre Procedure: Right Breath Sounds: Diminished                               Left Breath Sounds: Diminished    Breath Sounds Post Procedure: Right Breath Sounds: Diminished                                 Left Breath Sounds: Diminished    Breathing pattern: Pre procedure Breathing Pattern: Regular          Post procedure Breathing Pattern: Regular    Heart Rate: Pre procedure Pulse: 79           Post procedure Pulse: 79    Resp Rate: Pre procedure Respirations: 15           Post procedure Respirations: 15      Oxygen: O2 Device: Nasal cannula   FiO2 (%) 2l nc     Changed: NO    SpO2: Pre procedure SpO2: 95 %   with oxygen              Post procedure SpO2: 95 %  with oxygen    Nebulizer Therapy: Current medications Aerosolized Medications: Brovana, Pulmicort      Changed: NO    }    Problem List:   Patient Active Problem List   Diagnosis Code    [facility]       Respiratory Therapist: Nasir Barron, RT

## 2021-10-31 NOTE — PROGRESS NOTES
Received notification from bedside RN about patient with regards to: persistent hypotension  VS: BP 99/50, HR 72, RR 20, O2 sat 95% on NC 3 L    Intervention given: 25% Albumin 12.5 g IV x 1 dose ordered

## 2021-10-31 NOTE — PROGRESS NOTES
1900 Bedside and Verbal shift change report given to Shasha Pedroza RN and Soumya Valdez RN (oncoming nurse) by Philip Ybarra (offgoing nurse). Report included the following information SBAR, Kardex, ED Summary, Procedure Summary, MAR, Recent Results and Cardiac Rhythm atrial fib with BBB.    2320 Pt bp 107/45, MAP 59, provider Tanvi Hutton aware and calculated MAP at 69. No orders placed at this time. 0500 Albumin held due to patient bp 145/45 MAP of 78.     0700 Bedside shift change report given to Philip Ybarra (oncoming nurse) by Shasha Pedroza RN (offgoing nurse). Report included the following information SBAR, Kardex, MAR, Recent Results and Cardiac Rhythm A fib    Shift worked:  [id number]     Shift summary and any significant changes:     hypotensive episodes, albumin held due to MAP of 78     Concerns for physician to address:  hypotensive episodes     Zone phone for oncoming shift:          Activity:  Activity Level: Bed Rest  Number times ambulated in hallways past shift: 0  Number of times OOB to chair past shift: 0    Cardiac:   Cardiac Monitoring: Yes      Cardiac Rhythm: Atrial Fib    Access:   Current line(s): PIV     Genitourinary:   Urinary status: external catheter    Respiratory:   O2 Device: Nasal cannula  Chronic home O2 use?: NO  Incentive spirometer at bedside: YES     GI:  Last Bowel Movement Date: 10/26/21  Current diet:  ADULT DIET Regular; 3 carb choices (45 gm/meal); No Salt Added (3-4 gm)  ADULT ORAL NUTRITION SUPPLEMENT Breakfast, Lunch, Dinner; Other Supplement; Ensure  Passing flatus: YES  Tolerating current diet: NO       Pain Management:   Patient states pain is manageable on current regimen: YES    Skin:  Malik Score: 15  Interventions: increase time out of bed, internal/external urinary devices and nutritional support     Patient Safety:  Fall Score:  Total Score: 3  Interventions: bed/chair alarm, gripper socks and pt to call before getting OOB  High Fall Risk: Yes    Length of Stay:  Expected LOS: 4d 19h  Actual LOS: 5      Nik Choudhury RN

## 2021-10-31 NOTE — PROGRESS NOTES
Spoke to rapid response RN while waiting for MD to respond. Arline Joseph rapid RN states we could use a 250 cc bolus if B/P remains down. Pt alert oriented and menta ting. . pt also was on  Requip, it can cause hypotension so I held it. Family agrees has no RLS at moment. Called rapid response when B/P dropped to 60's and NP showed up as well to assess pt.

## 2021-10-31 NOTE — PROGRESS NOTES
Pain in lower back. B/P soft thru night.   platelets trending down 88 this am  Message sent to provider

## 2021-11-01 LAB
ANION GAP SERPL CALC-SCNC: 6 MMOL/L (ref 5–15)
BUN SERPL-MCNC: 88 MG/DL (ref 6–20)
BUN/CREAT SERPL: 48 (ref 12–20)
CALCIUM SERPL-MCNC: 8.7 MG/DL (ref 8.5–10.1)
CHLORIDE SERPL-SCNC: 96 MMOL/L (ref 97–108)
CO2 SERPL-SCNC: 34 MMOL/L (ref 21–32)
CREAT SERPL-MCNC: 1.82 MG/DL (ref 0.55–1.02)
GLUCOSE BLD STRIP.AUTO-MCNC: 133 MG/DL (ref 65–117)
GLUCOSE BLD STRIP.AUTO-MCNC: 153 MG/DL (ref 65–117)
GLUCOSE BLD STRIP.AUTO-MCNC: 181 MG/DL (ref 65–117)
GLUCOSE BLD STRIP.AUTO-MCNC: 232 MG/DL (ref 65–117)
GLUCOSE SERPL-MCNC: 121 MG/DL (ref 65–100)
POTASSIUM SERPL-SCNC: 3.8 MMOL/L (ref 3.5–5.1)
SERVICE CMNT-IMP: ABNORMAL
SODIUM SERPL-SCNC: 136 MMOL/L (ref 136–145)

## 2021-11-01 PROCEDURE — 65660000000 HC RM CCU STEPDOWN

## 2021-11-01 PROCEDURE — 94640 AIRWAY INHALATION TREATMENT: CPT

## 2021-11-01 PROCEDURE — 80048 BASIC METABOLIC PNL TOTAL CA: CPT

## 2021-11-01 PROCEDURE — 36415 COLL VENOUS BLD VENIPUNCTURE: CPT

## 2021-11-01 PROCEDURE — 74011000250 HC RX REV CODE- 250: Performed by: INTERNAL MEDICINE

## 2021-11-01 PROCEDURE — 82962 GLUCOSE BLOOD TEST: CPT

## 2021-11-01 PROCEDURE — 74011000250 HC RX REV CODE- 250: Performed by: NURSE PRACTITIONER

## 2021-11-01 PROCEDURE — 74011636637 HC RX REV CODE- 636/637: Performed by: NURSE PRACTITIONER

## 2021-11-01 PROCEDURE — 74011250637 HC RX REV CODE- 250/637: Performed by: NURSE PRACTITIONER

## 2021-11-01 RX ORDER — FUROSEMIDE 40 MG/1
40 TABLET ORAL DAILY
Status: DISCONTINUED | OUTPATIENT
Start: 2021-11-02 | End: 2021-11-06 | Stop reason: HOSPADM

## 2021-11-01 RX ADMIN — INSULIN LISPRO 4 UNITS: 100 INJECTION, SOLUTION INTRAVENOUS; SUBCUTANEOUS at 12:47

## 2021-11-01 RX ADMIN — SODIUM CHLORIDE 10 ML: 9 INJECTION, SOLUTION INTRAMUSCULAR; INTRAVENOUS; SUBCUTANEOUS at 05:37

## 2021-11-01 RX ADMIN — SODIUM CHLORIDE 10 ML: 9 INJECTION, SOLUTION INTRAMUSCULAR; INTRAVENOUS; SUBCUTANEOUS at 05:36

## 2021-11-01 RX ADMIN — APIXABAN 2.5 MG: 2.5 TABLET, FILM COATED ORAL at 22:07

## 2021-11-01 RX ADMIN — BUDESONIDE 250 MCG: 0.25 INHALANT RESPIRATORY (INHALATION) at 08:49

## 2021-11-01 RX ADMIN — INSULIN LISPRO 3 UNITS: 100 INJECTION, SOLUTION INTRAVENOUS; SUBCUTANEOUS at 16:56

## 2021-11-01 RX ADMIN — ROPINIROLE HYDROCHLORIDE 3 MG: 1 TABLET, FILM COATED ORAL at 16:56

## 2021-11-01 RX ADMIN — APIXABAN 2.5 MG: 2.5 TABLET, FILM COATED ORAL at 08:19

## 2021-11-01 RX ADMIN — PANTOPRAZOLE SODIUM 40 MG: 40 TABLET, DELAYED RELEASE ORAL at 08:19

## 2021-11-01 RX ADMIN — MONTELUKAST 10 MG: 10 TABLET, FILM COATED ORAL at 22:07

## 2021-11-01 RX ADMIN — ARFORMOTEROL TARTRATE 15 MCG: 15 SOLUTION RESPIRATORY (INHALATION) at 08:49

## 2021-11-01 RX ADMIN — SODIUM CHLORIDE 10 ML: 9 INJECTION, SOLUTION INTRAMUSCULAR; INTRAVENOUS; SUBCUTANEOUS at 12:48

## 2021-11-01 RX ADMIN — ACETAMINOPHEN 650 MG: 325 TABLET ORAL at 15:55

## 2021-11-01 RX ADMIN — SODIUM CHLORIDE 10 ML: 9 INJECTION, SOLUTION INTRAMUSCULAR; INTRAVENOUS; SUBCUTANEOUS at 22:21

## 2021-11-01 RX ADMIN — ROPINIROLE HYDROCHLORIDE 3 MG: 1 TABLET, FILM COATED ORAL at 22:08

## 2021-11-01 NOTE — PROGRESS NOTES
Problem: Risk for Spread of Infection  Goal: Prevent transmission of infectious organism to others  Description: Prevent the transmission of infectious organisms to other patients, staff members, and visitors. Outcome: Progressing Towards Goal     Problem: Falls - Risk of  Goal: *Absence of Falls  Description: Document Yvonne Levin Fall Risk and appropriate interventions in the flowsheet.   Outcome: Progressing Towards Goal  Note: Fall Risk Interventions:  Mobility Interventions: Bed/chair exit alarm, Communicate number of staff needed for ambulation/transfer, Patient to call before getting OOB, Utilize walker, cane, or other assistive device         Medication Interventions: Bed/chair exit alarm, Teach patient to arise slowly, Patient to call before getting OOB    Elimination Interventions: Bed/chair exit alarm, Call light in reach, Toilet paper/wipes in reach, Toileting schedule/hourly rounds

## 2021-11-01 NOTE — PROGRESS NOTES
Rapid Response Team Note    Called by RN at7:43 AM to see patient for severe hypotension stat. Upon entering the room the patient was asymptomatic. Events as described by RN caring for patient noted. Visit Vitals  BP (!) 108/38   Pulse 77   Temp 98.7 °F (37.1 °C)   Resp 20   Ht 5' 4\" (1.626 m)   Wt 73.3 kg (161 lb 9.6 oz)   SpO2 100%   Breastfeeding No   BMI 27.74 kg/m²       Gen: awake, alert, in NAD  HEENT: wnl  Chest: symmetrical  Abd: soft, non tender  Neuro: AAO  Ext: GASTON    Pertinent Recent Labs and Xrays:  Recent Labs     10/31/21  0515 10/30/21  0414   WBC 10.6 14.7*   HGB 12.8 13.0   HCT 38.7 40.4   PLT 85* 91*     Recent Labs     10/31/21  0515 10/30/21  0414   * 132*   K 4.1 4.9   CL 95* 96*   CO2 33* 28   BUN 88* 86*   CREA 2.36* 2.73*   * 158*   CA 8.9 8.9     No results for input(s): INR, INREXT in the last 72 hours. No results for input(s): PH, PCO2, PO2 in the last 72 hours. No results for input(s): PHI, PO2I, PCO2I in the last 72 hours. No results for input(s): CPK, CKNDX, TROIQ in the last 72 hours.     No lab exists for component: CPKMB  Lab Results   Component Value Date/Time    Glucose (POC) 242 (H) 10/31/2021 08:50 PM    Glucose (POC) 148 (H) 10/31/2021 04:31 PM    Glucose (POC) 237 (H) 10/31/2021 11:23 AM    Glucose (POC) 117 10/31/2021 07:29 AM    Glucose (POC) 129 (H) 10/30/2021 08:45 PM       A/P: Severe hypotension, asymptomatic (BP 68/32)  - 25% Albumin 25 g IV x 1 dose  - recheck BP in different extremity (BP 94/41)  - remained asymptomatic, extremities warm with good urine output    Claudene Larry, NP  Critical care time unrelated to prior notes: 25 minutes    2145: MAP dropped in 40's, patient remained asymptomatic    - Ordered additional 25% Albumin 25 g IV x 1 dose ordered  - okay with MAP in upper 50's , monitor for onset of symptoms

## 2021-11-01 NOTE — PROGRESS NOTES
Hospitalist Progress Note    NAME: Griselda Flores   :  3/7/1927   MRN:  [id number]       Assessment / Plan:  Acute hypoxic respiratory failure, POA Likely secondary to   Acute congestive heart failure, POA  Cardiogenic pulmonary edema, POA  SIRS (tachycardia and leukocytosis) with no obvious source of infection    Was admitted initially to ICU and transferred to floor same day  Holding lasix for now d/t soft BP  ECHO show EF 35-40, elevated BNP, CXR show pulmonary edema and bilateral effusion  Daily weights, I and O  Steroids stopped as this is not COPD exacerbation  C/w nebulizers  UA negative, CXR doesn't show consolidation  Procal .27, lactate normal  Azithromycin course completed  Blood pressure remains soft, would avoid fluids, can be albumin if sbp <90     A. fib with RVR  History of paroxysmal A. fib  C/w eliquis  On atenolol   Controlled today     ROCÍO on CKD stage III  Creatinine slilghtly better today  Likely cardiorenal syndrome + Left hydronephrosis, Urology following  Bladder scan didn't show significant retention  Holding diuretics d/t soft bp  CT abdomen showing left hydronephrosis    Low back pain:  Complains of left low back pain  CT show: 1. Severe progressive left hydronephrosis with neoplasia not excluded. 2. Lumbar degenerative disc disease as detailed. No fracture. Could be d/t hydronephrosis, pain control  CT also showed Hiatal hernia with non obstructive volvulus, however not having any symptoms from this today currently.  She is aware that she has a large hiatal hernia     25.0 - 29.9 Overweight / Body mass index is 29.2 kg/m².     Estimated discharge date:   Barriers: Clinical improvement  Pending Palliative consult, would likely be appropriate for home with home hospice.     Code status: DNR  Prophylaxis: Eliquis   Recommended Disposition: Home with home health + hospice     Subjective:     Chief Complaint / Reason for Physician Visit  Follow up for ARF,/Afib RVR  She feels better today  Her blood pressure remains soft    Review of Systems:  Symptom Y/N Comments  Symptom Y/N Comments   Fever/Chills n   Chest Pain n    Poor Appetite n   Edema     Cough    Abdominal Pain     Sputum    Joint Pain     SOB/MATTA    Pruritis/Rash     Nausea/vomit    Tolerating PT/OT     Diarrhea    Tolerating Diet     Constipation    Other       Could NOT obtain due to:      Objective:     VITALS:   Last 24hrs VS reviewed since prior progress note.  Most recent are:  Patient Vitals for the past 24 hrs:   Temp Pulse Resp BP SpO2   11/01/21 1034  98  (!) 100/37 93 %   11/01/21 1030 98.7 °F (37.1 °C) 86 16 (!) 110/44 96 %   11/01/21 0950  100  (!) 107/39 91 %   11/01/21 0930  (!) 106  (!) 115/48 95 %   11/01/21 0915  (!) 113  122/70 96 %   11/01/21 0851     97 %   11/01/21 0815     99 %   11/01/21 0730 99 °F (37.2 °C) 83 19 115/65 99 %   11/01/21 0715  70  (!) 92/31 98 %   11/01/21 0700  87  (!) 101/29 98 %   11/01/21 0645  85  (!) 99/52 99 %   11/01/21 0630  81  (!) 107/42 98 %   11/01/21 0615  86  (!) 131/47 99 %   11/01/21 0600  88  (!) 131/55 99 %   11/01/21 0545  79  116/62 98 %   11/01/21 0530  86  (!) 112/43 99 %   11/01/21 0500  75  (!) 98/31 98 %   11/01/21 0445  87  (!) 107/43 98 %   11/01/21 0430  86  (!) 103/46 99 %   11/01/21 0415  83  (!) 111/46 99 %   11/01/21 0400  92  (!) 108/58 99 %   11/01/21 0345 98.5 °F (36.9 °C) 86 16 (!) 103/46 99 %   11/01/21 0330  85  (!) 115/38 98 %   11/01/21 0315  80  (!) 108/32 99 %   11/01/21 0300  80  (!) 112/51 99 %   11/01/21 0245  77  (!) 115/39 99 %   11/01/21 0230  79  (!) 98/31 99 %   11/01/21 0215  81  (!) 106/29 99 %   11/01/21 0200  80  (!) 108/48 99 %   11/01/21 0145  77  (!) 108/38 100 %   11/01/21 0130  80  (!) 124/33 100 %   11/01/21 0045  80  (!) 90/39 99 %   11/01/21 0015  76  (!) 106/48 99 %   10/31/21 2345  81  (!) 90/44 99 %   10/31/21 2300 98.7 °F (37.1 °C) 80 17 (!) 98/27 97 % 10/31/21 2215  83  (!) 103/27 99 %   10/31/21 2200  82  (!) 104/46 99 %   10/31/21 2145  76  (!) 94/41 98 %   10/31/21 2140    (!) 94/41    10/31/21 2130  78  (!) 80/27 97 %   10/31/21 2115  75  (!) 80/25 98 %   10/31/21 2100  77  (!) 79/38 100 %   10/31/21 2045  79  (!) 91/47 100 %   10/31/21 2037     100 %   10/31/21 2030  78  (!) 90/31 98 %   10/31/21 2015  72  (!) 89/35 99 %   10/31/21 2000  76  (!) 105/27 99 %   10/31/21 1953  74  (!) 94/41 97 %   10/31/21 1952    (!) 94/41    10/31/21 1949  80  (!) 90/43 98 %   10/31/21 1948  80   98 %   10/31/21 1945  66  (!) 68/32 98 %   10/1934 98.7 °F (37.1 °C) 73 20 (!) 77/27 98 %   10/31/21 1933  77   98 %   10/31/21 1918  72  (!) 77/30 97 %   10/31/21 1906  78  (!) 80/31 98 %   10/31/21 1900  75  (!) 77/29 98 %   10/31/21 1800  88  (!) 121/44 95 %   10/31/21 1700  77  (!) 142/63 98 %   10/31/21 1600 97.6 °F (36.4 °C) 76  (!) 131/49 100 %       Intake/Output Summary (Last 24 hours) at 11/1/2021 1159  Last data filed at 10/31/2021 1934  Gross per 24 hour   Intake 700 ml   Output 25 ml   Net 675 ml        I had a face to face encounter and independently examined this patient on 11/1/2021, as outlined below:  PHYSICAL EXAM:  General: WD, WN. Alert, cooperative, no acute distress    EENT:  EOMI. Anicteric sclerae. MMM  Resp:  B/l crackles  CV:  Regular  rhythm,  Edema b/l LE L>R  GI:  Soft, Non distended, Non tender. +Bowel sounds  Neurologic:  Alert and oriented X 3, normal speech,   Psych:   Good insight. Not anxious nor agitated  Skin:  No rashes.   No jaundice    Reviewed most current lab test results and cultures  YES  Reviewed most current radiology test results   YES  Review and summation of old records today    NO  Reviewed patient's current orders and MAR    YES  PMH/SH reviewed - no change compared to H&P  ________________________________________________________________________  Care Plan discussed with:    Comments   Patient y    Family      RN y    Care Manager     Consultant                        Multidiciplinary team rounds were held today with , nursing, pharmacist and clinical coordinator. Patient's plan of care was discussed; medications were reviewed and discharge planning was addressed. ________________________________________________________________________  Total NON critical care TIME: 35   Minutes    Total CRITICAL CARE TIME Spent:   Minutes non procedure based      Comments   >50% of visit spent in counseling and coordination of care     ________________________________________________________________________  June Farrar MD     Procedures: see electronic medical records for all procedures/Xrays and details which were not copied into this note but were reviewed prior to creation of Plan. LABS:  I reviewed today's most current labs and imaging studies.   Pertinent labs include:  Recent Labs     10/31/21  0515 10/30/21  0414   WBC 10.6 14.7*   HGB 12.8 13.0   HCT 38.7 40.4   PLT 85* 91*     Recent Labs     11/01/21  0525 10/31/21  0515 10/30/21  0414    134* 132*   K 3.8 4.1 4.9   CL 96* 95* 96*   CO2 34* 33* 28   * 121* 158*   BUN 88* 88* 86*   CREA 1.82* 2.36* 2.73*   CA 8.7 8.9 8.9       Signed: June Farrar MD

## 2021-11-01 NOTE — PROGRESS NOTES
Progress Note    Patient: Jose Alberto Mcdonough MRN: [id number]  SSN: [id number]    YOB: 1927  Age: 80 y.o. Sex: female        ADMITTED:  10/26/2021 to Elsie Welch MD  for Acute respiratory failure with hypoxia [facility]) [J96.01]         Jose Alberto Mcdonough was admitted for Acute respiratory failure with hypoxia ([address] [J96.01]. Chart reviewed. HD stable. Creat improved. Back pain resolved currently   On Eliquis  Blood cx NGTD      Vitals:  Temp (24hrs), Av.9 °F (37.2 °C), Min:98.5 °F (36.9 °C), Max:100 °F (37.8 °C)     Blood pressure (!) 132/46, pulse 85, temperature 100 °F (37.8 °C), resp. rate 14, height 5' 4\" (1.626 m), weight 73.3 kg (161 lb 9.6 oz), SpO2 96 %, not currently breastfeeding. I&O's:  10/30 1901 - 700  In: 0 [P.O.:350; I.V.:350]  Out: 775 [Urine:775]   701 - 1900  In: 720 [P.O.:720]  Out: 400 [Urine:400]     Exam:   NAD. abdomen soft, NT     Labs:   Recent Labs     10/31/21  0515 10/30/21  0414   WBC 10.6 14.7*   HGB 12.8 13.0   HCT 38.7 40.4   PLT 85* 91*     Recent Labs     21  0525 10/31/21  0515 10/30/21  0414    134* 132*   K 3.8 4.1 4.9   CL 96* 95* 96*   CO2 34* 33* 28   * 121* 158*   BUN 88* 88* 86*   CREA 1.82* 2.36* 2.73*   CA 8.7 8.9 8.9        Cultures:      Imaging:       Assessment:     - Active Problems:    Acute respiratory failure with hypoxia (HCC) (10/26/2021)    chronic left hydronephrosis- suspect UPJ obstruction    ROCÍO-resolving, multifactorial with CKD and HF, hydro    Plan:     - abd pain resolved.  Voiding well and with kidney function improving no urologic intervention   -continue daily labs  -patient likely d/c soon, will arrange follow up       Signed By: Onelia Contreras NP - 2021

## 2021-11-01 NOTE — PROGRESS NOTES
0700: Bedside shift change report given to Darcey Simmonds, RN and Obdulia RN (oncoming nurse) by Rose Brar RN (offgoing nurse). Report included the following information SBAR, Kardex, Intake/Output, MAR and Recent Results. 0915: Dr. Britton Later at bedside assessing patient. Will continue to hold Lasix until tomorrow d/t low BP.     0930: Nephrology at bedside assessing patient. Will continue current treatment. 1030: Attempted to place patient on RA, O2 saturation 87%. Patient placed on 1L NC at this time. O2 saturation 93%. 1118: Discussed patient with Dr. Jordan Mercedes MD made aware of low MAPs. He states he wants patient's SBP to be >90.     1900: End of Shift Note    Bedside shift change report given to oncsanty RN (oncoming nurse) by Vanessa Azul (offgoing nurse). Report included the following information SBAR, Kardex, Intake/Output, MAR and Med Rec Status    Shift worked:  [id number]     Shift summary and any significant changes:     palliative to see patient tomorrow. Keep SBP >90     Concerns for physician to address:  none     [facility]   [facility]       Activity:  Activity Level: Bed Rest  Number times ambulated in hallways past shift: 0  Number of times OOB to chair past shift: 0    Cardiac:   Cardiac Monitoring: Yes      Cardiac Rhythm: Atrial Fib    Access:   Current line(s): PIV     Genitourinary:   Urinary status: external catheter    Respiratory:   O2 Device: Nasal cannula  Chronic home O2 use?: NO  Incentive spirometer at bedside: YES     GI:  Last Bowel Movement Date: 10/26/21  Current diet:  ADULT DIET Regular; 3 carb choices (45 gm/meal); No Salt Added (3-4 gm)  ADULT ORAL NUTRITION SUPPLEMENT Breakfast, Lunch, Dinner; Other Supplement;  Ensure  Passing flatus: YES  Tolerating current diet: YES       Pain Management:   Patient states pain is manageable on current regimen: YES    Skin:  Malik Score: 15  Interventions: turn team, float heels, increase time out of bed and PT/OT consult    Patient Safety:  Fall Score:  Total Score: 3  Interventions: bed/chair alarm, gripper socks and pt to call before getting OOB  High Fall Risk: Yes    Length of Stay:  Expected LOS: 4d 19h  Actual LOS: [address]

## 2021-11-01 NOTE — CONSULTS
Palliative Medicine  [phone]    Received consult, due to census will see patient tomorrow    Víctor Pi, NP

## 2021-11-01 NOTE — PROGRESS NOTES
Spiritual Care Assessment/Progress Note  [facility]      NAME: Lizette Mcgill      MRN: [id number]  AGE: 80 y.o.  SEX: female  Roman Catholic Affiliation: Buddhist   Language: English     11/1/2021     Total Time (in minutes): 20     Spiritual Assessment begun in [facility] PROGRESSIVE CARE through conversation with:         [x]Patient        [] Family    [] Friend(s)        Reason for Consult: Palliative Care, Initial/Spiritual Assessment     Spiritual beliefs: (Please include comment if needed)     [x] Identifies with a raf tradition:         [] Supported by a raf community:            [] Claims no spiritual orientation:           [] Seeking spiritual identity:                [] Adheres to an individual form of spirituality:           [] Not able to assess:                           Identified resources for coping:      [x] Prayer                               [] Music                  [] Guided Imagery     [x] Family/friends                 [] Pet visits     [] Devotional reading                         [] Unknown     [] Other:                                               Interventions offered during this visit: (See comments for more details)    Patient Interventions: Affirmation of raf, Coping skills reviewed/reinforced, Initial/Spiritual assessment, patient floor, Iconic (affirming the presence of God/Higher Power), Prayer (actual), Roman Catholic beliefs/image of God discussed           Plan of Care:     [x] Support spiritual and/or cultural needs    [] Support AMD and/or advance care planning process      [] Support grieving process   [] Coordinate Rites and/or Rituals    [] Coordination with community clergy   [] No spiritual needs identified at this time   [] Detailed Plan of Care below (See Comments)  [] Make referral to Music Therapy  [] Make referral to Pet Therapy     [] Make referral to Addiction services  [] Make referral to [facility]  [] Make referral to Spiritual Care Partner  [] No future visits requested        [] Follow up upon further referrals     Comments: Initial spiritual assessment with Palliative pt in 2268. Pt awake and alert while laying in bed. Pt appeared calm and comfortable.  introduced self and role and proceeded to explore how pt was feeling and coping. Pt expressed relief that her back pain was doing much better. Pleased with care received and self-reported to be coping well. Supported by loved ones. Identifies as [employer] since the age of 6.  affirmed rfa and God as source of support and strength. Offered prayer which pt accepted. Pt thanked  for visit and appears to be in positive spirits today. Advised of availability of chaplains. Pt has attended [employer]. Contact Spiritual Care for any further referrals.   LALITHA MontanoDiv, [address]   Paging Service [phone])

## 2021-11-01 NOTE — PROGRESS NOTES
Problem: Falls - Risk of  Goal: *Absence of Falls  Description: Document Bard  Fall Risk and appropriate interventions in the flowsheet.   Outcome: Progressing Towards Goal  Note: Fall Risk Interventions:  Mobility Interventions: Bed/chair exit alarm, Communicate number of staff needed for ambulation/transfer, Patient to call before getting OOB, Utilize walker, cane, or other assistive device         Medication Interventions: Bed/chair exit alarm, Teach patient to arise slowly, Patient to call before getting OOB    Elimination Interventions: Bed/chair exit alarm, Call light in reach, Toilet paper/wipes in reach, Toileting schedule/hourly rounds

## 2021-11-01 NOTE — PROGRESS NOTES
Aquilino Davalos  YOB: 1927          Assessment & Plan:   ROCÍO on CKD, multifactorial  CKD 3b, baseline Cr 1.5  Severe L hydro, UPJ obstruction ?cause  CHF  Af with RVR  HTN    Rec:  Cr trending down   IV lasix on hold due to low Bp , plan to switch to PO tomorrow   Continue current antihypertensives  Urology following regarding hydro- no plan for intervention        Subjective:   CC: f/u ROCÍO  HPI: Creat a better  today. CT shows severe L hydro with UPJ obstruction of uncertain cause. Fluid balance has been  Negative. Lasix to be restarted PO tomorrow.  As per nurse pt appears  beter and she mentions she is doing better    ROS: Denies sob/n/v  Current Facility-Administered Medications   Medication Dose Route Frequency    docusate sodium (COLACE) capsule 100 mg  100 mg Oral DAILY PRN    prochlorperazine (COMPAZINE) with saline injection 5 mg  5 mg IntraVENous Q6H PRN    methocarbamoL (ROBAXIN) tablet 750 mg  750 mg Oral TID PRN    oxyCODONE IR (ROXICODONE) tablet 5 mg  5 mg Oral Q4H PRN    budesonide (PULMICORT) 250 mcg/2ml nebulizer susp  250 mcg Nebulization BID RT    And    arformoteroL (BROVANA) neb solution 15 mcg  15 mcg Nebulization BID RT    morphine injection 1 mg  1 mg IntraVENous Q4H PRN    melatonin tablet 6 mg  6 mg Oral QHS PRN    metoprolol (LOPRESSOR) injection 5 mg  5 mg IntraVENous Q6H PRN    lidocaine 4 % patch 1 Patch  1 Patch TransDERmal Q24H    sodium chloride (NS) flush 5-40 mL  5-40 mL IntraVENous Q8H    sodium chloride (NS) flush 5-40 mL  5-40 mL IntraVENous PRN    acetaminophen (TYLENOL) tablet 650 mg  650 mg Oral Q6H PRN    Or    acetaminophen (TYLENOL) suppository 650 mg  650 mg Rectal Q6H PRN    polyethylene glycol (MIRALAX) packet 17 g  17 g Oral DAILY PRN    ondansetron (ZOFRAN) injection 4 mg  4 mg IntraVENous Q6H PRN    pantoprazole (PROTONIX) tablet 40 mg  40 mg Oral DAILY    montelukast (SINGULAIR) tablet 10 mg  10 mg Oral QHS    [Held by provider] polyethylene glycol (MIRALAX) packet 17 g  17 g Oral DAILY    rOPINIRole (REQUIP) tablet 3 mg  3 mg Oral TID    glucose chewable tablet 16 g  4 Tablet Oral PRN    dextrose (D50W) injection syrg 12.5-25 g  12.5-25 g IntraVENous PRN    glucagon (GLUCAGEN) injection 1 mg  1 mg IntraMUSCular PRN    atenoloL (TENORMIN) tablet 25 mg  25 mg Oral DAILY    apixaban (ELIQUIS) tablet 2.5 mg  2.5 mg Oral Q12H    albuterol (PROVENTIL VENTOLIN) nebulizer solution 2.5 mg  2.5 mg Nebulization Q4H PRN    insulin lispro (HUMALOG) injection   SubCUTAneous AC&HS    influenza vaccine  (6 mos+)(PF) (FLUARIX/FLULAVAL/FLUZONE QUAD) injection 0.5 mL  1 Each IntraMUSCular PRIOR TO DISCHARGE          Objective:     Vitals:  Blood pressure 115/65, pulse 83, temperature 99 °F (37.2 °C), resp. rate 19, height 5' 4\" (1.626 m), weight 73.3 kg (161 lb 9.6 oz), SpO2 97 %, not currently breastfeeding. Temp (24hrs), Av.5 °F (36.9 °C), Min:97.6 °F (36.4 °C), Max:99 °F (37.2 °C)      Intake and Output:  No intake/output data recorded. 10/30 1901 -  0700  In: 700 [P.O.:350; I.V.:350]  Out: 775 [Urine:775]    Physical Exam:               GENERAL ASSESSMENT: Elderly F NAD  CHEST: No distress, on NC O2, faint basilar crackles  HEART: S1S2  ABDOMEN: Soft,NT  EXTREMITY: +EDEMA  NEURO: Grossly non focal          ECG/rhythm:    Data Review      No results for input(s): TNIPOC in the last 72 hours. No lab exists for component: ITNL   No results for input(s): CPK, CKMB, TROIQ in the last 72 hours.   Recent Labs     21  0525 10/31/21  0515 10/30/21  0414    134* 132*   K 3.8 4.1 4.9   CL 96* 95* 96*   CO2 34* 33* 28   BUN 88* 88* 86*   CREA 1.82* 2.36* 2.73*   * 121* 158*   CA 8.7 8.9 8.9   WBC  --  10.6 14.7*   HGB  --  12.8 13.0   HCT  --  38.7 40.4   PLT  --  85* 91*      No results for input(s): INR, PTP, APTT, INREXT, INREXT in the last 72 hours. Needs: urine analysis, urine sodium, protein and creatinine  No results found for: DANTE TAN      : Jannet Sanon MD  11/1/2021        [facility]:  [email or web address]  [place] office:  [address], [address] [place] [address],[address]  [place], [address] [facility]  Phone: [phone]  Fax :     [phone]    [place] office:  [address] [facility], [address] [place] [address]  Phone - [phone]  Fax - [phone]

## 2021-11-01 NOTE — PROGRESS NOTES
Transition of Care Plan:     RUR:  14% - low   Disposition: Home with Son who provides 24/7 supervision  Follow up appointments: PCP, Specialists  DME needed: Pt has a walker.   Transportation at Discharge: Pt's son will transport.   Liz Bullocks or means to access home: Son will provide.   IM Medicare Letter: needed at d/c  Is patient a BCPI-A Bundle: No                  If yes, was Bundle Letter given?:   n/a  Caregiver Contact:Micheal Cornell [phone]  Discharge Caregiver contacted prior to discharge? CM will contact prior to d/c. Initial Note: Pt still on floor. Palliative to meet with pt on 11/2 to determine [address]. CM will continue to follow for dc planning.      Dottie Olivera MSW  Care Manager, [address] [place] [address]

## 2021-11-01 NOTE — PROGRESS NOTES
1900: shift report given by Cristy Heller RN Pt BP low, Pt is asymptomatic at this time. Just complains of being tired and wanting to rest. At this time RRN was called and she suggested the give a 250 fluid bolus. ,Albumin initiated instead ( see MAR) Orders of [place]. Pt BP continues to drop a Rapid Called. No new orders placed at this time just monitor at this time. Call if MAP below mid 50's     Pt stable this time. BP responding to albumin. 2145: Pt BP still low with Low MAP. Contacted NP . New albumin orders placed   Pt continues to be asymptomatic, all OTHER vitals are stable at this time. Will continue to monitor. 0700:  End of Shift Note    Bedside shift change report given to Karol Huddleston RN  (oncoming nurse) by Mimi Connor (offgoing nurse). Report included the following information SBAR, Kardex, MAR, Recent Results and Cardiac Rhythm AFIB    Shift worked:  [id number]     Shift summary and any significant changes:     Pt had Low Bps over the night. Rapid called at Shift change . Albumin ordered      Concerns for physician to address:  see note above      Zone phone for oncoming shift:          Activity:  Activity Level: Bed Rest  Number times ambulated in hallways past shift: 0  Number of times OOB to chair past shift: 0    Cardiac:   Cardiac Monitoring: Yes      Cardiac Rhythm: Atrial Fib    Access:   Current line(s): PIV     Genitourinary:   Urinary status: voiding and external catheter    Respiratory:   O2 Device: Nasal cannula  Chronic home O2 use?: YES  Incentive spirometer at bedside: NO     GI:  Last Bowel Movement Date: 10/26/21  Current diet:  ADULT DIET Regular; 3 carb choices (45 gm/meal); No Salt Added (3-4 gm)  ADULT ORAL NUTRITION SUPPLEMENT Breakfast, Lunch, Dinner; Other Supplement;  Ensure  Passing flatus: YES  Tolerating current diet: YES       Pain Management:   Patient states pain is manageable on current regimen: YES    Skin:  Malik Score: 15  Interventions: turn team, speciality bed, increase time out of bed and internal/external urinary devices    Patient Safety:  Fall Score:  Total Score: 3  Interventions: bed/chair alarm, assistive device (walker, cane, etc), gripper socks and pt to call before getting OOB  High Fall Risk: Yes    Length of Stay:  Expected LOS: 4d 19h  Actual LOS: [address]

## 2021-11-01 NOTE — PROGRESS NOTES
Progress Note      11/1/2021 7:04 AM  NAME: Nam Gold   MRN:  [id number]   Admit Diagnosis: Acute respiratory failure with hypoxia [facility]) [J96.01]                Assessment:       - Acute on chronic systolic chf     - Cath 2564 with mild CAD  - Severe AS s/p TAVR in 2013 with 23mm Chintan, echo 10/2021 EF 35%, mean gradient of 24mm Hg across valve, mild to moderate AI, mild to moderate MR  - PAF on anticoagulation and rate control  - HTN  - CKD  - Macular degneration  - Arthritis  , god son takes care of her, walker/wheelchair  DNR                        Plan:        - Equivicol troponin, manage medically  - Volume overload, diurese  - PAF continue anticoagulation and rate control     - Cont eliquis 2.5mg bid   - Cont atenolol 25mg currently rate controlled  - Holding diovan due to increased cr  - BP low after IV diuresis, Discharge on lasix 40mg po daily     Home once off of O2 vs. Assess for home O2             [x]? High complexity decision making was performed      We discussed the expected course, resolution and complications of the diagnoses in detail. Medication risk, benefits, costs, interactions, and alternatives were discussed as indicated. I advised him to contact the office if his condition worsens, changes or fails to improve as anticipated. Patient expressed understanding with the diagnoses  and plan. Subjective:     Nam Gold denies chest pain, dyspnea. Discussed with RN events overnight. Review of Systems:    Symptom Y/N Comments  Symptom Y/N Comments   Fever/Chills N   Chest Pain N    Poor Appetite N   Edema N    Cough N   Abdominal Pain N    Sputum N   Joint Pain N    SOB/MATTA N   Pruritis/Rash N    Nausea/vomit N   Tolerating PT/OT Y    Diarrhea N   Tolerating Diet Y    Constipation N   Other       Could NOT obtain due to:      Objective:      Physical Exam:    Last 24hrs VS reviewed since prior progress note.  Most recent are:    Visit Vitals  BP (!) 112/43 Pulse 86   Temp 98.5 °F (36.9 °C)   Resp 16   Ht 5' 4\" (1.626 m)   Wt 73.3 kg (161 lb 9.6 oz)   SpO2 99%   Breastfeeding No   BMI 27.74 kg/m²       Intake/Output Summary (Last 24 hours) at 11/1/2021 0704  Last data filed at 10/31/2021 1934  Gross per 24 hour   Intake 700 ml   Output 25 ml   Net 675 ml        General Appearance: Well developed, well nourished, alert & oriented x 3,    no acute distress. Ears/Nose/Mouth/Throat: Hearing grossly normal.  Neck: Supple. Chest: Lungs clear to auscultation bilaterally. Cardiovascular: Regular rate and rhythm, S1S2 normal, no murmur. Abdomen: Soft, non-tender, bowel sounds are active. Extremities: No edema bilaterally. Skin: Warm and dry. PMH/SH reviewed - no change compared to H&P    Data Review    Telemetry: normal sinus rhythm     Lab Data Personally Reviewed:    Recent Labs     10/31/21  0515 10/30/21  0414   WBC 10.6 14.7*   HGB 12.8 13.0   HCT 38.7 40.4   PLT 85* 91*     No results for input(s): INR, PTP, APTT, INREXT in the last 72 hours. Recent Labs     11/01/21  0525 10/31/21  0515 10/30/21  0414    134* 132*   K 3.8 4.1 4.9   CL 96* 95* 96*   CO2 34* 33* 28   BUN 88* 88* 86*   CREA 1.82* 2.36* 2.73*   * 121* 158*   CA 8.7 8.9 8.9     No results for input(s): CPK, CKNDX, TROIQ in the last 72 hours. No lab exists for component: CPKMB  No results found for: CHOL, CHOLX, CHLST, CHOLV, HDL, HDLP, LDL, LDLC, DLDLP, TGLX, TRIGL, TRIGP, CHHD, CHHDX    No results for input(s): AP, TBIL, TP, ALB, GLOB, GGT, AML, LPSE in the last 72 hours. No lab exists for component: SGOT, GPT, AMYP, HLPSE  No results for input(s): PH, PCO2, PO2 in the last 72 hours.     Medications Personally Reviewed:    Current Facility-Administered Medications   Medication Dose Route Frequency    docusate sodium (COLACE) capsule 100 mg  100 mg Oral DAILY PRN    prochlorperazine (COMPAZINE) with saline injection 5 mg  5 mg IntraVENous Q6H PRN    methocarbamoL (ROBAXIN) tablet 750 mg  750 mg Oral TID PRN    oxyCODONE IR (ROXICODONE) tablet 5 mg  5 mg Oral Q4H PRN    [Held by provider] furosemide (LASIX) injection 40 mg  40 mg IntraVENous BID    budesonide (PULMICORT) 250 mcg/2ml nebulizer susp  250 mcg Nebulization BID RT    And    arformoteroL (BROVANA) neb solution 15 mcg  15 mcg Nebulization BID RT    morphine injection 1 mg  1 mg IntraVENous Q4H PRN    melatonin tablet 6 mg  6 mg Oral QHS PRN    metoprolol (LOPRESSOR) injection 5 mg  5 mg IntraVENous Q6H PRN    lidocaine 4 % patch 1 Patch  1 Patch TransDERmal Q24H    sodium chloride (NS) flush 5-40 mL  5-40 mL IntraVENous Q8H    sodium chloride (NS) flush 5-40 mL  5-40 mL IntraVENous PRN    acetaminophen (TYLENOL) tablet 650 mg  650 mg Oral Q6H PRN    Or    acetaminophen (TYLENOL) suppository 650 mg  650 mg Rectal Q6H PRN    polyethylene glycol (MIRALAX) packet 17 g  17 g Oral DAILY PRN    ondansetron (ZOFRAN) injection 4 mg  4 mg IntraVENous Q6H PRN    pantoprazole (PROTONIX) tablet 40 mg  40 mg Oral DAILY    montelukast (SINGULAIR) tablet 10 mg  10 mg Oral QHS    [Held by provider] polyethylene glycol (MIRALAX) packet 17 g  17 g Oral DAILY    rOPINIRole (REQUIP) tablet 3 mg  3 mg Oral TID    glucose chewable tablet 16 g  4 Tablet Oral PRN    dextrose (D50W) injection syrg 12.5-25 g  12.5-25 g IntraVENous PRN    glucagon (GLUCAGEN) injection 1 mg  1 mg IntraMUSCular PRN    atenoloL (TENORMIN) tablet 25 mg  25 mg Oral DAILY    apixaban (ELIQUIS) tablet 2.5 mg  2.5 mg Oral Q12H    albuterol (PROVENTIL VENTOLIN) nebulizer solution 2.5 mg  2.5 mg Nebulization Q4H PRN    insulin lispro (HUMALOG) injection   SubCUTAneous AC&HS    influenza vaccine 2021-22 (6 mos+)(PF) (FLUARIX/FLULAVAL/FLUZONE QUAD) injection 0.5 mL  1 Each IntraMUSCular PRIOR TO DISCHARGE         Rubia Hall MD

## 2021-11-02 LAB
ANION GAP SERPL CALC-SCNC: 3 MMOL/L (ref 5–15)
BACTERIA SPEC CULT: NORMAL
BACTERIA SPEC CULT: NORMAL
BASOPHILS # BLD: 0 K/UL (ref 0–0.1)
BASOPHILS NFR BLD: 0 % (ref 0–1)
BUN SERPL-MCNC: 76 MG/DL (ref 6–20)
BUN/CREAT SERPL: 50 (ref 12–20)
CALCIUM SERPL-MCNC: 9.2 MG/DL (ref 8.5–10.1)
CHLORIDE SERPL-SCNC: 96 MMOL/L (ref 97–108)
CO2 SERPL-SCNC: 36 MMOL/L (ref 21–32)
CREAT SERPL-MCNC: 1.52 MG/DL (ref 0.55–1.02)
DIFFERENTIAL METHOD BLD: ABNORMAL
EOSINOPHIL # BLD: 0.2 K/UL (ref 0–0.4)
EOSINOPHIL NFR BLD: 2 % (ref 0–7)
ERYTHROCYTE [DISTWIDTH] IN BLOOD BY AUTOMATED COUNT: 12.2 % (ref 11.5–14.5)
GLUCOSE BLD STRIP.AUTO-MCNC: 138 MG/DL (ref 65–117)
GLUCOSE BLD STRIP.AUTO-MCNC: 148 MG/DL (ref 65–117)
GLUCOSE BLD STRIP.AUTO-MCNC: 197 MG/DL (ref 65–117)
GLUCOSE BLD STRIP.AUTO-MCNC: 214 MG/DL (ref 65–117)
GLUCOSE SERPL-MCNC: 135 MG/DL (ref 65–100)
HCT VFR BLD AUTO: 35.5 % (ref 35–47)
HGB BLD-MCNC: 11.5 G/DL (ref 11.5–16)
IMM GRANULOCYTES # BLD AUTO: 0.1 K/UL (ref 0–0.04)
IMM GRANULOCYTES NFR BLD AUTO: 1 % (ref 0–0.5)
LYMPHOCYTES # BLD: 1.1 K/UL (ref 0.8–3.5)
LYMPHOCYTES NFR BLD: 13 % (ref 12–49)
MCH RBC QN AUTO: 31.3 PG (ref 26–34)
MCHC RBC AUTO-ENTMCNC: 32.4 G/DL (ref 30–36.5)
MCV RBC AUTO: 96.5 FL (ref 80–99)
MONOCYTES # BLD: 0.8 K/UL (ref 0–1)
MONOCYTES NFR BLD: 10 % (ref 5–13)
NEUTS SEG # BLD: 6.2 K/UL (ref 1.8–8)
NEUTS SEG NFR BLD: 73 % (ref 32–75)
NRBC # BLD: 0 K/UL (ref 0–0.01)
NRBC BLD-RTO: 0 PER 100 WBC
PLATELET # BLD AUTO: 87 K/UL (ref 150–400)
PMV BLD AUTO: 12.1 FL (ref 8.9–12.9)
POTASSIUM SERPL-SCNC: 4.1 MMOL/L (ref 3.5–5.1)
RBC # BLD AUTO: 3.68 M/UL (ref 3.8–5.2)
SERVICE CMNT-IMP: ABNORMAL
SERVICE CMNT-IMP: NORMAL
SERVICE CMNT-IMP: NORMAL
SODIUM SERPL-SCNC: 135 MMOL/L (ref 136–145)
WBC # BLD AUTO: 8.5 K/UL (ref 3.6–11)

## 2021-11-02 PROCEDURE — 85025 COMPLETE CBC W/AUTO DIFF WBC: CPT

## 2021-11-02 PROCEDURE — 74011636637 HC RX REV CODE- 636/637: Performed by: NURSE PRACTITIONER

## 2021-11-02 PROCEDURE — 97535 SELF CARE MNGMENT TRAINING: CPT

## 2021-11-02 PROCEDURE — 74011250636 HC RX REV CODE- 250/636: Performed by: NURSE PRACTITIONER

## 2021-11-02 PROCEDURE — 36415 COLL VENOUS BLD VENIPUNCTURE: CPT

## 2021-11-02 PROCEDURE — 97166 OT EVAL MOD COMPLEX 45 MIN: CPT

## 2021-11-02 PROCEDURE — 74011000250 HC RX REV CODE- 250: Performed by: NURSE PRACTITIONER

## 2021-11-02 PROCEDURE — 99222 1ST HOSP IP/OBS MODERATE 55: CPT | Performed by: NURSE PRACTITIONER

## 2021-11-02 PROCEDURE — 82962 GLUCOSE BLOOD TEST: CPT

## 2021-11-02 PROCEDURE — 80048 BASIC METABOLIC PNL TOTAL CA: CPT

## 2021-11-02 PROCEDURE — 74011250637 HC RX REV CODE- 250/637: Performed by: NURSE PRACTITIONER

## 2021-11-02 PROCEDURE — 94640 AIRWAY INHALATION TREATMENT: CPT

## 2021-11-02 PROCEDURE — 74011250637 HC RX REV CODE- 250/637: Performed by: STUDENT IN AN ORGANIZED HEALTH CARE EDUCATION/TRAINING PROGRAM

## 2021-11-02 PROCEDURE — 65660000000 HC RM CCU STEPDOWN

## 2021-11-02 PROCEDURE — 74011250637 HC RX REV CODE- 250/637: Performed by: INTERNAL MEDICINE

## 2021-11-02 PROCEDURE — 74011000250 HC RX REV CODE- 250: Performed by: INTERNAL MEDICINE

## 2021-11-02 RX ADMIN — ROPINIROLE HYDROCHLORIDE 3 MG: 1 TABLET, FILM COATED ORAL at 16:55

## 2021-11-02 RX ADMIN — APIXABAN 2.5 MG: 2.5 TABLET, FILM COATED ORAL at 22:22

## 2021-11-02 RX ADMIN — SODIUM CHLORIDE 10 ML: 9 INJECTION, SOLUTION INTRAMUSCULAR; INTRAVENOUS; SUBCUTANEOUS at 22:23

## 2021-11-02 RX ADMIN — BUDESONIDE 250 MCG: 0.25 INHALANT RESPIRATORY (INHALATION) at 07:30

## 2021-11-02 RX ADMIN — SODIUM CHLORIDE 10 ML: 9 INJECTION, SOLUTION INTRAMUSCULAR; INTRAVENOUS; SUBCUTANEOUS at 13:22

## 2021-11-02 RX ADMIN — ARFORMOTEROL TARTRATE 15 MCG: 15 SOLUTION RESPIRATORY (INHALATION) at 07:30

## 2021-11-02 RX ADMIN — BUDESONIDE 250 MCG: 0.25 INHALANT RESPIRATORY (INHALATION) at 21:00

## 2021-11-02 RX ADMIN — INSULIN LISPRO 3 UNITS: 100 INJECTION, SOLUTION INTRAVENOUS; SUBCUTANEOUS at 08:38

## 2021-11-02 RX ADMIN — ONDANSETRON 4 MG: 2 INJECTION INTRAMUSCULAR; INTRAVENOUS at 08:38

## 2021-11-02 RX ADMIN — ROPINIROLE HYDROCHLORIDE 3 MG: 1 TABLET, FILM COATED ORAL at 08:37

## 2021-11-02 RX ADMIN — ARFORMOTEROL TARTRATE 15 MCG: 15 SOLUTION RESPIRATORY (INHALATION) at 21:00

## 2021-11-02 RX ADMIN — ATENOLOL 25 MG: 25 TABLET ORAL at 08:37

## 2021-11-02 RX ADMIN — APIXABAN 2.5 MG: 2.5 TABLET, FILM COATED ORAL at 08:38

## 2021-11-02 RX ADMIN — SODIUM CHLORIDE 10 ML: 9 INJECTION, SOLUTION INTRAMUSCULAR; INTRAVENOUS; SUBCUTANEOUS at 05:35

## 2021-11-02 RX ADMIN — INSULIN LISPRO 4 UNITS: 100 INJECTION, SOLUTION INTRAVENOUS; SUBCUTANEOUS at 11:44

## 2021-11-02 RX ADMIN — MONTELUKAST 10 MG: 10 TABLET, FILM COATED ORAL at 22:22

## 2021-11-02 RX ADMIN — ROPINIROLE HYDROCHLORIDE 3 MG: 1 TABLET, FILM COATED ORAL at 22:22

## 2021-11-02 RX ADMIN — FUROSEMIDE 40 MG: 40 TABLET ORAL at 08:37

## 2021-11-02 RX ADMIN — MELATONIN 6 MG: at 22:22

## 2021-11-02 RX ADMIN — PANTOPRAZOLE SODIUM 40 MG: 40 TABLET, DELAYED RELEASE ORAL at 08:38

## 2021-11-02 NOTE — PROGRESS NOTES
Problem: Self Care Deficits Care Plan (Adult)  Goal: *Acute Goals and Plan of Care (Insert Text)  Outcome: Progressing Towards Goal  Note:   FUNCTIONAL STATUS PRIOR TO ADMISSION: Patient was modified independent using a rollator for functional mobility. HOME SUPPORT: The patient lived with god son who is available to assist.    Initial Occupational Therapy Goals (11/2/2021) Within 7 day(s):  1. Patient will perform grooming seated EOB with minimal assist for increased independence with ADLs. 2. Patient will perform UB dressing with minimal assist for increased independence with ADLs. 3. Patient will perform LB dressing with moderate assist & A/E PRN for increased independence with ADLs. 4. Patient will perform all aspects of toileting with moderate assist for increased independence in ADLs  5. Patient will independently apply energy conservation techniques with 1 verbal cue(s) for increased independence with ADLs. 6. Patient will utilize good body mechanics during ADLs with 1 verbal cue(s). OCCUPATIONAL THERAPY EVALUATION  Patient: Rachel Lou (44 y.o. female)  Date: 11/2/2021  Primary Diagnosis: Acute respiratory failure with hypoxia ([facility] [address] [J96.01]        Precautions:   Aspiration, Fall, Skin    ASSESSMENT  Based on the objective data described below, the patient presents with significant decline. Pt w/ variations in B/P, but actually improved with sitting EOB. Pt is motivated for EOB and OOB. Pt would like to return to function but is accepting of age and limitations. Pt grossly max/total assist d/t limited endurance. Current Level of Function Impacting Discharge (ADLs/self-care): max/total assist w/ ADLs, but caregiver wants to take her home w/ some extra assist    Functional Outcome Measure: The patient scored Total: 5/100 on the Barthel Index outcome measure which is indicative of being severely impaired in basic self-care.        Other factors to consider for discharge: irregular B/P and BS     Patient will benefit from skilled therapy intervention to address the above noted impairments. PLAN :  Recommendations and Planned Interventions: self care training, functional mobility training, therapeutic exercise, balance training, therapeutic activities, cognitive retraining, endurance activities, patient education, and family training/education    Frequency/Duration: Patient will be followed by occupational therapy 3 times a week to address goals. Recommendation for discharge: (in order for the patient to meet his/her long term goals)  Occupational therapy at least 2 days/week in the home AND ensure assist and/or supervision for safety with skin and falls    This discharge recommendation:  Has not yet been discussed the attending provider and/or case management    IF patient discharges home will need the following DME: To Be Determined (TBD) at next level of care        SUBJECTIVE:   Patient stated I want to be able to get out of the bed.     OBJECTIVE DATA SUMMARY:   HISTORY:   Past Medical History:   Diagnosis Date    CAD (coronary artery disease)     CKD (chronic kidney disease)     Hypertension    No past surgical history on file. Expanded or extensive additional review of patient history:     Home Situation  Home Environment: Private residence  # Steps to Enter: 3  Rails to Enter: Yes  Hand Rails : Bilateral  Wheelchair Ramp: No  One/Two Story Residence: One story  Living Alone: No  Support Systems: Other (Comment) (God son)  Patient Expects to be Discharged to[de-identified] [employer]  Current DME Used/Available at Home: Grab bars, Wheelchair, Walker, rollator, Walker, rolling, Raised toilet seat  Tub or Shower Type: Shower    Hand dominance: Right    EXAMINATION OF PERFORMANCE DEFICITS:  Cognitive/Behavioral Status:  Neurologic State: Alert; Appropriate for age  Orientation Level: Oriented X4  Cognition: Appropriate decision making; Appropriate for age attention/concentration; Appropriate safety awareness; Follows commands  Perception: Appears intact  Perseveration: No perseveration noted  Safety/Judgement: Awareness of environment    Skin: at risk    Edema: No significant edema noted     Hearing: Auditory  Auditory Impairment: Hard of hearing, bilateral    Vision/Perceptual:       Grossly intact    Range of Motion:  AROM: Generally decreased, functional  PROM: Generally decreased, functional    Strength:  Strength: Generally decreased, functional    Coordination:  Coordination: Generally decreased, functional  Fine Motor Skills-Upper: Left Intact; Right Intact    Gross Motor Skills-Upper: Left Intact; Right Intact    Tone & Sensation:  Tone: Normal  Sensation: Intact    Balance:  Sitting: High guard; Intact    Functional Mobility and Transfers for ADLs:  Bed Mobility:  Rolling: Moderate assistance  Supine to Sit: Moderate assistance  Sit to Supine: Moderate assistance  Scooting: Maximum assistance    ADL Assessment:  Feeding: Moderate assistance    Oral Facial Hygiene/Grooming: Maximum assistance    Bathing: Maximum assistance    Upper Body Dressing: Maximum assistance    Lower Body Dressing: Maximum assistance    Toileting: Total assistance    ADL Intervention and task modifications:  Feeding  Food to Mouth: Maximum assistance    Upper Body [address]: Maximum assistance    Lower Body Dressing Assistance  Socks: Total assistance (dependent)    Toileting  Toileting Assistance: Total assistance(dependent)  Bladder Hygiene: Total assistance (dependent)  Bowel Hygiene: Total assistance (dependent)    Cognitive Retraining  Following Commands: Awareness of environment; Follows one step commands/directions  Safety/Judgement: Awareness of environment    Functional Measure:    Barthel Index:  Bathin  Bladder: 0  Bowels: 0  Groomin  Dressin  Feedin  Mobility: 0  Stairs: 0  Toilet Use: 0  Transfer (Bed to Chair and Back): 0  Total: 5/100      The Barthel ADL Index: Guidelines  1. The index should be used as a record of what a patient does, not as a record of what a patient could do. 2. The main aim is to establish degree of independence from any help, physical or verbal, however minor and for whatever reason. 3. The need for supervision renders the patient not independent. 4. A patient's performance should be established using the best available evidence. Asking the patient, friends/relatives and nurses are the usual sources, but direct observation and common sense are also important. However direct testing is not needed. 5. Usually the patient's performance over the preceding 24-48 hours is important, but occasionally longer periods will be relevant. 6. Middle categories imply that the patient supplies over 50 per cent of the effort. 7. Use of aids to be independent is allowed. Score Interpretation (from [address] [address])    Independent   60-79 Minimally independent   40-59 Partially dependent   20-39 Very dependent   <20 Totally dependent     -Shavon Woods., BarthelEDIEW. (1965). Functional evaluation: the Barthel Index. [facility] ([address] (1997). The Barthel activities of daily living index: self-reporting versus actual performance in the old (> or = 75 years). Journal of [address] [place] [address] 45(7), [facility], J.JCORI.F, Cortes Pineda., Cassidy Barnett. (1999). Measuring the change in disability after inpatient rehabilitation; comparison of the responsiveness of the Barthel Index and Functional Nelson Measure. Journal of Neurology, Neurosurgery, and Psychiatry, 66(4), 559-659. Geovani Wilson, N.J.A, GONZÁLEZ Mueller, & Carey Manley M.A. (2004) Assessment of post-stroke quality of life in cost-effectiveness studies: The usefulness of the Barthel Index and the EuroQoL-5D.  Quality of Life Research, 15, 272-92     Occupational Therapy Evaluation Charge Determination   History Examination Decision-Making   MEDIUM Complexity : Expanded review of history including physical, cognitive and psychosocial  history  MEDIUM Complexity : 3-5 performance deficits relating to physical, cognitive , or psychosocial skils that result in activity limitations and / or participation restrictions MEDIUM Complexity : Patient may present with comorbidities that affect occupational performnce. Miniml to moderate modification of tasks or assistance (eg, physical or verbal ) with assesment(s) is necessary to enable patient to complete evaluation       Based on the above components, the patient evaluation is determined to be of the following complexity level: MEDIUM  Pain Rating:  3/10    Activity Tolerance:   requires frequent rest breaks    After treatment patient left in no apparent distress:    Patient positioned in RIGHT sidelying for pressure relief    COMMUNICATION/EDUCATION:   The patients plan of care was discussed with: Registered nurse. Patient/family have participated as able in goal setting and plan of care. This patients plan of care is appropriate for delegation to \[facility]\"".     Thank you for this referral.  Leonila Oquendo  Time Calculation: 39 mins

## 2021-11-02 NOTE — PROGRESS NOTES
Hospitalist Progress Note    NAME: Miguel Davila   :  3/7/1927   MRN:  [id number]       Assessment / Plan:  Acute hypoxic respiratory failure, POA Likely secondary to   Acute congestive heart failure, POA  Cardiogenic pulmonary edema, POA  SIRS (tachycardia and leukocytosis) with no obvious source of infection    Was admitted initially to ICU and transferred to floor same day  Lasix resumed, monitor blood pressure intermittently dropping. ECHO show EF 35-40, elevated BNP, CXR show pulmonary edema and bilateral effusion  Daily weights, I and O  Steroids stopped as this is not COPD exacerbation  C/w nebulizers  UA negative, CXR doesn't show consolidation  Procal .27, lactate normal  Azithromycin course completed  Blood pressure better today     A. fib with RVR  History of paroxysmal A. fib  C/w eliquis  On atenolol   Controlled today     ROCÍO on CKD stage III  Creatinine improving  Likely cardiorenal syndrome + Left hydronephrosis, Urology following  Bladder scan didn't show significant retention  CT abdomen showing left hydronephrosis    Low back pain:  Complains of left low back pain  CT show: 1. Severe progressive left hydronephrosis with neoplasia not excluded. 2. Lumbar degenerative disc disease as detailed. No fracture. Could be d/t hydronephrosis, pain control  CT also showed Hiatal hernia with non obstructive volvulus, however not having any symptoms from this currently.  She is aware that she has a large hiatal hernia     25.0 - 29.9 Overweight / Body mass index is 29.2 kg/m².     Estimated discharge date: 11/3  Barriers: Hospice consult, will likely need hospice set up for discharge     Code status: DNR  Prophylaxis: Eliquis   Recommended Disposition: Home with home health + hospice     Subjective:     Chief Complaint / Reason for Physician Visit  Follow up for ARF,/Afib RVR  She feels better today  Her blood pressure remains soft    Review of Systems:  Symptom Y/N Comments  Symptom Y/N Comments Fever/Chills n   Chest Pain n    Poor Appetite n   Edema     Cough    Abdominal Pain     Sputum    Joint Pain     SOB/MATTA    Pruritis/Rash     Nausea/vomit    Tolerating PT/OT     Diarrhea    Tolerating Diet     Constipation    Other       Could NOT obtain due to:      Objective:     VITALS:   Last 24hrs VS reviewed since prior progress note. Most recent are:  Patient Vitals for the past 24 hrs:   Temp Pulse Resp BP SpO2   11/02/21 1419 98.1 °F (36.7 °C) 77 19 (!) 144/65 96 %   11/02/21 1050 98.7 °F (37.1 °C) 92 16 (!) 131/55 97 %   11/02/21 0815     97 %   11/02/21 0730 98.9 °F (37.2 °C) 90 16 (!) 149/62 98 %   11/02/21 0300 98.3 °F (36.8 °C) 98 14 (!) 146/59 95 %   11/01/21 2330 98.4 °F (36.9 °C) 94 14 122/84 96 %   11/01/21 1930 99.3 °F (37.4 °C) 97 16 (!) 135/43 96 %   11/01/21 1530  85  (!) 132/46 96 %   11/01/21 1501 100 °F (37.8 °C) 96 14 (!) 146/52 95 %       Intake/Output Summary (Last 24 hours) at 11/2/2021 1444  Last data filed at 11/2/2021 0300  Gross per 24 hour   Intake 240 ml   Output 1270 ml   Net -1030 ml        I had a face to face encounter and independently examined this patient on 11/2/2021, as outlined below:  PHYSICAL EXAM:  General: WD, WN. Alert, cooperative, no acute distress    EENT:  EOMI. Anicteric sclerae. MMM  Resp:  B/l crackles  CV:  Regular  rhythm,  Edema b/l LE L>R  GI:  Soft, Non distended, Non tender. +Bowel sounds  Neurologic:  Alert and oriented X 3, normal speech,   Psych:   Good insight. Not anxious nor agitated  Skin:  No rashes.   No jaundice    Reviewed most current lab test results and cultures  YES  Reviewed most current radiology test results   YES  Review and summation of old records today    NO  Reviewed patient's current orders and MAR    YES  PMH/SH reviewed - no change compared to H&P  ________________________________________________________________________  Care Plan discussed with:    Comments   Patient y    Family      RN y    Care Manager Consultant                        Multidiciplinary team rounds were held today with , nursing, pharmacist and clinical coordinator. Patient's plan of care was discussed; medications were reviewed and discharge planning was addressed. ________________________________________________________________________  Total NON critical care TIME: 35   Minutes    Total CRITICAL CARE TIME Spent:   Minutes non procedure based      Comments   >50% of visit spent in counseling and coordination of care     ________________________________________________________________________  Kirti Beltran MD     Procedures: see electronic medical records for all procedures/Xrays and details which were not copied into this note but were reviewed prior to creation of Plan. LABS:  I reviewed today's most current labs and imaging studies.   Pertinent labs include:  Recent Labs     11/02/21  0648 10/31/21  0515   WBC 8.5 10.6   HGB 11.5 12.8   HCT 35.5 38.7   PLT 87* 85*     Recent Labs     11/02/21  0648 11/01/21  0525 10/31/21  0515   * 136 134*   K 4.1 3.8 4.1   CL 96* 96* 95*   CO2 36* 34* 33*   * 121* 121*   BUN 76* 88* 88*   CREA 1.52* 1.82* 2.36*   CA 9.2 8.7 8.9       Signed: Kirti Beltran MD

## 2021-11-02 NOTE — PROGRESS NOTES
Comprehensive Nutrition Assessment    Type and Reason for Visit: Initial, RD nutrition re-screen/LOS    Nutrition Recommendations/Plan:  Continue current diet  Adjust ONS to Glucerna TID    Nutrition Assessment:     Patient medically noted for respiratory failure, AFIB, pneumonia, and CHF. PMH COPD, DM, and CKD. Chart reviewed for length of stay. Patient unavailable at time of attempted visit. PO intake varying 25-75% of meals. Ensure enlive ordered TID. Hyperglycemia noted. Will adjust to Glucerna for now. Palliative care meeting with family today; open to hospice at home per notes. Encourage intake of meals/supplements as tolerated. Patient Vitals for the past 168 hrs:   % Diet Eaten   11/01/21 1716 51 - 75%   11/01/21 1230 26 - 50%   11/01/21 0730 51 - 75%   10/31/21 1253 26 - 50%   10/27/21 1718 0%   10/27/21 0729 26 - 50%     Estimated Daily Nutrient Needs:  Energy (kcal): 1450 kcal (BMR 1115 x 1. 3AF); Weight Used for Energy Requirements: Current  Protein (g): 73-88g (1.0-1.2 g/kg bw); Weight Used for Protein Requirements: Current  Fluid (ml/day): 1450 mL; Method Used for Fluid Requirements: 1 ml/kcal    Nutrition Related Findings:       Na 135, -[phone]  BM 10/26  Lasix, Humalog, Protonix     Wounds:    None       Current Nutrition Therapies:  ADULT DIET Regular; 3 carb choices (45 gm/meal); No Salt Added (3-4 gm)  ADULT ORAL NUTRITION SUPPLEMENT Breakfast, Lunch, Dinner; Other Supplement; Ensure    Anthropometric Measures:  · Height:  5' 4\" (162.6 cm)  · Current Body Wt:  73.3 kg (161 lb 9.6 oz)    · BMI Category: Overweight (BMI 25.0-29. 9)       Nutrition Diagnosis:   · Inadequate protein-energy intake related to  (advanced age, decreased appetite) as evidenced by intake 26-50%, intake 51-75%    Nutrition Interventions:   Food and/or Nutrient Delivery: Continue current diet, Modify oral nutrition supplement  Nutrition Education and Counseling: No recommendations at this time  Coordination of Nutrition Care: No recommendation at this time    Goals:  PO intake >50% of meals/ONS next 5-7 days       Nutrition Monitoring and Evaluation:   Behavioral-Environmental Outcomes: None identified  Food/Nutrient Intake Outcomes: Food and nutrient intake, Supplement intake  Physical Signs/Symptoms Outcomes: Biochemical data, Weight    Discharge Planning:    Continue current diet, Continue oral nutrition supplement     Electronically signed by Mary Jefferson RD on 11/2/2021 at 11:28 AM    Contact: ext [phone]

## 2021-11-02 NOTE — CONSULTS
Palliative Medicine Consult  Dale: [phone] ([phone])    Patient Name: Jose Alberto Mcdonough  YOB: 1927    Date of Initial Consult: 10/31/21  Reason for Consult: Care Decisions  Requesting Provider: Ilya Quevedo MD   Primary Care Physician: Kyree Ibrahim MD     SUMMARY:   Jose Alberto Mcdonough is a 80 y. o. with a past history of CHF,DM, Afib, CKD stage III, HLD, TAVR  who was admitted on 10/26/2021 from home with a diagnosis of acute respiratory failure. Current medical issues leading to Palliative Medicine involvement include: Care decisions. Social: Lives with god son [place] near [place], [place]. PALLIATIVE DIAGNOSES:   1. Acute Respiratory Failure   2. Goals of Care  3. Constipation  4. Fraility       PLAN:   1. Prior to visit, I completed a  review of patient's medical records, including medical documentation, vital signs, MARs, and results of various labs and other diagnostics. I also spoke with patient's nurse, Silvana Rainey RN. 2. Examined patient in room she was very pleasant. She was sitting up in bed and stated she was \"still on the pot trying to have a BM\". Soon thereafter her god son Aquilino Banegas also [facility] entered the room. 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 She does have a nephew Mary Mcfadden [phone] who Aquilino Banegas states is also POA. Aquilino Banegas is her caretaker and lives with her and helps her with her ADLs and IADLs. She has been ambulating with a walker for the last 3 months and has had no falls. She has slowly been declining with regards to her functional capabilities. I spoke with Aquilino Banegas and asked to him to please bring a copy of her AMD and POA documents to put in her chart. We also discussed her possible discharge to home to hospice and both the patient and Aquilino Banegas were agreeable to this and would like an information session 11/3/21 sometime after lunch when Aquilino Banegas can be present.  They seem to have some exposure to hospice and were very open to the possibility of such. Consult placed for this. 3. Will follow up with Autumn Mayorga (POA/godson) tomorrow on getting copy of AMD and POA documents. 4. Initial consult note routed to primary continuity provider and/or primary health care team members  5. Communicated plan of care with: Palliative Oriana VALDIVIA [facility] Team     GOALS OF CARE / TREATMENT PREFERENCES:     GOALS OF CARE:       TREATMENT PREFERENCES:   Code Status: DNR          Advance Care Planning:  [x] [facility] Interdisciplinary Team has updated the ACP Navigator with Health Care Decision Maker and Patient Capacity      Advance Care Planning 10/26/2021   Patient's Healthcare Decision Maker is: (No Data)   Confirm Advance Directive Yes, not on file               Other:    As far as possible, the palliative care team has discussed with patient / health care proxy about goals of care / treatment preferences for patient. HISTORY:     History obtained from: Chart  CHIEF COMPLAINT: 79 yo  female with past medical history of CHF,DM, Afib, CKD stage III, HLD, TAVR who was admitted on 10/26/21 from [address] [place] [address] with complaints of shortness of breath. She had been having increasing shortness of breath for the last 2 weeks with bilateral lower extremity edema per Autumn Mayorga. Her oxygen saturations were in the mid 80s that improved on NRB. She continued to exhibit increased WOB and was placed on BiPAP. She was given lasix, methylpred, levofloxacin, and ceftriaxone. She was then transferred to [facility] for further workup and management.   HPI/SUBJECTIVE:    The patient is:   [x] Verbal and participatory  [] Non-participatory due to:       Clinical Pain Assessment (nonverbal scale for severity on nonverbal patients):   Clinical Pain Assessment  Severity: 0          Duration: for how long has pt been experiencing pain (e.g., 2 days, 1 month, years)  Frequency: how often pain is an issue (e.g., several times per day, once every few days, constant)     FUNCTIONAL ASSESSMENT:     Palliative Performance Scale (PPS):  PPS: 50       PSYCHOSOCIAL/SPIRITUAL SCREENING:     Palliative IDT has assessed this patient for cultural preferences / practices and a referral made as appropriate to needs (Cultural Services, Patient Advocacy, Ethics, etc.)    Any spiritual / Orthodoxy concerns:  [] Yes /  [x] No    Caregiver Burnout:  [] Yes /  [x] No /  [] No Caregiver Present      Anticipatory grief assessment:   [x] Normal  / [] Maladaptive       ESAS Anxiety: Anxiety: 0    ESAS Depression: Depression: 0        REVIEW OF SYSTEMS:     Positive and pertinent negative findings in ROS are noted above in HPI. The following systems were [x] reviewed / [] unable to be reviewed as noted in HPI  Other findings are noted below. Systems: constitutional, ears/nose/mouth/throat, respiratory, gastrointestinal, genitourinary, musculoskeletal, integumentary, neurologic, psychiatric, endocrine. Positive findings noted below. Modified ESAS Completed by: provider   Fatigue: 3 Drowsiness: 0   Depression: 0 Pain: 0   Anxiety: 0 Nausea: 3   Anorexia: 0 Dyspnea: 1     Constipation: Yes     Stool Occurrence(s): 1        PHYSICAL EXAM:     From RN flowsheet:  Wt Readings from Last 3 Encounters:   10/30/21 161 lb 9.6 oz (73.3 kg)     Blood pressure (!) 131/55, pulse 92, temperature 98.7 °F (37.1 °C), resp. rate 16, height 5' 4\" (1.626 m), weight 161 lb 9.6 oz (73.3 kg), SpO2 97 %, not currently breastfeeding.     Pain Scale 1: Numeric (0 - 10)  Pain Intensity 1: 0  Pain Onset 1: acute  Pain Location 1: Back  Pain Orientation 1: Lower  Pain Description 1: Aching  Pain Intervention(s) 1: Declines, Back rub, Warm pack, Other (comment) (Kpad)  Last bowel movement, if known: 10/28/21    Constitutional: Alert, pleasant, NAD  Eyes: PERRLA, anicteric  ENMT: no nasal discharge, moist mucous membranes  Cardiovascular: regular rhythm, IVCD distal pulses intact  Respiratory: breathing not labored, symmetric, anterior lungs CTA, posterior lung fields diminished  Gastrointestinal: soft non-tender, +bowel sounds  Musculoskeletal: no deformity, no tenderness to palpation  Skin: warm, dry  Neurologic: Alert and oriented x4, following commands, moving all extremities  Psychiatric: full affect, no hallucinations         HISTORY:     Active Problems:    Acute respiratory failure with hypoxia ([address] (10/26/2021)      Past Medical History:   Diagnosis Date    CAD (coronary artery disease)     CKD (chronic kidney disease)     Hypertension       No past surgical history on file. No family history on file. History reviewed, no pertinent family history.   Social History     Tobacco Use    Smoking status: Not on file   Substance Use Topics    Alcohol use: Not on file     Allergies   Allergen Reactions    Pcn [Penicillins] Not Reported This Time     Tolerated ceftriaxone 10/2021    Sulfa (Sulfonamide Antibiotics) Not Reported This Time      Current Facility-Administered Medications   Medication Dose Route Frequency    furosemide (LASIX) tablet 40 mg  40 mg Oral DAILY    docusate sodium (COLACE) capsule 100 mg  100 mg Oral DAILY PRN    prochlorperazine (COMPAZINE) with saline injection 5 mg  5 mg IntraVENous Q6H PRN    methocarbamoL (ROBAXIN) tablet 750 mg  750 mg Oral TID PRN    oxyCODONE IR (ROXICODONE) tablet 5 mg  5 mg Oral Q4H PRN    budesonide (PULMICORT) 250 mcg/2ml nebulizer susp  250 mcg Nebulization BID RT    And    arformoteroL (BROVANA) neb solution 15 mcg  15 mcg Nebulization BID RT    morphine injection 1 mg  1 mg IntraVENous Q4H PRN    melatonin tablet 6 mg  6 mg Oral QHS PRN    metoprolol (LOPRESSOR) injection 5 mg  5 mg IntraVENous Q6H PRN    lidocaine 4 % patch 1 Patch  1 Patch TransDERmal Q24H    sodium chloride (NS) flush 5-40 mL  5-40 mL IntraVENous Q8H    sodium chloride (NS) flush 5-40 mL  5-40 mL IntraVENous PRN    acetaminophen (TYLENOL) tablet 650 mg  650 mg Oral Q6H PRN    Or    acetaminophen (TYLENOL) suppository 650 mg  650 mg Rectal Q6H PRN    polyethylene glycol (MIRALAX) packet 17 g  17 g Oral DAILY PRN    ondansetron (ZOFRAN) injection 4 mg  4 mg IntraVENous Q6H PRN    pantoprazole (PROTONIX) tablet 40 mg  40 mg Oral DAILY    montelukast (SINGULAIR) tablet 10 mg  10 mg Oral QHS    [Held by provider] polyethylene glycol (MIRALAX) packet 17 g  17 g Oral DAILY    rOPINIRole (REQUIP) tablet 3 mg  3 mg Oral TID    glucose chewable tablet 16 g  4 Tablet Oral PRN    dextrose (D50W) injection syrg 12.5-25 g  12.5-25 g IntraVENous PRN    glucagon (GLUCAGEN) injection 1 mg  1 mg IntraMUSCular PRN    atenoloL (TENORMIN) tablet 25 mg  25 mg Oral DAILY    apixaban (ELIQUIS) tablet 2.5 mg  2.5 mg Oral Q12H    albuterol (PROVENTIL VENTOLIN) nebulizer solution 2.5 mg  2.5 mg Nebulization Q4H PRN    insulin lispro (HUMALOG) injection   SubCUTAneous AC&HS    influenza vaccine 2021-22 (6 mos+)(PF) (FLUARIX/FLULAVAL/FLUZONE QUAD) injection 0.5 mL  1 Each IntraMUSCular PRIOR TO DISCHARGE          LAB AND IMAGING FINDINGS:     Lab Results   Component Value Date/Time    WBC 8.5 11/02/2021 06:48 AM    HGB 11.5 11/02/2021 06:48 AM    PLATELET 87 (L) 02/18/2206 06:48 AM     Lab Results   Component Value Date/Time    Sodium 135 (L) 11/02/2021 06:48 AM    Potassium 4.1 11/02/2021 06:48 AM    Chloride 96 (L) 11/02/2021 06:48 AM    CO2 36 (H) 11/02/2021 06:48 AM    BUN 76 (H) 11/02/2021 06:48 AM    Creatinine 1.52 (H) 11/02/2021 06:48 AM    Calcium 9.2 11/02/2021 06:48 AM    Magnesium 2.4 10/27/2021 04:34 AM    Phosphorus 4.9 (H) 10/26/2021 01:36 AM      Lab Results   Component Value Date/Time    Alk.  phosphatase 65 10/28/2021 09:04 AM    Protein, total 6.6 10/28/2021 09:04 AM    Albumin 3.1 (L) 10/28/2021 09:04 AM    Globulin 3.5 10/28/2021 09:04 AM     Lab Results   Component Value Date/Time    INR 1.1 10/26/2021 01:36 AM    Prothrombin time 11.9 (H) 10/26/2021 01:36 AM    aPTT 20.4 (L) 10/26/2021 01:36 AM      No results found for: IRON, FE, TIBC, IBCT, PSAT, FERR   No results found for: PH, PCO2, PO2  No components found for: GLPOC   No results found for: CPK, CKMB             Total time: 65  Counseling / coordination time, spent as noted above: 45  > 50% counseling / coordination?: y    Prolonged service was provided for  []30 min   []75 min in face to face time in the presence of the patient, spent as noted above. Time Start:   Time End:   Note: this can only be billed with 30653 (initial) or 50756 (follow up). If multiple start / stop times, list each separately.

## 2021-11-02 NOTE — PROGRESS NOTES
Bedside and Verbal shift change report given to Spencer Monet RN/JESUS Awan (oncoming nurse) by JESUS Steiner (offgoing nurse). Report included the following information SBAR, Kardex, Intake/Output, MAR and Recent Results.

## 2021-11-02 NOTE — PROGRESS NOTES
0700: Bedside shift change report given to Suki Segura, RN and Yulia Quintero, RN (oncoming nurse) by JESUS Steiner (offgoing nurse). Report included the following information SBAR, Kardex, Intake/Output, MAR and Recent Results. 1045: Palliative care at bedside with patient and family. Will set up a hospice education session for tomorrow after lunch. 1900: End of Shift Note    Bedside shift change report given to oncsanty RN (oncoming nurse) by Bridget Leblanc (offgoing nurse). Report included the following information SBAR, Kardex, Intake/Output, MAR and Recent Results    Shift worked:  [id number]     Shift summary and any significant changes:     Hospice meeting tomorrow afternoon ,     Concerns for physician to address:  [address] [place] [address] 153 orders if patient doesn't go hospice     Zone phone for oncoming shift:   XXX       Activity:  Activity Level: Bed Rest  Number times ambulated in hallways past shift: 0  Number of times OOB to chair past shift: 0    Cardiac:   Cardiac Monitoring: Yes      Cardiac Rhythm: Atrial Fib    Access:   Current line(s): PIV     Genitourinary:   Urinary status: external catheter    Respiratory:   O2 Device: Nasal cannula  Chronic home O2 use?: NO  Incentive spirometer at bedside: N/A     GI:  Last Bowel Movement Date: 10/26/21  Current diet:  ADULT DIET Regular; 3 carb choices (45 gm/meal); No Salt Added (3-4 gm)  ADULT ORAL NUTRITION SUPPLEMENT Lunch, Dinner, Breakfast; Diabetic Supplement  Passing flatus: YES  Tolerating current diet: YES       Pain Management:   Patient states pain is manageable on current regimen: YES    Skin:  Malik Score: 16  Interventions: turn team, speciality bed, float heels, increase time out of bed and PT/OT consult    Patient Safety:  Fall Score:  Total Score: 3  Interventions: bed/chair alarm, gripper socks, pt to call before getting OOB and stay with me (per policy)  High Fall Risk: Yes    Length of Stay:  Expected LOS: 4d 19h  Actual LOS: [address]

## 2021-11-02 NOTE — PROGRESS NOTES
Nephrology Progress Note  Aquilino Stiles     [email or web address]  Phone - [phone]   Patient: Yani Jaimes    YOB: 1927        Date- 11/2/2021   Admit Date: 10/26/2021  CC: Follow up for rocío        IMPRESSION & PLAN:   · ROCÍO   · Left hydronephrosis - chronic  · Chronic Back pain  · CHF  · CAD 3b- bl cr 1.5  · Hypertension  · Sob- pulmonary edema  · afib with RVR   Atrophic right kidney    PLAN-   Continue lasix   Cr back to baseline   Terrie Gonzalez [address] Urology input noted   Okay to d/c renal stand point     Subjective: Interval History:   -  Cr improving  Sob improved    Objective:   Vitals:    11/01/21 1930 11/01/21 2330 11/02/21 0300 11/02/21 0730   BP: (!) 135/43 122/84 (!) 146/59 (!) 149/62   Pulse: 97 94 98 90   Resp: 16 14 14 16   Temp: 99.3 °F (37.4 °C) 98.4 °F (36.9 °C) 98.3 °F (36.8 °C) 98.9 °F (37.2 °C)   SpO2: 96% 96% 95% 98%   Weight:       Height:          11/01 0701 - 11/02 0700  In: 720 [P.O.:720]  Out: 5825 [Urine:1670]  Last 3 Recorded Weights in this Encounter    10/28/21 0654 10/28/21 1221 10/30/21 0520   Weight: 76.8 kg (169 lb 5 oz) 76.7 kg (169 lb) 73.3 kg (161 lb 9.6 oz)      Physical exam:   GEN: NAD  NECK- Supple, no mass  RESP: No wheezing, basal rales +  CVS: S1,S2  RRR  EXT: + edema  PSYCH:Can't access due to patient's current condition       Chart reviewed. Pertinent Notes reviewed. Data Review :  Recent Labs     11/02/21  0648 11/01/21  0525 10/31/21  0515   * 136 134*   K 4.1 3.8 4.1   CL 96* 96* 95*   CO2 36* 34* 33*   BUN 76* 88* 88*   CREA 1.52* 1.82* 2.36*   * 121* 121*   CA 9.2 8.7 8.9     Recent Labs     11/02/21  0648 10/31/21  0515   WBC 8.5 10.6   HGB 11.5 12.8   HCT 35.5 38.7   PLT 87* 85*     No results for input(s): FE, TIBC, PSAT, FERR in the last 72 hours.    Medication list  reviewed  Current Facility-Administered Medications   Medication Dose Route Frequency    furosemide (LASIX) tablet 40 mg  40 mg Oral DAILY    docusate sodium (COLACE) capsule 100 mg  100 mg Oral DAILY PRN    prochlorperazine (COMPAZINE) with saline injection 5 mg  5 mg IntraVENous Q6H PRN    methocarbamoL (ROBAXIN) tablet 750 mg  750 mg Oral TID PRN    oxyCODONE IR (ROXICODONE) tablet 5 mg  5 mg Oral Q4H PRN    budesonide (PULMICORT) 250 mcg/2ml nebulizer susp  250 mcg Nebulization BID RT    And    arformoteroL (BROVANA) neb solution 15 mcg  15 mcg Nebulization BID RT    morphine injection 1 mg  1 mg IntraVENous Q4H PRN    melatonin tablet 6 mg  6 mg Oral QHS PRN    metoprolol (LOPRESSOR) injection 5 mg  5 mg IntraVENous Q6H PRN    lidocaine 4 % patch 1 Patch  1 Patch TransDERmal Q24H    sodium chloride (NS) flush 5-40 mL  5-40 mL IntraVENous Q8H    sodium chloride (NS) flush 5-40 mL  5-40 mL IntraVENous PRN    acetaminophen (TYLENOL) tablet 650 mg  650 mg Oral Q6H PRN    Or    acetaminophen (TYLENOL) suppository 650 mg  650 mg Rectal Q6H PRN    polyethylene glycol (MIRALAX) packet 17 g  17 g Oral DAILY PRN    ondansetron (ZOFRAN) injection 4 mg  4 mg IntraVENous Q6H PRN    pantoprazole (PROTONIX) tablet 40 mg  40 mg Oral DAILY    montelukast (SINGULAIR) tablet 10 mg  10 mg Oral QHS    [Held by provider] polyethylene glycol (MIRALAX) packet 17 g  17 g Oral DAILY    rOPINIRole (REQUIP) tablet 3 mg  3 mg Oral TID    glucose chewable tablet 16 g  4 Tablet Oral PRN    dextrose (D50W) injection syrg 12.5-25 g  12.5-25 g IntraVENous PRN    glucagon (GLUCAGEN) injection 1 mg  1 mg IntraMUSCular PRN    atenoloL (TENORMIN) tablet 25 mg  25 mg Oral DAILY    apixaban (ELIQUIS) tablet 2.5 mg  2.5 mg Oral Q12H    albuterol (PROVENTIL VENTOLIN) nebulizer solution 2.5 mg  2.5 mg Nebulization Q4H PRN    insulin lispro (HUMALOG) injection   SubCUTAneous AC&HS    influenza vaccine 2021-22 (6 mos+)(PF) (FLUARIX/FLULAVAL/FLUZONE QUAD) injection 0.5 mL  1 Each IntraMUSCular PRIOR TO DISCHARGE          Maribel Calzada MD [facility]  [facility]  [address], [address]  [place], [address]  Phone - [phone]               Fax - [phone]

## 2021-11-02 NOTE — PROGRESS NOTES
Progress Note    Patient: Guillermo Mckeon MRN: [id number]  SSN: [id number]    YOB: 1927  Age: 80 y.o. Sex: female        ADMITTED:  10/26/2021 to Shane Grullon MD  for Acute respiratory failure with hypoxia [facility]) [J96.01]         Guillermo Mckeon was admitted for Acute respiratory failure with hypoxia ([address] [J96.01]. Chart reviewed. HD stable. Creat improved. Back pain resolved currently   On Eliquis  Blood cx NGTD  Pending hospice decision      Vitals:  Temp (24hrs), Av.9 °F (37.2 °C), Min:98.3 °F (36.8 °C), Max:100 °F (37.8 °C)     Blood pressure (!) 131/55, pulse 92, temperature 98.7 °F (37.1 °C), resp. rate 16, height 5' 4\" (1.626 m), weight 73.3 kg (161 lb 9.6 oz), SpO2 97 %, not currently breastfeeding. I&O's:  10/31 190 -  0700  In: 36 [P.O.:720; I.V.:100]  Out: 1670 [Urine:1670]   No intake/output data recorded. Exam:   NAD. abdomen soft, NT  Voiding independently   On oxygen   Frail, oriented     Labs:   Recent Labs     21  0648 10/31/21  0515   WBC 8.5 10.6   HGB 11.5 12.8   HCT 35.5 38.7   PLT 87* 85*     Recent Labs     21  0648 21  0525 10/31/21  0515   * 136 134*   K 4.1 3.8 4.1   CL 96* 96* 95*   CO2 36* 34* 33*   * 121* 121*   BUN 76* 88* 88*   CREA 1.52* 1.82* 2.36*   CA 9.2 8.7 8.9        Cultures:      Imaging:       Assessment:     - Active Problems:    Acute respiratory failure with hypoxia (HCC) (10/26/2021)      Goals of care, counseling/discussion ()      Constipation, unspecified constipation type ()      Frailty ()    chronic left hydronephrosis- suspect UPJ obstruction    ROCÍO-resolving, multifactorial with CKD and HF, hydro    Plan:     - abd pain resolved.  Voiding well and with kidney function improving (now back to baseline) no urologic intervention   -okay for d/c from uro standpoint  -patient likely d/c soon, pending hospice decision, will hold on arranging follow up until decision is made    Supervising Dr. Uriel HASSAN [facility] By: Christal Rodriguez, GOGO - November 2, 2021

## 2021-11-02 NOTE — PROGRESS NOTES
Progress Note      11/2/2021 7:04 AM  NAME: Roise Goldberg   MRN:  [id number]   Admit Diagnosis: Acute respiratory failure with hypoxia [facility]) [J96.01]                Assessment:       - Acute on chronic systolic chf     - Cath 9236 with mild CAD  - Severe AS s/p TAVR in 2013 with 23mm Chintan, echo 10/2021 EF 35%, mean gradient of 24mm Hg across valve, mild to moderate AI, mild to moderate MR  - PAF on anticoagulation and rate control  - HTN  - CKD  - Macular degneration  - Arthritis  , god son takes care of her, walker/wheelchair  DNR                        Plan:        - Equivicol troponin, manage medically  - Volume overload, diurese  - PAF continue anticoagulation and rate control     - Cont eliquis 2.5mg bid   - Cont atenolol 25mg currently rate controlled  - Holding diovan due to increased cr  - BP low after IV diuresis, Discharge on lasix 40mg po daily     Home once off of O2 vs. Assess for home O2             [x]? High complexity decision making was performed      We discussed the expected course, resolution and complications of the diagnoses in detail. Medication risk, benefits, costs, interactions, and alternatives were discussed as indicated. I advised him to contact the office if his condition worsens, changes or fails to improve as anticipated. Patient expressed understanding with the diagnoses  and plan. Subjective:     Roise Goldberg denies chest pain, dyspnea. Discussed with RN events overnight. Review of Systems:    Symptom Y/N Comments  Symptom Y/N Comments   Fever/Chills N   Chest Pain N    Poor Appetite N   Edema N    Cough N   Abdominal Pain N    Sputum N   Joint Pain N    SOB/MATTA N   Pruritis/Rash N    Nausea/vomit N   Tolerating PT/OT Y    Diarrhea N   Tolerating Diet Y    Constipation N   Other       Could NOT obtain due to:      Objective:      Physical Exam:    Last 24hrs VS reviewed since prior progress note.  Most recent are:    Visit Vitals  BP (!) 146/59 (BP 1 Location: Right upper arm, BP Patient Position: At rest)   Pulse 98   Temp 98.3 °F (36.8 °C)   Resp 14   Ht 5' 4\" (1.626 m)   Wt 73.3 kg (161 lb 9.6 oz)   SpO2 95%   Breastfeeding No   BMI 27.74 kg/m²       Intake/Output Summary (Last 24 hours) at 11/2/2021 7469  Last data filed at 11/1/2021 1840  Gross per 24 hour   Intake 720 ml   Output 1420 ml   Net -700 ml        General Appearance: Well developed, well nourished, alert & oriented x 3,    no acute distress. Ears/Nose/Mouth/Throat: Hearing grossly normal.  Neck: Supple. Chest: Lungs clear to auscultation bilaterally. Cardiovascular: Regular rate and rhythm, S1S2 normal, no murmur. Abdomen: Soft, non-tender, bowel sounds are active. Extremities: No edema bilaterally. Skin: Warm and dry. PMH/ reviewed - no change compared to H&P    Data Review    Telemetry: normal sinus rhythm     Lab Data Personally Reviewed:    Recent Labs     10/31/21  0515   WBC 10.6   HGB 12.8   HCT 38.7   PLT 85*     No results for input(s): INR, PTP, APTT, INREXT, INREXT in the last 72 hours. Recent Labs     11/01/21  0525 10/31/21  0515    134*   K 3.8 4.1   CL 96* 95*   CO2 34* 33*   BUN 88* 88*   CREA 1.82* 2.36*   * 121*   CA 8.7 8.9     No results for input(s): CPK, CKNDX, TROIQ in the last 72 hours. No lab exists for component: CPKMB  No results found for: CHOL, CHOLX, CHLST, CHOLV, HDL, HDLP, LDL, LDLC, DLDLP, TGLX, TRIGL, TRIGP, CHHD, CHHDX    No results for input(s): AP, TBIL, TP, ALB, GLOB, GGT, AML, LPSE in the last 72 hours. No lab exists for component: SGOT, GPT, AMYP, HLPSE  No results for input(s): PH, PCO2, PO2 in the last 72 hours.     Medications Personally Reviewed:    Current Facility-Administered Medications   Medication Dose Route Frequency    furosemide (LASIX) tablet 40 mg  40 mg Oral DAILY    docusate sodium (COLACE) capsule 100 mg  100 mg Oral DAILY PRN    prochlorperazine (COMPAZINE) with saline injection 5 mg  5 mg IntraVENous Q6H PRN    methocarbamoL (ROBAXIN) tablet 750 mg  750 mg Oral TID PRN    oxyCODONE IR (ROXICODONE) tablet 5 mg  5 mg Oral Q4H PRN    budesonide (PULMICORT) 250 mcg/2ml nebulizer susp  250 mcg Nebulization BID RT    And    arformoteroL (BROVANA) neb solution 15 mcg  15 mcg Nebulization BID RT    morphine injection 1 mg  1 mg IntraVENous Q4H PRN    melatonin tablet 6 mg  6 mg Oral QHS PRN    metoprolol (LOPRESSOR) injection 5 mg  5 mg IntraVENous Q6H PRN    lidocaine 4 % patch 1 Patch  1 Patch TransDERmal Q24H    sodium chloride (NS) flush 5-40 mL  5-40 mL IntraVENous Q8H    sodium chloride (NS) flush 5-40 mL  5-40 mL IntraVENous PRN    acetaminophen (TYLENOL) tablet 650 mg  650 mg Oral Q6H PRN    Or    acetaminophen (TYLENOL) suppository 650 mg  650 mg Rectal Q6H PRN    polyethylene glycol (MIRALAX) packet 17 g  17 g Oral DAILY PRN    ondansetron (ZOFRAN) injection 4 mg  4 mg IntraVENous Q6H PRN    pantoprazole (PROTONIX) tablet 40 mg  40 mg Oral DAILY    montelukast (SINGULAIR) tablet 10 mg  10 mg Oral QHS    [Held by provider] polyethylene glycol (MIRALAX) packet 17 g  17 g Oral DAILY    rOPINIRole (REQUIP) tablet 3 mg  3 mg Oral TID    glucose chewable tablet 16 g  4 Tablet Oral PRN    dextrose (D50W) injection syrg 12.5-25 g  12.5-25 g IntraVENous PRN    glucagon (GLUCAGEN) injection 1 mg  1 mg IntraMUSCular PRN    atenoloL (TENORMIN) tablet 25 mg  25 mg Oral DAILY    apixaban (ELIQUIS) tablet 2.5 mg  2.5 mg Oral Q12H    albuterol (PROVENTIL VENTOLIN) nebulizer solution 2.5 mg  2.5 mg Nebulization Q4H PRN    insulin lispro (HUMALOG) injection   SubCUTAneous AC&HS    influenza vaccine 2021-22 (6 mos+)(PF) (FLUARIX/FLULAVAL/FLUZONE QUAD) injection 0.5 mL  1 Each IntraMUSCular PRIOR TO DISCHARGE         Angela Salinas MD

## 2021-11-03 ENCOUNTER — HOSPICE ADMISSION (OUTPATIENT)
Dept: HOSPICE | Facility: HOSPICE | Age: 86
End: 2021-11-03

## 2021-11-03 LAB
ANION GAP SERPL CALC-SCNC: 2 MMOL/L (ref 5–15)
BUN SERPL-MCNC: 68 MG/DL (ref 6–20)
BUN/CREAT SERPL: 52 (ref 12–20)
CALCIUM SERPL-MCNC: 9.4 MG/DL (ref 8.5–10.1)
CHLORIDE SERPL-SCNC: 96 MMOL/L (ref 97–108)
CO2 SERPL-SCNC: 36 MMOL/L (ref 21–32)
CREAT SERPL-MCNC: 1.32 MG/DL (ref 0.55–1.02)
ERYTHROCYTE [DISTWIDTH] IN BLOOD BY AUTOMATED COUNT: 11.9 % (ref 11.5–14.5)
GLUCOSE BLD STRIP.AUTO-MCNC: 124 MG/DL (ref 65–117)
GLUCOSE BLD STRIP.AUTO-MCNC: 146 MG/DL (ref 65–117)
GLUCOSE BLD STRIP.AUTO-MCNC: 148 MG/DL (ref 65–117)
GLUCOSE BLD STRIP.AUTO-MCNC: 225 MG/DL (ref 65–117)
GLUCOSE SERPL-MCNC: 147 MG/DL (ref 65–100)
HCT VFR BLD AUTO: 35 % (ref 35–47)
HGB BLD-MCNC: 11.4 G/DL (ref 11.5–16)
MCH RBC QN AUTO: 31.9 PG (ref 26–34)
MCHC RBC AUTO-ENTMCNC: 32.6 G/DL (ref 30–36.5)
MCV RBC AUTO: 98 FL (ref 80–99)
NRBC # BLD: 0 K/UL (ref 0–0.01)
NRBC BLD-RTO: 0 PER 100 WBC
PLATELET # BLD AUTO: 111 K/UL (ref 150–400)
PMV BLD AUTO: 12 FL (ref 8.9–12.9)
POTASSIUM SERPL-SCNC: 4.2 MMOL/L (ref 3.5–5.1)
RBC # BLD AUTO: 3.57 M/UL (ref 3.8–5.2)
SERVICE CMNT-IMP: ABNORMAL
SODIUM SERPL-SCNC: 134 MMOL/L (ref 136–145)
WBC # BLD AUTO: 8 K/UL (ref 3.6–11)

## 2021-11-03 PROCEDURE — 97530 THERAPEUTIC ACTIVITIES: CPT

## 2021-11-03 PROCEDURE — 74011250637 HC RX REV CODE- 250/637: Performed by: INTERNAL MEDICINE

## 2021-11-03 PROCEDURE — 74011250637 HC RX REV CODE- 250/637: Performed by: NURSE PRACTITIONER

## 2021-11-03 PROCEDURE — 77010033678 HC OXYGEN DAILY

## 2021-11-03 PROCEDURE — 80048 BASIC METABOLIC PNL TOTAL CA: CPT

## 2021-11-03 PROCEDURE — 97110 THERAPEUTIC EXERCISES: CPT

## 2021-11-03 PROCEDURE — 82962 GLUCOSE BLOOD TEST: CPT

## 2021-11-03 PROCEDURE — 74011000250 HC RX REV CODE- 250: Performed by: NURSE PRACTITIONER

## 2021-11-03 PROCEDURE — 74011000250 HC RX REV CODE- 250: Performed by: INTERNAL MEDICINE

## 2021-11-03 PROCEDURE — 94640 AIRWAY INHALATION TREATMENT: CPT

## 2021-11-03 PROCEDURE — 74011250637 HC RX REV CODE- 250/637: Performed by: STUDENT IN AN ORGANIZED HEALTH CARE EDUCATION/TRAINING PROGRAM

## 2021-11-03 PROCEDURE — 97161 PT EVAL LOW COMPLEX 20 MIN: CPT

## 2021-11-03 PROCEDURE — 85027 COMPLETE CBC AUTOMATED: CPT

## 2021-11-03 PROCEDURE — 74011636637 HC RX REV CODE- 636/637: Performed by: NURSE PRACTITIONER

## 2021-11-03 PROCEDURE — 36415 COLL VENOUS BLD VENIPUNCTURE: CPT

## 2021-11-03 PROCEDURE — 99233 SBSQ HOSP IP/OBS HIGH 50: CPT | Performed by: NURSE PRACTITIONER

## 2021-11-03 PROCEDURE — 65270000029 HC RM PRIVATE

## 2021-11-03 RX ADMIN — FUROSEMIDE 40 MG: 40 TABLET ORAL at 08:45

## 2021-11-03 RX ADMIN — ATENOLOL 25 MG: 25 TABLET ORAL at 08:45

## 2021-11-03 RX ADMIN — ROPINIROLE HYDROCHLORIDE 3 MG: 1 TABLET, FILM COATED ORAL at 08:45

## 2021-11-03 RX ADMIN — PANTOPRAZOLE SODIUM 40 MG: 40 TABLET, DELAYED RELEASE ORAL at 08:45

## 2021-11-03 RX ADMIN — MELATONIN 6 MG: at 21:33

## 2021-11-03 RX ADMIN — SODIUM CHLORIDE 10 ML: 9 INJECTION, SOLUTION INTRAMUSCULAR; INTRAVENOUS; SUBCUTANEOUS at 21:34

## 2021-11-03 RX ADMIN — BUDESONIDE 250 MCG: 0.25 INHALANT RESPIRATORY (INHALATION) at 09:22

## 2021-11-03 RX ADMIN — ARFORMOTEROL TARTRATE 15 MCG: 15 SOLUTION RESPIRATORY (INHALATION) at 09:22

## 2021-11-03 RX ADMIN — APIXABAN 2.5 MG: 2.5 TABLET, FILM COATED ORAL at 21:33

## 2021-11-03 RX ADMIN — APIXABAN 2.5 MG: 2.5 TABLET, FILM COATED ORAL at 08:45

## 2021-11-03 RX ADMIN — ARFORMOTEROL TARTRATE 15 MCG: 15 SOLUTION RESPIRATORY (INHALATION) at 21:51

## 2021-11-03 RX ADMIN — BUDESONIDE 250 MCG: 0.25 INHALANT RESPIRATORY (INHALATION) at 21:51

## 2021-11-03 RX ADMIN — MONTELUKAST 10 MG: 10 TABLET, FILM COATED ORAL at 21:33

## 2021-11-03 RX ADMIN — ROPINIROLE HYDROCHLORIDE 3 MG: 1 TABLET, FILM COATED ORAL at 17:55

## 2021-11-03 RX ADMIN — ROPINIROLE HYDROCHLORIDE 3 MG: 1 TABLET, FILM COATED ORAL at 21:32

## 2021-11-03 RX ADMIN — INSULIN LISPRO 4 UNITS: 100 INJECTION, SOLUTION INTRAVENOUS; SUBCUTANEOUS at 12:50

## 2021-11-03 NOTE — PROGRESS NOTES
Transition of Care Plan:     RUR:  14% - low   Disposition: [facility] and Son who provides 24/7 supervision  Follow up appointments: PCP, Specialists  DME needed: Pt has a walker.   Transportation at Discharge: Pt's son will transport.   Oumou Lala or means to access home: Son will provide.   IM Medicare Letter: needed at d/c  Is patient a BCPI-A Bundle: No                  If yes, was Bundle Letter given?:   n/a  Caregiver Contact:Micheal Cornell [phone]  Discharge Caregiver contacted prior to discharge? CM will contact prior to d/c. Initial Note: CM sent hospice referral to [facility] - rec'd call from hospice rep Olga indicating that the pt is out of service area - declined to complete information session. Additional referrals sent to [address] [place] [address] via [address] [employer]. CM will continue to follow for dc planning.      Sydney Saha MSW  Care Manager, [address] [place] [address]

## 2021-11-03 NOTE — PROGRESS NOTES
Hospitalist Progress Note    NAME: James Mcgrath   :  3/7/1927   MRN:  [id number]     Estimated discharge date: 2021  Assessment / Plan:  Acute hypoxic respiratory failure, POA Likely secondary to   Acute congestive heart failure, POA  Cardiogenic pulmonary edema, POA  SIRS (tachycardia and leukocytosis) with no obvious source of infection  Was admitted initially to ICU and transferred to floor same day  Lasix resumed, monitor blood pressure intermittently dropping. ECHO show EF 35-40, elevated pBNP  CXR show pulmonary edema and bilateral effusion  Daily weights, I and O  Steroids stopped as this is not COPD exacerbation  C/w nebulizers  UA negative, CXR doesn't show consolidation  Procal .27, lactate normal  Azithromycin course completed  O2  Home with hospice vs SNF, ready for discharge in AM  DNR, improved, okay to transfer to medical bed     A. fib with RVR  History of paroxysmal A. fib  C/w eliquis  On atenolol   Controlled today     ROCÍO on CKD stage III POA creat 2.43 --> 1.32  Creatinine improving  Likely cardiorenal syndrome + Left hydronephrosis, Urology following  Bladder scan didn't show significant retention  CT abdomen showing left hydronephrosis    Low back pain:  Complains of left low back pain  CT show: 1. Severe progressive left hydronephrosis with neoplasia not excluded. 2. Lumbar degenerative disc disease as detailed. No fracture. Could be d/t hydronephrosis, pain control  CT also showed Hiatal hernia with non obstructive volvulus, however not having any symptoms from this currently. She is aware that she has a large hiatal hernia     Overweight POA Body mass index is 29.2 kg/m².     Code status: DNR  Prophylaxis: Eliquis   Recommended Disposition: Home with home health + hospice     Subjective:     Chief Complaint / Reason for Physician Visit  Follow up for ARF,/Afib RVR  Seen with nephew at bedside, asking about SNF at discharge  No complaints    Review of Systems:  Symptom Y/N Comments  Symptom Y/N Comments   Fever/Chills n   Chest Pain n    Poor Appetite    Edema     Cough    Abdominal Pain n    Sputum n   Joint Pain     SOB/MATTA    Pruritis/Rash     Nausea/vomit n   Tolerating PT/OT     Diarrhea n   Tolerating Diet y    Constipation    Other       Could NOT obtain due to:      Objective:     VITALS:   Last 24hrs VS reviewed since prior progress note. Most recent are:  Patient Vitals for the past 24 hrs:   Temp Pulse Resp BP SpO2   11/03/21 1121 98 °F (36.7 °C) 91 17 119/61 97 %   11/03/21 0923     97 %   11/03/21 0730 98 °F (36.7 °C) 87 19 (!) 112/50 97 %   11/03/21 0518     95 %   11/03/21 0330 98.2 °F (36.8 °C) 86 18 (!) 111/54 96 %   11/02/21 2312 98.1 °F (36.7 °C) 76 16 (!) 119/49 98 %   11/02/21 1900 98 °F (36.7 °C) (!) 102 18 (!) 145/83 97 %       Intake/Output Summary (Last 24 hours) at 11/3/2021 1727  Last data filed at 11/3/2021 0330  Gross per 24 hour   Intake    Output 900 ml   Net -900 ml        I had a face to face encounter and independently examined this patient on 11/3/2021, as outlined below:  PHYSICAL EXAM:  General: WD, WN. Alert, cooperative, no acute distress    EENT:  Anicteric sclerae. MMM  Resp:  No accessory muscle use, few crackles at bases  CV:  Regular  rhythm,  Edema b/l LE L>R  GI:  Soft, Non distended, Non tender. +Bowel sounds  Neurologic:  Alert and oriented X 3, normal speech,   Psych:   Good insight. Not anxious nor agitated  Skin:  No rashes.   No jaundice    Reviewed most current lab test results and cultures  YES  Reviewed most current radiology test results   YES  Review and summation of old records today    NO  Reviewed patient's current orders and MAR    YES  PMH/SH reviewed - no change compared to H&P  ________________________________________________________________________  Care Plan discussed with:    Comments   Patient y    Family      RN y    Care Manager     Consultant                        Multidiciplinary team rounds were held today with , nursing, pharmacist and clinical coordinator. Patient's plan of care was discussed; medications were reviewed and discharge planning was addressed. ________________________________________________________________________      Comments   >50% of visit spent in counseling and coordination of care     ________________________________________________________________________  Martina Rivas MD     Procedures: see electronic medical records for all procedures/Xrays and details which were not copied into this note but were reviewed prior to creation of Plan. LABS:  I reviewed today's most current labs and imaging studies.   Pertinent labs include:  Recent Labs     11/03/21 0517 11/02/21  0648   WBC 8.0 8.5   HGB 11.4* 11.5   HCT 35.0 35.5   * 87*     Recent Labs     11/03/21 0517 11/02/21  0648 11/01/21  0525   * 135* 136   K 4.2 4.1 3.8   CL 96* 96* 96*   CO2 36* 36* 34*   * 135* 121*   BUN 68* 76* 88*   CREA 1.32* 1.52* 1.82*   CA 9.4 9.2 8.7       Signed: Martina Rivas MD

## 2021-11-03 NOTE — PROGRESS NOTES
Nephrology Progress Note  Aquilino Stiles     www. [facility]  Phone - [phone]   Patient: Nam Gold    YOB: 1927        Date- 11/3/2021   Admit Date: 10/26/2021  CC: Follow up for ROCÍO       IMPRESSION & PLAN:   · ROCÍO   · CHF  · Hyponatremia due to chf  · CAD 3b- bl cr 1.5  · Hypertension  · Sob- pulmonary edema  · afib with RVR  ·  Atrophic right kidney  · Left hydronephrosis - chronic  · Chronic Back pain    PLAN-   Lasix 40 mg daily   Hold diovan   Urology input noted   Okay to d/c renal stand point     Subjective: Interval History:   -cr stable  Na 134     Objective:   Vitals:    11/03/21 0518 11/03/21 0626 11/03/21 0730 11/03/21 0923   BP:   (!) 112/50    Pulse:   87    Resp:   19    Temp:   98 °F (36.7 °C)    SpO2: 95%  97% 97%   Weight:  73.6 kg (162 lb 4.8 oz)     Height:          11/02 0701 - 11/03 0700  In: 720 [P.O.:720]  Out: 900 [Urine:900]  Last 3 Recorded Weights in this Encounter    10/28/21 1221 10/30/21 0520 11/03/21 0626   Weight: 76.7 kg (169 lb) 73.3 kg (161 lb 9.6 oz) 73.6 kg (162 lb 4.8 oz)      Physical exam:   GEN: NAD  NECK- Supple, no mass  RESP: No wheezing, decreased air movement b/l  CVS: S1,S2  , rrr  EXT: + edema  PSYCH:Can't access due to patient's current condition       Chart reviewed. Pertinent Notes reviewed. Data Review :  Recent Labs     11/03/21 0517 11/02/21  0648 11/01/21  0525   * 135* 136   K 4.2 4.1 3.8   CL 96* 96* 96*   CO2 36* 36* 34*   BUN 68* 76* 88*   CREA 1.32* 1.52* 1.82*   * 135* 121*   CA 9.4 9.2 8.7     Recent Labs     11/03/21  0517 11/02/21  0648   WBC 8.0 8.5   HGB 11.4* 11.5   HCT 35.0 35.5   * 87*     No results for input(s): FE, TIBC, PSAT, FERR in the last 72 hours.    Medication list  reviewed  Current Facility-Administered Medications   Medication Dose Route Frequency    furosemide (LASIX) tablet 40 mg  40 mg Oral DAILY    docusate sodium (COLACE) capsule 100 mg 100 mg Oral DAILY PRN    prochlorperazine (COMPAZINE) with saline injection 5 mg  5 mg IntraVENous Q6H PRN    methocarbamoL (ROBAXIN) tablet 750 mg  750 mg Oral TID PRN    oxyCODONE IR (ROXICODONE) tablet 5 mg  5 mg Oral Q4H PRN    budesonide (PULMICORT) 250 mcg/2ml nebulizer susp  250 mcg Nebulization BID RT    And    arformoteroL (BROVANA) neb solution 15 mcg  15 mcg Nebulization BID RT    morphine injection 1 mg  1 mg IntraVENous Q4H PRN    melatonin tablet 6 mg  6 mg Oral QHS PRN    metoprolol (LOPRESSOR) injection 5 mg  5 mg IntraVENous Q6H PRN    lidocaine 4 % patch 1 Patch  1 Patch TransDERmal Q24H    sodium chloride (NS) flush 5-40 mL  5-40 mL IntraVENous Q8H    sodium chloride (NS) flush 5-40 mL  5-40 mL IntraVENous PRN    acetaminophen (TYLENOL) tablet 650 mg  650 mg Oral Q6H PRN    Or    acetaminophen (TYLENOL) suppository 650 mg  650 mg Rectal Q6H PRN    polyethylene glycol (MIRALAX) packet 17 g  17 g Oral DAILY PRN    ondansetron (ZOFRAN) injection 4 mg  4 mg IntraVENous Q6H PRN    pantoprazole (PROTONIX) tablet 40 mg  40 mg Oral DAILY    montelukast (SINGULAIR) tablet 10 mg  10 mg Oral QHS    [Held by provider] polyethylene glycol (MIRALAX) packet 17 g  17 g Oral DAILY    rOPINIRole (REQUIP) tablet 3 mg  3 mg Oral TID    glucose chewable tablet 16 g  4 Tablet Oral PRN    dextrose (D50W) injection syrg 12.5-25 g  12.5-25 g IntraVENous PRN    glucagon (GLUCAGEN) injection 1 mg  1 mg IntraMUSCular PRN    atenoloL (TENORMIN) tablet 25 mg  25 mg Oral DAILY    apixaban (ELIQUIS) tablet 2.5 mg  2.5 mg Oral Q12H    albuterol (PROVENTIL VENTOLIN) nebulizer solution 2.5 mg  2.5 mg Nebulization Q4H PRN    insulin lispro (HUMALOG) injection   SubCUTAneous AC&HS    influenza vaccine 2021-22 (6 mos+)(PF) (FLUARIX/FLULAVAL/FLUZONE QUAD) injection 0.5 mL  1 Each IntraMUSCular PRIOR TO DISCHARGE          Ace Jose MD              [facility]  [facility] [address] [facility], [address] [place], [address]  Phone - [phone]               Fax - [phone]

## 2021-11-03 NOTE — PROGRESS NOTES
Problem: Mobility Impaired (Adult and Pediatric)  Goal: *Acute Goals and Plan of Care (Insert Text)  Description: FUNCTIONAL STATUS PRIOR TO ADMISSION: Mod I with use of RW. Denies history of falls. Denies home O2 use. God son assists with meal prep and ADLs as needed    HOME SUPPORT PRIOR TO ADMISSION: The patient lived with God son who provides 24hr assist.    Physical Therapy Goals  Initiated 11/3/2021  1. Patient will move from supine to sit and sit to supine , scoot up and down, and roll side to side in bed with minimal assistance/contact guard assist within 7 day(s). 2.  Patient will transfer from bed to chair and chair to bed with minimal assistance/contact guard assist using the least restrictive device within 7 day(s). 3.  Patient will perform sit to stand with minimal assistance/contact guard assist within 7 day(s). 4.  Patient will ambulate with minimal assistance/contact guard assist for 30 feet with the least restrictive device within 7 day(s). Outcome: Progressing Towards Goal   PHYSICAL THERAPY EVALUATION  Patient: Jose Alberto Barefoot (76 y.o. female)  Date: 11/3/2021  Primary Diagnosis: Acute respiratory failure with hypoxia [facility] [J96.01]        Precautions:   Aspiration, Fall, Skin    ASSESSMENT  Based on the objective data described below, the patient presents with generalized weakness, impaired activity tolerance, impaired sitting and standing balance, MATTA, kyphosis/scoliosis? and overall impaired functional mobility. Pt A&O, agreeable and motivated for participation with therapy. Pt required modAx1-2 throughout all mobility, including bed mobility and sit<>stand transfer. Pt able to assume standing position however unable to advance BLEs despite RW support and minAx2. Pt fatigued quickly with activity and was returned to supine position in bed. VSS on 2L O2 throughout.  Noted that pt and family are scheduled to meet with hospice this afternoon however pt extremely motivated to regain prior level of function and decrease caregiver burden at discharge. Current Level of Function Impacting Discharge (mobility/balance): modAx1-2 for all mobility, unable to ambulate    Functional Outcome Measure: The patient scored 20/100 on the Barthel Index outcome measure which is indicative of significant impairment in ADLs and functional mobility. Other factors to consider for discharge: hospice? Caregiver burden, falls risk     Patient will benefit from skilled therapy intervention to address the above noted impairments. PLAN :  Recommendations and Planned Interventions: bed mobility training, transfer training, gait training, therapeutic exercises, patient and family training/education, and therapeutic activities      Frequency/Duration: Patient will be followed by physical therapy:  3 times a week to address goals. Recommendation for discharge: (in order for the patient to meet his/her long term goals)  To be determined: noted pt/family scheduled for hospice session    This discharge recommendation:  Has been made in collaboration with the attending provider and/or case management    IF patient discharges home will need the following DME: patient owns DME required for discharge         SUBJECTIVE:   Patient stated It feels so good to sit up! Segun Vee    OBJECTIVE DATA SUMMARY:   HISTORY:    Past Medical History:   Diagnosis Date    CAD (coronary artery disease)     CKD (chronic kidney disease)     Hypertension    No past surgical history on file.     Personal factors and/or comorbidities impacting plan of care:     Home Situation  Home Environment: Private residence  # Steps to Enter: 3  Rails to Enter: Yes  Hand Rails : Bilateral  Wheelchair Ramp: No  One/Two Story Residence: Two story, live on 1st floor  Living Alone: No  Support Systems: Other Family Member(s) (god son)  Patient Expects to be Discharged to[de-identified] House  Current DME Used/Available at Home: Gina Felton, Aquilino Negrete, Wheelchair, Gina Purnima, rollator, Raised toilet seat, Grab bars  Tub or Shower Type: Shower    EXAMINATION/PRESENTATION/DECISION MAKING:   Critical Behavior:  Neurologic State: Alert, Appropriate for age  Orientation Level: Oriented X4  Cognition: Follows commands  Safety/Judgement: Awareness of environment  Hearing: Auditory  Auditory Impairment: Hard of hearing, bilateral  Skin:  intact  Edema: none noted  Range Of Motion:  AROM: Generally decreased, functional                       Strength:    Strength: Generally decreased, functional                    Tone & Sensation:   Tone: Normal              Sensation: Intact               Coordination:  Coordination: Generally decreased, functional  Vision:      Functional Mobility:  Bed Mobility:  Rolling: Moderate assistance; Assist x1  Supine to Sit: Moderate assistance; Assist x1     Scooting: Total assistance  Transfers:  Sit to Stand: Moderate assistance; Assist x1  Stand to Sit: Minimum assistance                       Balance:   Sitting: Impaired  Sitting - Static: Fair (occasional)  Sitting - Dynamic: Fair (occasional); Poor (constant support)  Standing: Impaired; With support (RW)  Standing - Static: Fair; Constant support  Standing - Dynamic : Fair; Constant support  Ambulation/Gait Training:               Ambulation did not occur. Functional Measure:  Barthel Index:    Bathin  Bladder: 0  Bowels: 5  Groomin  Dressin  Feedin  Mobility: 0  Stairs: 0  Toilet Use: 0  Transfer (Bed to Chair and Back): 5  Total: 20/100       The Barthel ADL Index: Guidelines  1. The index should be used as a record of what a patient does, not as a record of what a patient could do. 2. The main aim is to establish degree of independence from any help, physical or verbal, however minor and for whatever reason. 3. The need for supervision renders the patient not independent.   4. A patient's performance should be established using the best available evidence. Asking the patient, friends/relatives and nurses are the usual sources, but direct observation and common sense are also important. However direct testing is not needed. 5. Usually the patient's performance over the preceding 24-48 hours is important, but occasionally longer periods will be relevant. 6. Middle categories imply that the patient supplies over 50 per cent of the effort. 7. Use of aids to be independent is allowed. Lc Brown., Barthel, D.W. (1534). Functional evaluation: the Barthel Index. [facility] (14)2. Elvina Gaucher more DAVID Romeo, Charmaine Gonzalez., Dileep Floress., [place], [address] (). Measuring the change indisability after inpatient rehabilitation; comparison of the responsiveness of the Barthel Index and Functional [place] Measure. Journal of Neurology, Neurosurgery, and Psychiatry, 66(4), 903-475. ANTHONY Wilson, GONZÁLEZ Mueller, & Jose Espinoza MEARNESTINE. (2004.) Assessment of post-stroke quality of life in cost-effectiveness studies: The usefulness of the Barthel Index and the EuroQoL-5D.  Quality of Life Research, 15, 439-92           Physical Therapy Evaluation Charge Determination   History Examination Presentation Decision-Making   MEDIUM  Complexity : 1-2 comorbidities / personal factors will impact the outcome/ POC  MEDIUM Complexity : 3 Standardized tests and measures addressing body structure, function, activity limitation and / or participation in recreation  MEDIUM Complexity : Evolving with changing characteristics  MEDIUM Complexity : FOTO score of 26-74      Based on the above components, the patient evaluation is determined to be of the following complexity level: MEDIUM    Pain Rating:  Denied c/o pain    Activity Tolerance:   Fair, VSS on 2L O2 however pt fatigues quickly    After treatment patient left in no apparent distress:   Supine in bed, Heels elevated for pressure relief, Call bell within reach, and Side rails x 3    COMMUNICATION/EDUCATION: The patients plan of care was discussed with: Registered nurse. Fall prevention education was provided and the patient/caregiver indicated understanding., Patient/family have participated as able in goal setting and plan of care. , and Patient/family agree to work toward stated goals and plan of care.     Thank you for this referral.  Nora Romero, PT, DPT   Time Calculation: 34 mins

## 2021-11-03 NOTE — HOSPICE
[facility] RN note:  Consult noted. When reviewing chart, noted that pt lives in [address] ([address]) [place] [address] [employer] service area. Notified CM Trav Zayas who sent referral to alternate agency that is able to serve family. [address] [employer] remains available to assist with transition of care if needed. Thank you for the opportunity to care for this pt and family. Please contact hospice at [phone] with any questions or concerns.

## 2021-11-03 NOTE — PROGRESS NOTES
Progress Note      11/3/2021 7:04 AM  NAME: Sheila Ball   MRN:  [id number]   Admit Diagnosis: Acute respiratory failure with hypoxia [facility]) [J96.01]                Assessment:       - Acute on chronic systolic chf     - Cath 9943 with mild CAD  - Severe AS s/p TAVR in 2013 with 23mm Chintan, echo 10/2021 EF 35%, mean gradient of 24mm Hg across valve, mild to moderate AI, mild to moderate MR  - PAF on anticoagulation and rate control  - HTN  - CKD  - Macular degneration  - Arthritis  , god son takes care of her, walker/wheelchair  DNR                        Plan:        - Equivicol troponin, manage medically  - Volume overload, diurese  - PAF continue anticoagulation and rate control     - Cont eliquis 2.5mg bid   - Cont atenolol 25mg currently rate controlled  - Holding diovan due to increased cr, bp controlled. - Cont added lasix 40mg po daily     ? Home O2  Possible home hospice, vs snf with hospice    Will sign off, please call with cardiology concerns.             [x]? High complexity decision making was performed      We discussed the expected course, resolution and complications of the diagnoses in detail. Medication risk, benefits, costs, interactions, and alternatives were discussed as indicated. I advised him to contact the office if his condition worsens, changes or fails to improve as anticipated. Patient expressed understanding with the diagnoses  and plan. Subjective:     Sheila Ball denies chest pain, dyspnea. Discussed with RN events overnight.      Review of Systems:    Symptom Y/N Comments  Symptom Y/N Comments   Fever/Chills N   Chest Pain N    Poor Appetite N   Edema N    Cough N   Abdominal Pain N    Sputum N   Joint Pain N    SOB/MATTA N   Pruritis/Rash N    Nausea/vomit N   Tolerating PT/OT Y    Diarrhea N   Tolerating Diet Y    Constipation N   Other       Could NOT obtain due to:      Objective:      Physical Exam:    Last 24hrs VS reviewed since prior progress note. Most recent are:    Visit Vitals  /61 (BP 1 Location: Left arm)   Pulse 91   Temp 98 °F (36.7 °C)   Resp 17   Ht 5' 4\" (1.626 m)   Wt 73.6 kg (162 lb 4.8 oz)   SpO2 97%   Breastfeeding No   BMI 27.86 kg/m²       Intake/Output Summary (Last 24 hours) at 11/3/2021 1600  Last data filed at 11/3/2021 0330  Gross per 24 hour   Intake 240 ml   Output 900 ml   Net -660 ml        General Appearance: Well developed, well nourished, alert & oriented x 3,    no acute distress. Ears/Nose/Mouth/Throat: Hearing grossly normal.  Neck: Supple. Chest: Lungs clear to auscultation bilaterally. Cardiovascular: Regular rate and rhythm, S1S2 normal, no murmur. Abdomen: Soft, non-tender, bowel sounds are active. Extremities: No edema bilaterally. Skin: Warm and dry. PMH/SH reviewed - no change compared to H&P    Data Review    Telemetry: normal sinus rhythm     Lab Data Personally Reviewed:    Recent Labs     11/03/21 0517 11/02/21  0648   WBC 8.0 8.5   HGB 11.4* 11.5   HCT 35.0 35.5   * 87*     No results for input(s): INR, PTP, APTT, INREXT, INREXT in the last 72 hours. Recent Labs     11/03/21 0517 11/02/21  0648 11/01/21  0525   * 135* 136   K 4.2 4.1 3.8   CL 96* 96* 96*   CO2 36* 36* 34*   BUN 68* 76* 88*   CREA 1.32* 1.52* 1.82*   * 135* 121*   CA 9.4 9.2 8.7     No results for input(s): CPK, CKNDX, TROIQ in the last 72 hours. No lab exists for component: CPKMB  No results found for: CHOL, CHOLX, CHLST, CHOLV, HDL, HDLP, LDL, LDLC, DLDLP, TGLX, TRIGL, TRIGP, CHHD, CHHDX    No results for input(s): AP, TBIL, TP, ALB, GLOB, GGT, AML, LPSE in the last 72 hours. No lab exists for component: SGOT, GPT, AMYP, HLPSE  No results for input(s): PH, PCO2, PO2 in the last 72 hours.     Medications Personally Reviewed:    Current Facility-Administered Medications   Medication Dose Route Frequency    furosemide (LASIX) tablet 40 mg  40 mg Oral DAILY    docusate sodium (COLACE) capsule 100 mg 100 mg Oral DAILY PRN    prochlorperazine (COMPAZINE) with saline injection 5 mg  5 mg IntraVENous Q6H PRN    methocarbamoL (ROBAXIN) tablet 750 mg  750 mg Oral TID PRN    oxyCODONE IR (ROXICODONE) tablet 5 mg  5 mg Oral Q4H PRN    budesonide (PULMICORT) 250 mcg/2ml nebulizer susp  250 mcg Nebulization BID RT    And    arformoteroL (BROVANA) neb solution 15 mcg  15 mcg Nebulization BID RT    morphine injection 1 mg  1 mg IntraVENous Q4H PRN    melatonin tablet 6 mg  6 mg Oral QHS PRN    metoprolol (LOPRESSOR) injection 5 mg  5 mg IntraVENous Q6H PRN    lidocaine 4 % patch 1 Patch  1 Patch TransDERmal Q24H    sodium chloride (NS) flush 5-40 mL  5-40 mL IntraVENous Q8H    sodium chloride (NS) flush 5-40 mL  5-40 mL IntraVENous PRN    acetaminophen (TYLENOL) tablet 650 mg  650 mg Oral Q6H PRN    Or    acetaminophen (TYLENOL) suppository 650 mg  650 mg Rectal Q6H PRN    polyethylene glycol (MIRALAX) packet 17 g  17 g Oral DAILY PRN    ondansetron (ZOFRAN) injection 4 mg  4 mg IntraVENous Q6H PRN    pantoprazole (PROTONIX) tablet 40 mg  40 mg Oral DAILY    montelukast (SINGULAIR) tablet 10 mg  10 mg Oral QHS    [Held by provider] polyethylene glycol (MIRALAX) packet 17 g  17 g Oral DAILY    rOPINIRole (REQUIP) tablet 3 mg  3 mg Oral TID    glucose chewable tablet 16 g  4 Tablet Oral PRN    dextrose (D50W) injection syrg 12.5-25 g  12.5-25 g IntraVENous PRN    glucagon (GLUCAGEN) injection 1 mg  1 mg IntraMUSCular PRN    atenoloL (TENORMIN) tablet 25 mg  25 mg Oral DAILY    apixaban (ELIQUIS) tablet 2.5 mg  2.5 mg Oral Q12H    albuterol (PROVENTIL VENTOLIN) nebulizer solution 2.5 mg  2.5 mg Nebulization Q4H PRN    insulin lispro (HUMALOG) injection   SubCUTAneous AC&HS    influenza vaccine 2021-22 (6 mos+)(PF) (FLUARIX/FLULAVAL/FLUZONE QUAD) injection 0.5 mL  1 Each IntraMUSCular PRIOR TO DISCHARGE         Flavio Ribeiro MD

## 2021-11-03 NOTE — PROGRESS NOTES
Palliative Medicine Consult  [place]: [id number] ([id number])    Patient Name: Lois Eid  YOB: 1927    Date of Initial Consult: 10/31/21  Reason for Consult: Care Decisions  Requesting Provider: Joey Frazier MD   Primary Care Physician: Sadie Mason MD     SUMMARY:   Lois Eid is a 80 y. o. with a past history of CHF,DM, Afib, CKD stage III, HLD, TAVR  who was admitted on 10/26/2021 from home with a diagnosis of acute respiratory failure. Current medical issues leading to Palliative Medicine involvement include: Care decisions. Social: Lives with god son [place] in [place], [place]. PALLIATIVE DIAGNOSES:   1. Acute Respiratory Failure   2. Goals of Care  3. Constipation  4. Fraility       PLAN:   1. Prior to visit, I completed a  review of patient's medical records, including medical documentation, vital signs, MARs, and results of various labs and other diagnostics. I also spoke with patient's nurse, Haider Saucedo RN and [facility]. 2. Entered patient room her god son and caretaker Aquilino Banegas and nephew Sherwin Mondragon were both present. Patient was resting comfortably in bed. [facility] met with patient and family and provided Dasia Saha with numbers of two hospice companies in the area in which they live to set up an appointment however, they including the patient are hesitant at this time and felt she was more a candidate for rehab at a SNF then hopefully return home. They want her to get the best care and are okay if she goes to a rehab in [place] instead of close to home if it means she will get better care. She was able to work some with PT/OT earlier and feels that she has potential to get stronger at this time and return home to somewhat of a baseline. Spoke to care manager Marky Brewster and updated her with patient and family's request moving forward.  Aquilino Banegas her god son brought a copy of her AMD which I labeled and placed in her chart as well as updated contacts having Dasia Saha (nephew) her mPOA and Traci Hogan is the caretaker. 3. Will follow up tomorrow to have patient sign DDNR. 4. Initial consult note routed to primary continuity provider and/or primary health care team members  5. Communicated plan of care with: Palliative [facility] VAL[facility] Team     GOALS OF CARE / TREATMENT PREFERENCES:     GOALS OF CARE:       TREATMENT PREFERENCES:   Code Status: DNR          Advance Care Planning:  [x] [facility] Interdisciplinary Team has updated the ACP Navigator with Health Care Decision Maker and Patient Capacity      Advance Care Planning 10/26/2021   Patient's Healthcare Decision Maker is: (No Data)   Confirm Advance Directive Yes, not on file               Other:    As far as possible, the palliative care team has discussed with patient / health care proxy about goals of care / treatment preferences for patient. HISTORY:     History obtained from: Chart  CHIEF COMPLAINT: 79 yo  female with past medical history of CHF,DM, Afib, CKD stage III, HLD, TAVR who was admitted on 10/26/21 from [address] [place] [address] with complaints of shortness of breath. She had been having increasing shortness of breath for the last 2 weeks with bilateral lower extremity edema per Traci Hogan. Her oxygen saturations were in the mid 80s that improved on NRB. She continued to exhibit increased WOB and was placed on BiPAP. She was given lasix, methylpred, levofloxacin, and ceftriaxone. She was then transferred to [facility] [place] [facility] for further workup and management.   HPI/SUBJECTIVE:    The patient is:   [x] Verbal and participatory  [] Non-participatory due to:       Clinical Pain Assessment (nonverbal scale for severity on nonverbal patients):   Clinical Pain Assessment  Severity: 0          Duration: for how long has pt been experiencing pain (e.g., 2 days, 1 month, years)  Frequency: how often pain is an issue (e.g., several times per day, once every few days, constant)     FUNCTIONAL ASSESSMENT:     Palliative Performance Scale (PPS):  PPS: 50       PSYCHOSOCIAL/SPIRITUAL SCREENING:     Palliative IDT has assessed this patient for cultural preferences / practices and a referral made as appropriate to needs (Cultural Services, Patient Advocacy, Ethics, etc.)    Any spiritual / Judaism concerns:  [] Yes /  [x] No    Caregiver Burnout:  [] Yes /  [x] No /  [] No Caregiver Present      Anticipatory grief assessment:   [x] Normal  / [] Maladaptive       ESAS Anxiety: Anxiety: 0    ESAS Depression: Depression: 0        REVIEW OF SYSTEMS:     Positive and pertinent negative findings in ROS are noted above in HPI. The following systems were [x] reviewed / [] unable to be reviewed as noted in HPI  Other findings are noted below. Systems: constitutional, ears/nose/mouth/throat, respiratory, gastrointestinal, genitourinary, musculoskeletal, integumentary, neurologic, psychiatric, endocrine. Positive findings noted below. Modified ESAS Completed by: provider   Fatigue: 3 Drowsiness: 0   Depression: 0 Pain: 0   Anxiety: 0 Nausea: 3   Anorexia: 0 Dyspnea: 1     Constipation: Yes     Stool Occurrence(s): 1        PHYSICAL EXAM:     From RN flowsheet:  Wt Readings from Last 3 Encounters:   11/03/21 162 lb 4.8 oz (73.6 kg)     Blood pressure 119/61, pulse 91, temperature 98 °F (36.7 °C), resp. rate 17, height 5' 4\" (1.626 m), weight 162 lb 4.8 oz (73.6 kg), SpO2 97 %, not currently breastfeeding.     Pain Scale 1: Numeric (0 - 10)  Pain Intensity 1: 0  Pain Onset 1: acute  Pain Location 1: Back  Pain Orientation 1: Lower  Pain Description 1: Aching  Pain Intervention(s) 1: Declines, Back rub, Warm pack, Other (comment) (Kpad)  Last bowel movement, if known: 10/28/21    Constitutional: Alert, pleasant, NAD  Eyes: PERRLA, anicteric  ENMT: no nasal discharge, moist mucous membranes  Cardiovascular: regular rhythm, IVCD distal pulses intact  Respiratory: breathing not labored, symmetric, anterior lungs CTA, posterior lung fields diminished  Gastrointestinal: soft non-tender, +bowel sounds  Musculoskeletal: no deformity, no tenderness to palpation  Skin: warm, dry  Neurologic: Alert and oriented x4, following commands, moving all extremities  Psychiatric: full affect, no hallucinations         HISTORY:     Active Problems:    Acute respiratory failure with hypoxia ([address] (10/26/2021)      Goals of care, counseling/discussion ()      Constipation, unspecified constipation type ()      Frailty ()      Past Medical History:   Diagnosis Date    CAD (coronary artery disease)     CKD (chronic kidney disease)     Hypertension       No past surgical history on file. No family history on file. History reviewed, no pertinent family history.   Social History     Tobacco Use    Smoking status: Not on file    Smokeless tobacco: Not on file   Substance Use Topics    Alcohol use: Not on file     Allergies   Allergen Reactions    Pcn [Penicillins] Not Reported This Time     Tolerated ceftriaxone 10/2021    Sulfa (Sulfonamide Antibiotics) Not Reported This Time      Current Facility-Administered Medications   Medication Dose Route Frequency    furosemide (LASIX) tablet 40 mg  40 mg Oral DAILY    docusate sodium (COLACE) capsule 100 mg  100 mg Oral DAILY PRN    prochlorperazine (COMPAZINE) with saline injection 5 mg  5 mg IntraVENous Q6H PRN    methocarbamoL (ROBAXIN) tablet 750 mg  750 mg Oral TID PRN    oxyCODONE IR (ROXICODONE) tablet 5 mg  5 mg Oral Q4H PRN    budesonide (PULMICORT) 250 mcg/2ml nebulizer susp  250 mcg Nebulization BID RT    And    arformoteroL (BROVANA) neb solution 15 mcg  15 mcg Nebulization BID RT    morphine injection 1 mg  1 mg IntraVENous Q4H PRN    melatonin tablet 6 mg  6 mg Oral QHS PRN    metoprolol (LOPRESSOR) injection 5 mg  5 mg IntraVENous Q6H PRN    lidocaine 4 % patch 1 Patch  1 Patch TransDERmal Q24H    sodium chloride (NS) flush 5-40 mL  5-40 mL IntraVENous Q8H    sodium chloride (NS) flush 5-40 mL 5-40 mL IntraVENous PRN    acetaminophen (TYLENOL) tablet 650 mg  650 mg Oral Q6H PRN    Or    acetaminophen (TYLENOL) suppository 650 mg  650 mg Rectal Q6H PRN    polyethylene glycol (MIRALAX) packet 17 g  17 g Oral DAILY PRN    ondansetron (ZOFRAN) injection 4 mg  4 mg IntraVENous Q6H PRN    pantoprazole (PROTONIX) tablet 40 mg  40 mg Oral DAILY    montelukast (SINGULAIR) tablet 10 mg  10 mg Oral QHS    [Held by provider] polyethylene glycol (MIRALAX) packet 17 g  17 g Oral DAILY    rOPINIRole (REQUIP) tablet 3 mg  3 mg Oral TID    glucose chewable tablet 16 g  4 Tablet Oral PRN    dextrose (D50W) injection syrg 12.5-25 g  12.5-25 g IntraVENous PRN    glucagon (GLUCAGEN) injection 1 mg  1 mg IntraMUSCular PRN    atenoloL (TENORMIN) tablet 25 mg  25 mg Oral DAILY    apixaban (ELIQUIS) tablet 2.5 mg  2.5 mg Oral Q12H    albuterol (PROVENTIL VENTOLIN) nebulizer solution 2.5 mg  2.5 mg Nebulization Q4H PRN    insulin lispro (HUMALOG) injection   SubCUTAneous AC&HS    influenza vaccine 2021-22 (6 mos+)(PF) (FLUARIX/FLULAVAL/FLUZONE QUAD) injection 0.5 mL  1 Each IntraMUSCular PRIOR TO DISCHARGE          LAB AND IMAGING FINDINGS:     Lab Results   Component Value Date/Time    WBC 8.0 11/03/2021 05:17 AM    HGB 11.4 (L) 11/03/2021 05:17 AM    PLATELET 113 (L) 06/95/7701 05:17 AM     Lab Results   Component Value Date/Time    Sodium 134 (L) 11/03/2021 05:17 AM    Potassium 4.2 11/03/2021 05:17 AM    Chloride 96 (L) 11/03/2021 05:17 AM    CO2 36 (H) 11/03/2021 05:17 AM    BUN 68 (H) 11/03/2021 05:17 AM    Creatinine 1.32 (H) 11/03/2021 05:17 AM    Calcium 9.4 11/03/2021 05:17 AM    Magnesium 2.4 10/27/2021 04:34 AM    Phosphorus 4.9 (H) 10/26/2021 01:36 AM      Lab Results   Component Value Date/Time    Alk.  phosphatase 65 10/28/2021 09:04 AM    Protein, total 6.6 10/28/2021 09:04 AM    Albumin 3.1 (L) 10/28/2021 09:04 AM    Globulin 3.5 10/28/2021 09:04 AM     Lab Results   Component Value Date/Time INR 1.1 10/26/2021 01:36 AM    Prothrombin time 11.9 (H) 10/26/2021 01:36 AM    aPTT 20.4 (L) 10/26/2021 01:36 AM      No results found for: IRON, FE, TIBC, IBCT, PSAT, FERR   No results found for: PH, PCO2, PO2  No components found for: GLPOC   No results found for: CPK, CKMB             Total time: 35  Counseling / coordination time, spent as noted above: 20  > 50% counseling / coordination?: y    Prolonged service was provided for  []30 min   []75 min in face to face time in the presence of the patient, spent as noted above. Time Start:   Time End:   Note: this can only be billed with 48108 (initial) or 46809 (follow up). If multiple start / stop times, list each separately.

## 2021-11-03 NOTE — PROGRESS NOTES
Problem: Respiratory Function Compromised - NIV  Goal: Able to breathe comfortably (NIV)  Outcome: Progressing Towards Goal

## 2021-11-03 NOTE — PROGRESS NOTES
1900- Bedside shift change report given to Doroteo Herzog RN (oncoming nurse) by Eleazar Romano RN (offgoing nurse). Report included the following information SBAR, Kardex, ED Summary, Intake/Output, MAR, Accordion, Recent Results, Med Rec Status and Cardiac Rhythm A fib.     0700- End of Shift Note    Bedside shift change report given to JESUS Desir (oncoming nurse) by Erum Long RN (offgoing nurse). Report included the following information SBAR, Kardex, ED Summary, Intake/Output, MAR, Recent Results, Med Rec Status and Cardiac Rhythm  A fib    Shift worked:  7p-7a     Shift summary and any significant changes:     No acute changes overnight. Concerns for physician to address:       Zone phone for oncoming shift:          Activity:  Activity Level: Bed Rest  Number times ambulated in hallways past shift: 0  Number of times OOB to chair past shift: 0    Cardiac:   Cardiac Monitoring: Yes      Cardiac Rhythm: Atrial Fib    Access:   Current line(s): PIV     Genitourinary:   Urinary status: voiding and external catheter    Respiratory:   O2 Device: Nasal cannula  Chronic home O2 use?: NO  Incentive spirometer at bedside: YES     GI:  Last Bowel Movement Date: 10/26/21  Current diet:  ADULT DIET Regular; 3 carb choices (45 gm/meal); No Salt Added (3-4 gm)  ADULT ORAL NUTRITION SUPPLEMENT Lunch, Dinner, Breakfast; Diabetic Supplement  Passing flatus: YES  Tolerating current diet: YES       Pain Management:   Patient states pain is manageable on current regimen: YES    Skin:  Malik Score: 16  Interventions: turn team, speciality bed, float heels, PT/OT consult and internal/external urinary devices    Patient Safety:  Fall Score:  Total Score: 3  Interventions: bed/chair alarm, assistive device (walker, cane, etc), gripper socks and pt to call before getting OOB  High Fall Risk: Yes    Length of Stay:  Expected LOS: 4d 19h  Actual LOS: [address], RN

## 2021-11-03 NOTE — PROGRESS NOTES
Problem: Self Care Deficits Care Plan (Adult)  Goal: *Acute Goals and Plan of Care (Insert Text)  Description: FUNCTIONAL STATUS PRIOR TO ADMISSION: Patient was modified independent using a rollator for functional mobility. HOME SUPPORT: The patient lived with god son who is available to assist.     Initial Occupational Therapy Goals (11/2/2021) Within 7 day(s):  1. Patient will perform grooming seated EOB with minimal assist for increased independence with ADLs. 2. Patient will perform UB dressing with minimal assist for increased independence with ADLs. 3. Patient will perform LB dressing with moderate assist & A/E PRN for increased independence with ADLs. 4. Patient will perform all aspects of toileting with moderate assist for increased independence in ADLs  5. Patient will independently apply energy conservation techniques with 1 verbal cue(s) for increased independence with ADLs. 6. Patient will utilize good body mechanics during ADLs with 1 verbal cue(s). Outcome: Progressing Towards Goal   OCCUPATIONAL THERAPY TREATMENT  Patient: Marcelo Cook (87 y.o. female)  Date: 11/3/2021  Diagnosis: Acute respiratory failure with hypoxia ([facility] [address] [J96.01]   <principal problem not specified>       Precautions: Aspiration, Fall, Skin  Chart, occupational therapy assessment, plan of care, and goals were reviewed. ASSESSMENT  Patient continues with skilled OT services and is progressing towards goals. Patient in bed with fatigue after lunch, but agreeable to B UE exercises, per family member patient not able to feed self, working on AROM to improve self feeding independence. Continue to be below baseline, tolerated minimal session due to fatigue, recommend SNF then home with family pending patient and families wishes.      Current Level of Function Impacting Discharge (ADLs): total A for self feeding this date due to fatigue    Other factors to consider for discharge: son stating they are hoping for rehab then home with him         PLAN :  Patient continues to benefit from skilled intervention to address the above impairments. Continue treatment per established plan of care to address goals. Recommend with staff: BSC for toileting    Recommend next OT session: BSC transfer retraining; HEP B UE AROM handout    Recommendation for discharge: (in order for the patient to meet his/her long term goals)  Therapy up to 5 days/week in SNF setting    This discharge recommendation:  A follow-up discussion with the attending provider and/or case management is planned    IF patient discharges home will need the following DME: family requesting rehab however may need increased DME once home       SUBJECTIVE:   Patient stated I am feeling good today.     OBJECTIVE DATA SUMMARY:   Cognitive/Behavioral Status:  Neurologic State: Alert; Appropriate for age  Orientation Level: Oriented X4  Cognition: Follows commands             Functional Mobility and Transfers for ADLs:  Bed Mobility:  Rolling: Moderate assistance; Assist x1  Supine to Sit: Moderate assistance; Assist x1  Scooting: Total assistance    Transfers:  Sit to Stand: Moderate assistance; Assist x1          Balance:  Sitting: Impaired  Sitting - Static: Fair (occasional)  Sitting - Dynamic: Fair (occasional); Poor (constant support)  Standing: Impaired; With support (RW)  Standing - Static: Fair; Constant support  Standing - Dynamic : Fair; Constant support    ADL Intervention:        Total A for self feeding, however able to tolerate B UE AROM  unilateral and bilateral without issues. Completed retraining and max education to attempt to complete on own, patient with poor appetite, spoke with RN on options and requests. Patient instructed and indicated understanding the benefits of maintaining activity tolerance, functional mobility, and independence with self care tasks during acute stay  to ensure safe return home and to baseline.  Encouraged patient to increase frequency and duration OOB, be out of bed for all meals, perform daily ADLs (as approved by RN/MD regarding bathing etc), and performing functional mobility to/from bathroom. Therapeutic Exercises:   B UE AROM  Shoulder flexion unilateral  Shoulder flexion bilateral  Shoulder shrugs B  Wrist flexion/extension  B  flexion/extension, arthritis limiting with 1/2 range noted with comfort  10x/ 2sets, semisupine positioning, vitals stable  Extended time required with rest breaks. Instructed to complete, will provide HEP next session    Pain:  None noted    Activity Tolerance:   Fair and requires rest breaks    After treatment patient left in no apparent distress:   Sitting in chair, Call bell within reach, Bed / chair alarm activated, and Caregiver / family present    COMMUNICATION/COLLABORATION:   The patients plan of care was discussed with: Physical therapist and Registered nurse.      Terrie Nuñez OT  Time Calculation: 30 mins

## 2021-11-04 LAB
BASOPHILS # BLD: 0 K/UL (ref 0–0.1)
BASOPHILS NFR BLD: 0 % (ref 0–1)
COVID-19 RAPID TEST, COVR: NOT DETECTED
DIFFERENTIAL METHOD BLD: ABNORMAL
EOSINOPHIL # BLD: 0.3 K/UL (ref 0–0.4)
EOSINOPHIL NFR BLD: 4 % (ref 0–7)
ERYTHROCYTE [DISTWIDTH] IN BLOOD BY AUTOMATED COUNT: 12.1 % (ref 11.5–14.5)
GLUCOSE BLD STRIP.AUTO-MCNC: 123 MG/DL (ref 65–117)
GLUCOSE BLD STRIP.AUTO-MCNC: 132 MG/DL (ref 65–117)
GLUCOSE BLD STRIP.AUTO-MCNC: 193 MG/DL (ref 65–117)
GLUCOSE BLD STRIP.AUTO-MCNC: 229 MG/DL (ref 65–117)
HCT VFR BLD AUTO: 35.6 % (ref 35–47)
HGB BLD-MCNC: 11.8 G/DL (ref 11.5–16)
IMM GRANULOCYTES # BLD AUTO: 0.1 K/UL (ref 0–0.04)
IMM GRANULOCYTES NFR BLD AUTO: 1 % (ref 0–0.5)
LYMPHOCYTES # BLD: 1.4 K/UL (ref 0.8–3.5)
LYMPHOCYTES NFR BLD: 17 % (ref 12–49)
MCH RBC QN AUTO: 32 PG (ref 26–34)
MCHC RBC AUTO-ENTMCNC: 33.1 G/DL (ref 30–36.5)
MCV RBC AUTO: 96.5 FL (ref 80–99)
MONOCYTES # BLD: 0.9 K/UL (ref 0–1)
MONOCYTES NFR BLD: 11 % (ref 5–13)
NEUTS SEG # BLD: 5.5 K/UL (ref 1.8–8)
NEUTS SEG NFR BLD: 66 % (ref 32–75)
NRBC # BLD: 0 K/UL (ref 0–0.01)
NRBC BLD-RTO: 0 PER 100 WBC
PLATELET # BLD AUTO: 115 K/UL (ref 150–400)
PMV BLD AUTO: 12.1 FL (ref 8.9–12.9)
RBC # BLD AUTO: 3.69 M/UL (ref 3.8–5.2)
SERVICE CMNT-IMP: ABNORMAL
SOURCE, COVRS: NORMAL
WBC # BLD AUTO: 8.3 K/UL (ref 3.6–11)

## 2021-11-04 PROCEDURE — 99233 SBSQ HOSP IP/OBS HIGH 50: CPT | Performed by: NURSE PRACTITIONER

## 2021-11-04 PROCEDURE — 74011636637 HC RX REV CODE- 636/637: Performed by: NURSE PRACTITIONER

## 2021-11-04 PROCEDURE — 36415 COLL VENOUS BLD VENIPUNCTURE: CPT

## 2021-11-04 PROCEDURE — 94640 AIRWAY INHALATION TREATMENT: CPT

## 2021-11-04 PROCEDURE — 82962 GLUCOSE BLOOD TEST: CPT

## 2021-11-04 PROCEDURE — 74011250637 HC RX REV CODE- 250/637: Performed by: NURSE PRACTITIONER

## 2021-11-04 PROCEDURE — 87635 SARS-COV-2 COVID-19 AMP PRB: CPT

## 2021-11-04 PROCEDURE — 65270000029 HC RM PRIVATE

## 2021-11-04 PROCEDURE — 74011000250 HC RX REV CODE- 250: Performed by: INTERNAL MEDICINE

## 2021-11-04 PROCEDURE — 74011250637 HC RX REV CODE- 250/637: Performed by: STUDENT IN AN ORGANIZED HEALTH CARE EDUCATION/TRAINING PROGRAM

## 2021-11-04 PROCEDURE — 85025 COMPLETE CBC W/AUTO DIFF WBC: CPT

## 2021-11-04 PROCEDURE — 74011250637 HC RX REV CODE- 250/637: Performed by: INTERNAL MEDICINE

## 2021-11-04 PROCEDURE — 77010033678 HC OXYGEN DAILY

## 2021-11-04 PROCEDURE — 74011000250 HC RX REV CODE- 250: Performed by: NURSE PRACTITIONER

## 2021-11-04 RX ORDER — LIDOCAINE 4 G/100G
1 PATCH TOPICAL EVERY 24 HOURS
Qty: 30 EACH | Refills: 2 | Status: SHIPPED
Start: 2021-11-04

## 2021-11-04 RX ORDER — LANOLIN ALCOHOL/MO/W.PET/CERES
6 CREAM (GRAM) TOPICAL
Qty: 30 TABLET | Refills: 1 | Status: SHIPPED
Start: 2021-11-04

## 2021-11-04 RX ORDER — FUROSEMIDE 40 MG/1
40 TABLET ORAL DAILY
Qty: 30 TABLET | Refills: 3 | Status: SHIPPED
Start: 2021-11-04

## 2021-11-04 RX ORDER — IPRATROPIUM BROMIDE AND ALBUTEROL SULFATE 2.5; .5 MG/3ML; MG/3ML
3 SOLUTION RESPIRATORY (INHALATION)
Qty: 30 NEBULE | Refills: 2 | Status: SHIPPED
Start: 2021-11-04 | End: 2022-11-04

## 2021-11-04 RX ORDER — OXYCODONE HYDROCHLORIDE 5 MG/1
5 TABLET ORAL
Qty: 14 TABLET | Refills: 0 | Status: SHIPPED | OUTPATIENT
Start: 2021-11-04 | End: 2021-11-09

## 2021-11-04 RX ADMIN — PANTOPRAZOLE SODIUM 40 MG: 40 TABLET, DELAYED RELEASE ORAL at 09:38

## 2021-11-04 RX ADMIN — APIXABAN 2.5 MG: 2.5 TABLET, FILM COATED ORAL at 09:38

## 2021-11-04 RX ADMIN — ROPINIROLE HYDROCHLORIDE 3 MG: 1 TABLET, FILM COATED ORAL at 21:40

## 2021-11-04 RX ADMIN — INSULIN LISPRO 4 UNITS: 100 INJECTION, SOLUTION INTRAVENOUS; SUBCUTANEOUS at 11:59

## 2021-11-04 RX ADMIN — ARFORMOTEROL TARTRATE 15 MCG: 15 SOLUTION RESPIRATORY (INHALATION) at 21:25

## 2021-11-04 RX ADMIN — ROPINIROLE HYDROCHLORIDE 3 MG: 1 TABLET, FILM COATED ORAL at 17:21

## 2021-11-04 RX ADMIN — ARFORMOTEROL TARTRATE 15 MCG: 15 SOLUTION RESPIRATORY (INHALATION) at 08:21

## 2021-11-04 RX ADMIN — ATENOLOL 25 MG: 25 TABLET ORAL at 09:38

## 2021-11-04 RX ADMIN — SODIUM CHLORIDE 10 ML: 9 INJECTION, SOLUTION INTRAMUSCULAR; INTRAVENOUS; SUBCUTANEOUS at 05:55

## 2021-11-04 RX ADMIN — MONTELUKAST 10 MG: 10 TABLET, FILM COATED ORAL at 21:40

## 2021-11-04 RX ADMIN — FUROSEMIDE 40 MG: 40 TABLET ORAL at 09:38

## 2021-11-04 RX ADMIN — BUDESONIDE 250 MCG: 0.25 INHALANT RESPIRATORY (INHALATION) at 08:21

## 2021-11-04 RX ADMIN — ROPINIROLE HYDROCHLORIDE 3 MG: 1 TABLET, FILM COATED ORAL at 09:38

## 2021-11-04 RX ADMIN — BUDESONIDE 250 MCG: 0.25 INHALANT RESPIRATORY (INHALATION) at 21:25

## 2021-11-04 RX ADMIN — SODIUM CHLORIDE 10 ML: 9 INJECTION, SOLUTION INTRAMUSCULAR; INTRAVENOUS; SUBCUTANEOUS at 21:48

## 2021-11-04 RX ADMIN — SODIUM CHLORIDE 10 ML: 9 INJECTION, SOLUTION INTRAMUSCULAR; INTRAVENOUS; SUBCUTANEOUS at 15:17

## 2021-11-04 RX ADMIN — MELATONIN 6 MG: at 21:46

## 2021-11-04 RX ADMIN — APIXABAN 2.5 MG: 2.5 TABLET, FILM COATED ORAL at 21:40

## 2021-11-04 NOTE — PROGRESS NOTES
Hospitalist Progress Note    NAME: James Mcgrath   :  3/7/1927   MRN:  [id number]     Estimated discharge date: 2021  Assessment / Plan:  Acute hypoxic respiratory failure, POA Likely secondary to   Acute congestive heart failure, POA  Cardiogenic pulmonary edema, POA  SIRS (tachycardia and leukocytosis) with no obvious source of infection  Was admitted initially to ICU and transferred to floor same day  Lasix resumed, monitor blood pressure intermittently dropping. ECHO show EF 35-40, elevated pBNP  CXR show pulmonary edema and bilateral effusion  Daily weights, I and O  Steroids stopped as this is not COPD exacerbation  C/w nebulizers  UA negative, CXR doesn't show consolidation  Procal .27, lactate normal  Azithromycin course completed  O2  DNR, improved, okay to transfer to medical bed  Discharge once SNF bed approved     Paroxysmal atrial fib with RVR POA  C/w eliquis  On atenolol   Controlled today     ROCÍO on CKD stage III POA creat 2.43 --> 1.32  Creatinine improving  Likely cardiorenal syndrome + Left hydronephrosis, Urology following  Bladder scan didn't show significant retention  CT abdomen showing left hydronephrosis    Low back pain:  Complains of left low back pain  CT show: 1. Severe progressive left hydronephrosis with neoplasia not excluded. 2. Lumbar degenerative disc disease as detailed. No fracture. Could be d/t hydronephrosis, pain control  CT also showed Hiatal hernia with non obstructive volvulus, however not having any symptoms from this currently. She is aware that she has a large hiatal hernia     Overweight POA Body mass index is 29.2 kg/m².     Code status: DNR  Prophylaxis: Eliquis   Recommended Disposition: Home with home health + hospice     Subjective:     Chief Complaint / Reason for Physician Visit  Follow up for ARF,/Afib RVR  Awaiting SNF bed  No complaints    Review of Systems:  Symptom Y/N Comments  Symptom Y/N Comments   Fever/Chills n   Chest Pain n    Poor Appetite    Edema     Cough    Abdominal Pain n    Sputum n   Joint Pain     SOB/MATTA    Pruritis/Rash     Nausea/vomit n   Tolerating PT/OT     Diarrhea n   Tolerating Diet y    Constipation    Other       Could NOT obtain due to:      Objective:     VITALS:   Last 24hrs VS reviewed since prior progress note. Most recent are:  Patient Vitals for the past 24 hrs:   Temp Pulse Resp BP SpO2   11/04/21 1504 99.4 °F (37.4 °C) 85 27 (!) 112/51 96 %   11/04/21 1022 99.1 °F (37.3 °C) 85 20 (!) 116/41 97 %   11/04/21 0824     98 %   11/04/21 0822     96 %   11/04/21 0758 99 °F (37.2 °C) 84 18 (!) 128/44 97 %   11/04/21 0313 98.2 °F (36.8 °C) 83 20 (!) 114/37 98 %   11/03/21 2317 99.1 °F (37.3 °C) 81 21 (!) 104/45 96 %   11/03/21 2151     96 %   11/03/21 1932 98.2 °F (36.8 °C) 86 20 (!) 109/53 99 %       Intake/Output Summary (Last 24 hours) at 11/4/2021 1828  Last data filed at 11/4/2021 1321  Gross per 24 hour   Intake    Output 1700 ml   Net -1700 ml        I had a face to face encounter and independently examined this patient on 11/4/2021, as outlined below:  PHYSICAL EXAM:  General: WD, WN. Alert, cooperative, no acute distress    EENT:  Anicteric sclerae. MMM  Resp:  No accessory muscle use, few crackles at bases  CV:  Regular  rhythm,  Edema b/l LE L>R  GI:  Soft, Non distended, Non tender. +Bowel sounds  Neurologic:  Alert and oriented X 3, normal speech,   Psych:   Good insight. Not anxious nor agitated  Skin:  No rashes.   No jaundice    Reviewed most current lab test results and cultures  YES  Reviewed most current radiology test results   YES  Review and summation of old records today    NO  Reviewed patient's current orders and MAR    YES  PMH/SH reviewed - no change compared to H&P  ________________________________________________________________________  Care Plan discussed with:    Comments   Patient y    Family      RN y    Care Manager     Consultant                        Multidiciplinary team rounds were held today with , nursing, pharmacist and clinical coordinator. Patient's plan of care was discussed; medications were reviewed and discharge planning was addressed. ________________________________________________________________________      Comments   >50% of visit spent in counseling and coordination of care     ________________________________________________________________________  Sudeep Hunter MD     Procedures: see electronic medical records for all procedures/Xrays and details which were not copied into this note but were reviewed prior to creation of Plan. LABS:  I reviewed today's most current labs and imaging studies.   Pertinent labs include:  Recent Labs     11/04/21  0412 11/03/21 0517 11/02/21  0648   WBC 8.3 8.0 8.5   HGB 11.8 11.4* 11.5   HCT 35.6 35.0 35.5   * 111* 87*     Recent Labs     11/03/21 0517 11/02/21  0648   * 135*   K 4.2 4.1   CL 96* 96*   CO2 36* 36*   * 135*   BUN 68* 76*   CREA 1.32* 1.52*   CA 9.4 9.2       Signed: Sudeep Hunter MD

## 2021-11-04 NOTE — PROGRESS NOTES
Palliative Medicine Consult  [place]: [phone] ([phone])    Patient Name: Abelardo Stout  YOB: 1927    Date of Initial Consult: 10/31/21  Reason for Consult: Care Decisions  Requesting Provider: Froilan Cabral MD   Primary Care Physician: Joseph Richard MD     SUMMARY:   Abelardo Stout is a 80 y. o. with a past history of CHF,DM, Afib, CKD stage III, HLD, TAVR  who was admitted on 10/26/2021 from home with a diagnosis of acute respiratory failure. Current medical issues leading to Palliative Medicine involvement include: Care decisions. Social: Lives with god son New Bassam in [place], [place]. PALLIATIVE DIAGNOSES:   1. Acute Respiratory Failure   2. Goals of Care  3. Constipation  4. Fraility       PLAN:   1. Prior to visit, I completed a  review of patient's medical records, including medical documentation, vital signs, MARs, and results of various labs and other diagnostics. I also spoke with patient's nurse, Karlee. 2. Spoke with patient in room she was by herself this time resting comfortably in bed. I discussed with her the plan to be discharged to a rehab facility and that [facility] and [facility] had been contacted to select which one will best fit her. She was happy to hear that. I discussed with her her current DNR status as an inpatient in the hospital setting and educated her on the [facility] form which would carry her wish of DNR upon her discharge to another facility. She agreed that is what she wanted and signed the form. I placed a copy in her chart and made her nurse Karlee aware. 3. Will sign off on patient please re consult as needed. 4. Initial consult note routed to primary continuity provider and/or primary health care team members  5.  Communicated plan of care with: Palliative IDTOriana 192 Team     GOALS OF CARE / TREATMENT PREFERENCES:     GOALS OF CARE:       TREATMENT PREFERENCES:   Code Status: DNR          Advance Care Planning:  [x] The [facility] Interdisciplinary Team has updated the ACP Navigator with Health Care Decision Maker and Patient Capacity      Advance Care Planning 10/26/2021   Patient's Healthcare Decision Maker is: (No Data)   Confirm Advance Directive Yes, not on file               Other:    As far as possible, the palliative care team has discussed with patient / health care proxy about goals of care / treatment preferences for patient. HISTORY:     History obtained from: Chart  CHIEF COMPLAINT: 79 yo  female with past medical history of CHF,DM, Afib, CKD stage III, HLD, TAVR who was admitted on 10/26/21 from [address] [place] [address] with complaints of shortness of breath. She had been having increasing shortness of breath for the last 2 weeks with bilateral lower extremity edema per [facility]. Her oxygen saturations were in the mid 80s that improved on NRB. She continued to exhibit increased WOB and was placed on BiPAP. She was given lasix, methylpred, levofloxacin, and ceftriaxone. She was then transferred to [facility] [place] [facility] for further workup and management.   HPI/SUBJECTIVE:    The patient is:   [x] Verbal and participatory  [] Non-participatory due to:       Clinical Pain Assessment (nonverbal scale for severity on nonverbal patients):   Clinical Pain Assessment  Severity: 0          Duration: for how long has pt been experiencing pain (e.g., 2 days, 1 month, years)  Frequency: how often pain is an issue (e.g., several times per day, once every few days, constant)     FUNCTIONAL ASSESSMENT:     Palliative Performance Scale (PPS):  PPS: 50       PSYCHOSOCIAL/SPIRITUAL SCREENING:     Palliative IDT has assessed this patient for cultural preferences / practices and a referral made as appropriate to needs (Cultural Services, Patient Advocacy, Ethics, etc.)    Any spiritual / Sabianism concerns:  [] Yes /  [x] No    Caregiver Burnout:  [] Yes /  [x] No /  [] No Caregiver Present      Anticipatory grief assessment:   [x] Normal  / [] Maladaptive       ESAS Anxiety: Anxiety: 0    ESAS Depression: Depression: 0        REVIEW OF SYSTEMS:     Positive and pertinent negative findings in ROS are noted above in HPI. The following systems were [x] reviewed / [] unable to be reviewed as noted in HPI  Other findings are noted below. Systems: constitutional, ears/nose/mouth/throat, respiratory, gastrointestinal, genitourinary, musculoskeletal, integumentary, neurologic, psychiatric, endocrine. Positive findings noted below. Modified ESAS Completed by: provider   Fatigue: 3 Drowsiness: 0   Depression: 0 Pain: 0   Anxiety: 0 Nausea: 3   Anorexia: 0 Dyspnea: 1     Constipation: Yes     Stool Occurrence(s): 1        PHYSICAL EXAM:     From RN flowsheet:  Wt Readings from Last 3 Encounters:   11/03/21 162 lb 4.8 oz (73.6 kg)     Blood pressure (!) 116/41, pulse 85, temperature 99.1 °F (37.3 °C), resp. rate 20, height 5' 4\" (1.626 m), weight 162 lb 4.8 oz (73.6 kg), SpO2 97 %, not currently breastfeeding.     Pain Scale 1: Numeric (0 - 10)  Pain Intensity 1: 0  Pain Onset 1: acute  Pain Location 1: Back  Pain Orientation 1: Lower  Pain Description 1: Aching  Pain Intervention(s) 1: Declines, Back rub, Warm pack, Other (comment) (Kpad)  Last bowel movement, if known: 10/28/21    Constitutional: Alert, pleasant, NAD  Eyes: PERRLA, anicteric  ENMT: no nasal discharge, moist mucous membranes  Cardiovascular: regular rhythm, IVCD distal pulses intact  Respiratory: breathing not labored, symmetric, anterior lungs CTA, posterior lung fields diminished  Gastrointestinal: soft non-tender, +bowel sounds  Musculoskeletal: no deformity, no tenderness to palpation  Skin: warm, dry  Neurologic: Alert and oriented x4, following commands, moving all extremities  Psychiatric: full affect, no hallucinations         HISTORY:     Active Problems:    Acute respiratory failure with hypoxia ([address] (10/26/2021)      Goals of care, counseling/discussion ()      Constipation, unspecified constipation type () Frailty ()      Past Medical History:   Diagnosis Date    CAD (coronary artery disease)     CKD (chronic kidney disease)     Hypertension       No past surgical history on file. No family history on file. History reviewed, no pertinent family history.   Social History     Tobacco Use    Smoking status: Not on file    Smokeless tobacco: Not on file   Substance Use Topics    Alcohol use: Not on file     Allergies   Allergen Reactions    Pcn [Penicillins] Not Reported This Time     Tolerated ceftriaxone 10/2021    Sulfa (Sulfonamide Antibiotics) Not Reported This Time      Current Facility-Administered Medications   Medication Dose Route Frequency    furosemide (LASIX) tablet 40 mg  40 mg Oral DAILY    docusate sodium (COLACE) capsule 100 mg  100 mg Oral DAILY PRN    prochlorperazine (COMPAZINE) with saline injection 5 mg  5 mg IntraVENous Q6H PRN    methocarbamoL (ROBAXIN) tablet 750 mg  750 mg Oral TID PRN    oxyCODONE IR (ROXICODONE) tablet 5 mg  5 mg Oral Q4H PRN    budesonide (PULMICORT) 250 mcg/2ml nebulizer susp  250 mcg Nebulization BID RT    And    arformoteroL (BROVANA) neb solution 15 mcg  15 mcg Nebulization BID RT    morphine injection 1 mg  1 mg IntraVENous Q4H PRN    melatonin tablet 6 mg  6 mg Oral QHS PRN    metoprolol (LOPRESSOR) injection 5 mg  5 mg IntraVENous Q6H PRN    lidocaine 4 % patch 1 Patch  1 Patch TransDERmal Q24H    sodium chloride (NS) flush 5-40 mL  5-40 mL IntraVENous Q8H    sodium chloride (NS) flush 5-40 mL  5-40 mL IntraVENous PRN    acetaminophen (TYLENOL) tablet 650 mg  650 mg Oral Q6H PRN    Or    acetaminophen (TYLENOL) suppository 650 mg  650 mg Rectal Q6H PRN    polyethylene glycol (MIRALAX) packet 17 g  17 g Oral DAILY PRN    ondansetron (ZOFRAN) injection 4 mg  4 mg IntraVENous Q6H PRN    pantoprazole (PROTONIX) tablet 40 mg  40 mg Oral DAILY    montelukast (SINGULAIR) tablet 10 mg  10 mg Oral QHS    [Held by provider] polyethylene glycol (MIRALAX) packet 17 g  17 g Oral DAILY    rOPINIRole (REQUIP) tablet 3 mg  3 mg Oral TID    glucose chewable tablet 16 g  4 Tablet Oral PRN    dextrose (D50W) injection syrg 12.5-25 g  12.5-25 g IntraVENous PRN    glucagon (GLUCAGEN) injection 1 mg  1 mg IntraMUSCular PRN    atenoloL (TENORMIN) tablet 25 mg  25 mg Oral DAILY    apixaban (ELIQUIS) tablet 2.5 mg  2.5 mg Oral Q12H    albuterol (PROVENTIL VENTOLIN) nebulizer solution 2.5 mg  2.5 mg Nebulization Q4H PRN    insulin lispro (HUMALOG) injection   SubCUTAneous AC&HS    influenza vaccine 2021-22 (6 mos+)(PF) (FLUARIX/FLULAVAL/FLUZONE QUAD) injection 0.5 mL  1 Each IntraMUSCular PRIOR TO DISCHARGE          LAB AND IMAGING FINDINGS:     Lab Results   Component Value Date/Time    WBC 8.3 11/04/2021 04:12 AM    HGB 11.8 11/04/2021 04:12 AM    PLATELET 986 (L) 45/79/9080 04:12 AM     Lab Results   Component Value Date/Time    Sodium 134 (L) 11/03/2021 05:17 AM    Potassium 4.2 11/03/2021 05:17 AM    Chloride 96 (L) 11/03/2021 05:17 AM    CO2 36 (H) 11/03/2021 05:17 AM    BUN 68 (H) 11/03/2021 05:17 AM    Creatinine 1.32 (H) 11/03/2021 05:17 AM    Calcium 9.4 11/03/2021 05:17 AM    Magnesium 2.4 10/27/2021 04:34 AM    Phosphorus 4.9 (H) 10/26/2021 01:36 AM      Lab Results   Component Value Date/Time    Alk.  phosphatase 65 10/28/2021 09:04 AM    Protein, total 6.6 10/28/2021 09:04 AM    Albumin 3.1 (L) 10/28/2021 09:04 AM    Globulin 3.5 10/28/2021 09:04 AM     Lab Results   Component Value Date/Time    INR 1.1 10/26/2021 01:36 AM    Prothrombin time 11.9 (H) 10/26/2021 01:36 AM    aPTT 20.4 (L) 10/26/2021 01:36 AM      No results found for: IRON, FE, TIBC, IBCT, PSAT, FERR   No results found for: PH, PCO2, PO2  No components found for: GLPOC   No results found for: CPK, CKMB             Total time: 35  Counseling / coordination time, spent as noted above: 20  > 50% counseling / coordination?: y    Prolonged service was provided for  []30 min   []75 min in face to face time in the presence of the patient, spent as noted above. Time Start:   Time End:   Note: this can only be billed with 12685 (initial) or 63981 (follow up). If multiple start / stop times, list each separately.

## 2021-11-04 NOTE — PROGRESS NOTES
Transition of Care Plan:     RUR:  14% - low   Disposition: SNF-[facility] (pending auth started 11/4 and rapid covid test)  Follow up appointments: PCP, Specialists  DME needed: Pt has a walker.   Transportation at Discharge: Pt's son will transport.   [address] or means to access home: Son will provide.   IM Medicare Letter: needed at d/c  Is patient a BCPI-A Bundle: No                  If yes, was Bundle Letter given?:   n/a  Caregiver Contact:Micheal Stanforddayne [phone]  Discharge Caregiver contacted prior to discharge? CM will contact prior to d/c.    2:27 p.m. Informed by Suma Zamudio pt will need an authorization, which she will start today. 1:50 p.m. CM received a call from [facility] who stated they could accept as well. CM met with pt to see if she wanted to change rehabs. Pt desires to continue forward with [facility].      11:26 a.m. [facility] accepted pt. CM spoke with pt and she is agreeable to [facility]. Pt called and updated Janel mojica. CM left a message with  [facility].     9:35 a.m. CM spoke with pt, pt's son Lauren Willard, and [place] Mr. Fanny Fritz regarding SNF options. FOC verbally reviewed. Pt and family requested referrals be sent to [facility] and [address] [facility]. CM will send referrals. CM reviewed chart and attempted to call Rayna Bland; however, the number on the chart is incorrect. CM called Sonali Posadas and he stated he would have Mr. Fanny Fritz call CM. CM also left message on Mr. Anu Sanchez home phone.     Hemalatha Houston, [facility]  [place], [place] [id number]

## 2021-11-04 NOTE — DISCHARGE SUMMARY
Hospitalist Discharge Note    NAME: Jayesh Palacios   :  3/7/1927   MRN:  [id number]     Admit date: 10/26/2021    Discharge date: 21    PCP: Hector Callejas MD    Discharge Diagnoses:  Acute hypoxic respiratory failure, POA Likely secondary to     Acute on chronic systolic congestive heart failure, POA    Cardiogenic pulmonary edema, POA    SIRS (tachycardia and leukocytosis) with no obvious source of infection     Paroxysmal Atrial fib with RVR POA     ROCÍO on CKD stage III POA creat 2.43 --> 1.32    Proximal severe left hydronephrosis POA    Low back pain POA    Large Hiatal hernia POA     Overweight POA Body mass index is 29.2 kg/m². Code status: DNR    Discharge Medications:  Current Discharge Medication List      START taking these medications    Details   furosemide (LASIX) 40 mg tablet Take 1 Tablet by mouth daily. Qty: 30 Tablet, Refills: 3      lidocaine 4 % patch 1 Patch by TransDERmal route every twenty-four (24) hours. Qty: 30 Each, Refills: 2      melatonin 3 mg tablet Take 2 Tablets by mouth nightly as needed for Insomnia or Sleep. Qty: 30 Tablet, Refills: 1      oxyCODONE IR (ROXICODONE) 5 mg immediate release tablet Take 1 Tablet by mouth every eight (8) hours as needed for Pain for up to 5 days. Max Daily Amount: 15 mg.  Qty: 14 Tablet, Refills: 0    Associated Diagnoses: Hydronephrosis, left         CONTINUE these medications which have CHANGED    Details   albuterol-ipratropium (DUO-NEB) 2.5 mg-0.5 mg/3 ml nebu 3 mL by Nebulization route every four (4) hours as needed for Wheezing or Shortness of Breath. Qty: 30 Nebule, Refills: 2      apixaban (ELIQUIS) 2.5 mg tablet Take 1 Tablet by mouth two (2) times a day. Qty: 60 Tablet, Refills: 2         CONTINUE these medications which have NOT CHANGED    Details   atenoloL (TENORMIN) 25 mg tablet Take 25 mg by mouth daily. diclofenac (VOLTAREN) 1 % gel Apply  to affected area.       escitalopram oxalate (LEXAPRO) 10 mg tablet Take 10 mg by mouth daily. ketoconazole (NIZORAL) 2 % shampoo Shampoo with weekly 1-2 times. montelukast (SINGULAIR) 10 mg tablet Take 10 mg by mouth At bedtime. pantoprazole (PROTONIX) 40 mg tablet Take 40 mg by mouth daily. polyethylene glycol (MIRALAX) 17 gram/dose powder 1 scoop po daily in a drink prn constipation. rOPINIRole (REQUIP) 3 mg tab tab TAKE 1 TABLET FOUR TIMES A DAY      fluticasone furoate-vilanteroL (Breo Ellipta) 100-25 mcg/dose inhaler Take 1 Puff by inhalation daily. acetaminophen (TYLENOL) 325 mg tablet Take (1/2) tablet in the morning and (1/2) tablet at nights      cyanocobalamin, vitamin B-12, (VITAMIN B12 PO) Take  by mouth. STOP taking these medications       albuterol (PROVENTIL VENTOLIN) 2.5 mg /3 mL (0.083 %) nebu Comments:   Reason for Stopping:         amLODIPine (NORVASC) 5 mg tablet Comments:   Reason for Stopping:         valsartan (DIOVAN) 320 mg tablet Comments:   Reason for Stopping:         zolpidem (AMBIEN) 10 mg tablet Comments:   Reason for Stopping:         ondansetron hcl (ZOFRAN) 4 mg tablet Comments:   Reason for Stopping:                Follow-up Information     Follow up With Specialties Details Why [address] [facility]   only if not transitioned to [phone] [facility] Aquilino Clement [address] [place] [address]  [phone]            Time spent on discharge:   I spent greater than 30 minutes on discharge, seeing and examining the patient, reconciling home meds and new meds, coordinating care with case management, doing the discharge papers and the D/C summary    Discharge disposition: SNF    Discharge Condition: Stable    Summary of admission H+P(copied from the ICU team admit Note):     Lois Eid is a 80 y.o. female who has a PMH of HF (EF unknown), (COPD, stage unknown, does not wear oxygen at home), Afib with RVR, CKD stage 3, Previous TAVR, anxiety, HLD, and DM. She presented to the ED at [facility] with shortness of breath that had been worsening over a period of about 1 week accompanied by increasing BLE edema. She demonstrated oxygen saturations to the mid 80s that improved on NRB. She continued to exhibit increased WOB and was placed on BiPAP. She was given lasix, methylpred, levofloxacin, and ceftriaxone. She was transferred to [facility] for further workup and management.     Upon arrival, I queried the patient about code status and she informed me that she has an advanced directive stating that she is a DNR/DNI. She does not have the advanced directive on hand, but does wish to be a DNR/DNI.     The patient arrived on BiPAP 40% 10/5 with sats %. She reported her WOB was improved. Her breathing did not appear labored. She was not tachypneic. VSS. Medications reviewed.      PMH: As per HPI  PSH: Hip arthroplasty 6/2001; Hysterectomy; prior joint replacement; TAVR 1/2019     PFH: HTN/Kidney disease- father    pCXR read by radiology FINDINGS:   A portable AP radiograph of the chest was obtained at 123 hours. The  patient is on a cardiac monitor. Small bilateral pleural effusions and pulmonary  edema are noted. Crystal Yessy Heart size is enlarged. .  The bones and soft tissues are  grossly within normal limits. IMPRESSION  Cardiomegaly and mild pulmonary edema. Bilateral pleural effusions are noted. CT lumbar spine read by radiology FINDINGS:  Paraspinal soft tissues show severe left hydronephrosis, etiology uncertain,  with neoplasia not excluded. This appears increased since Renal Sonogram  10/27/2021. There is scoliosis. There is 5 mm anterolisthesis at L4-5 and L5-S1. There is no fracture. There is multilevel degenerative disc, facet and interspinous disease.   T12-L1:  The spinal canal and neural foramina are nonstenotic. L1-2:  The spinal canal and neural foramina are nonstenotic. L2-3:  The spinal canal and neural foramina are nonstenotic. L3-4:  Mild spinal stenosis due to grade 1 anterolisthesis, mild diffuse disc  bulge, and facet arthrosis. Patent foramina. L4-5:  Mild to moderate spinal stenosis due to grade 1 anterolisthesis, diffuse  disc bulge, and facet arthrosis. Left foraminal stenosis. L5-S1:  The spinal canal and neural foramina are nonstenotic. Facet arthrosis. IMPRESSION  1. Severe progressive left hydronephrosis with neoplasia not excluded. 2. Lumbar degenerative disc disease as detailed. No fracture.     CT abdomen/pelvis read by radiology FINDINGS:  Inferior chest: Nonobstructive organoaxial gastric volvulus is associated with a  large paraesophageal hernia. At least two thirds of the stomach is in the chest.  Passive atelectasis is associated with small bilateral pleural effusions. The  heart is enlarged. Post aortic valve replacement. Mitral aortic calcifications  are mild to moderate. Abdomen: Left hydronephrosis is severe. The pelvis is dilated to the  ureteropelvic junction; the ureter is not dilated. The etiology is not clear; no  obstructing calculus or overt crossing vessel. Perinephric edema is associated,  and extends along the left retroperitoneum and coronal space to the  extraperitoneal and presacral fat. Post cholecystectomy. The pancreas is fatty infiltrated. The unenhanced liver,  spleen, and adrenals are normal. The bilateral kidneys are atrophic. Pelvis: The cecum is mobile in the right upper quadrant, a normal variant. Sigmoid diverticulosis is mild. Post hysterectomy. The unenhanced small bowel,  ileocecal junction, colon, and bladder are otherwise normal. No free air or  fluid, and no abdominopelvic lymphadenopathy. Right hip replacement causes  scatter artifact over the hemipelvis.  There are calcified injection granuloma in  the bilateral buttocks. IMPRESSION  1. Severe left hydronephrosis. The etiology of obstruction at the ureteropelvic  junction is occult. 2. Large paraesophageal hernia, with nonobstructive organoaxial gastric Volvulus. Echo TTE read by cardiology  Left Ventricle Normal cavity size. Mild concentric hypertrophy. The estimated EF is 35 - 40%. Visually measured ejection fraction. Moderately reduced systolic function. There is inconclusive left ventricular diastolic function. Left Atrium Mildly dilated left atrium. Right Ventricle Normal cavity size and global systolic function. Right Atrium Normal cavity size. Aortic Valve Prosthetic aortic valve. There is moderate aortic stenosis. Mild to moderate aortic valve regurgitation. Mitral Valve No stenosis. Mitral valve non-specific thickening. Moderate mitral annular calcification. Mild to moderate regurgitation. Tricuspid Valve Normal valve structure and no stenosis. Mild regurgitation. Pulmonic Valve The pulmonic valve was not assessed. Pulmonic valve not well visualized. Aorta Normal aortic root. Pulmonary Artery Pulmonary arteries not well visualized. IVC/Hepatic Veins Inferior vena cava not well visualized. Pericardium Insignificant pericardial effusion or fat. Bilateral LE doppler US read by radiology   Right Lower Venous  No evidence of deep vein thrombosis in the right lower extremity. No evidence of deep vein thrombosis in the common femoral, profunda femoral, femoral, popliteal, posterior tibial, and peroneal veins. The veins were imaged in the transverse and longitudinal planes. The vessels showed normal color filling and compressibility. Doppler interrogation showed phasic and spontaneous flow. Left Lower Venous  No evidence of deep vein thrombosis in the left lower extremity. No evidence of deep vein thrombosis in the common femoral, profunda femoral, femoral, popliteal, posterior tibial, and peroneal veins.  The veins were imaged in the transverse and longitudinal planes. The vessels showed normal color filling and compressibility. Doppler interrogation showed phasic and spontaneous flow. Hospital course:     Acute hypoxic respiratory failure, POA Likely secondary to   Acute on chronic systolic congestive heart failure, POA  Cardiogenic pulmonary edema, POA  SIRS (tachycardia and leukocytosis) with no obvious source of infection  Admitted initially to ICU and transferred to floor same day  Hypoxic with sats in 80% range on 100% MRFM --> BIPAP  pCXR Cardiomegaly and mild pulmonary edema  pBNP 14, 145  Diuresed with improvement  Empiric antibiotics  Lasix resumed, monitor blood pressure intermittently dropping. ECHO show EF 35-40, elevated pBNP  CXR show pulmonary edema and bilateral effusion  Daily weights, I and O  Steroids stopped as this is not COPD exacerbation  C/w nebulizers  UA negative, CXR doesn't show consolidation         Procal 0.1 to 0.27, lactate normal  Azithromycin course completed  O2  Plan for discharge SNF     Paroxysmal Atrial fib with RVR POA  C/w eliquis  On atenolol      ROCÍO on CKD stage III POA creat 2.43 --> 1.32  Proximal severe left hydronephrosis POA  Creatinine improving with diuresis, likely cardiorenal  Saw [facility] in house, felt obstruction is likely chronic      No urgent need for stent as long as renal function improved  Likely cardiorenal syndrome  Bladder scan didn't show significant retention  CT abdomen showing left hydronephrosis(proximally)  Urology will see     Low back pain POA  Complains of left low back pain  CT show: 1. Severe progressive left hydronephrosis with neoplasia not excluded. 2. Lumbar degenerative disc disease as detailed. No fracture. Could be d/t hydronephrosis, pain control    Large Hiatal hernia POA  CT also showed Hiatal hernia with non obstructive volvulus, however not having any symptoms from this currently.  She is aware that she has a large hiatal hernia     Overweight POA Body mass index is 29.2 kg/m². Code status: DNR  Prophylaxis: Eliquis   Recommended Disposition: Home with home health + hospice     Subjective:     Chief Complaint / Reason for Physician Visit  Follow up for ARF,/Afib RVR  No complaints  Back tolerable at present    Review of Systems:  Symptom Y/N Comments  Symptom Y/N Comments   Fever/Chills n   Chest Pain n    Poor Appetite    Edema     Cough    Abdominal Pain n    Sputum n   Joint Pain     SOB/MATTA    Pruritis/Rash     Nausea/vomit n   Tolerating PT/OT     Diarrhea n   Tolerating Diet y    Constipation    Other       Could NOT obtain due to:      Objective:     VITALS:   Last 24hrs VS reviewed since prior progress note. Most recent are:  Patient Vitals for the past 24 hrs:   Temp Pulse Resp BP SpO2   11/04/21 1022 99.1 °F (37.3 °C) 85 20 (!) 116/41 97 %   11/04/21 0824     98 %   11/04/21 0822     96 %   11/04/21 0758 99 °F (37.2 °C) 84 18 (!) 128/44 97 %   11/04/21 0313 98.2 °F (36.8 °C) 83 20 (!) 114/37 98 %   11/03/21 2317 99.1 °F (37.3 °C) 81 21 (!) 104/45 96 %   11/03/21 2151     96 %   11/03/21 1932 98.2 °F (36.8 °C) 86 20 (!) 109/53 99 %   11/03/21 1600 98 °F (36.7 °C) 79 19 (!) 125/47 98 %       Intake/Output Summary (Last 24 hours) at 11/4/2021 1507  Last data filed at 11/4/2021 1321  Gross per 24 hour   Intake    Output 1700 ml   Net -1700 ml        I had a face to face encounter and independently examined this patient on 11/4/2021, as outlined below:  PHYSICAL EXAM:  General: WD, WN. Alert, cooperative, no acute distress    EENT:  Anicteric sclerae. MMM  Resp:  No accessory muscle use, few crackles at bases  CV:  Regular  rhythm,  Edema b/l LE L>R  GI:  Soft, Non distended, Non tender. +Bowel sounds  Neurologic:  Alert and oriented X 3, normal speech,   Psych:   Good insight. Not anxious nor agitated  Skin:  No rashes.   No jaundice    Reviewed most current lab test results and cultures  YES  Reviewed most current radiology test results   YES  Review and summation of old records today    NO  Reviewed patient's current orders and MAR    YES  PMH/SH reviewed - no change compared to H&P  ________________________________________________________________________  Care Plan discussed with:    Comments   Patient y    Family      RN y    Care Manager     Consultant                        Multidiciplinary team rounds were held today with , nursing, pharmacist and clinical coordinator. Patient's plan of care was discussed; medications were reviewed and discharge planning was addressed. ________________________________________________________________________      Comments   >50% of visit spent in counseling and coordination of care     ________________________________________________________________________  Kelly Reyes MD     Procedures: see electronic medical records for all procedures/Xrays and details which were not copied into this note but were reviewed prior to creation of Plan. LABS:  I reviewed today's most current labs and imaging studies.   Pertinent labs include:  Recent Labs     11/04/21  0412 11/03/21 0517 11/02/21  0648   WBC 8.3 8.0 8.5   HGB 11.8 11.4* 11.5   HCT 35.6 35.0 35.5   * 111* 87*     Recent Labs     11/03/21 0517 11/02/21  0648   * 135*   K 4.2 4.1   CL 96* 96*   CO2 36* 36*   * 135*   BUN 68* 76*   CREA 1.32* 1.52*   CA 9.4 9.2       Signed: Kelly Reyes MD

## 2021-11-05 LAB
GLUCOSE BLD STRIP.AUTO-MCNC: 131 MG/DL (ref 65–117)
GLUCOSE BLD STRIP.AUTO-MCNC: 146 MG/DL (ref 65–117)
GLUCOSE BLD STRIP.AUTO-MCNC: 157 MG/DL (ref 65–117)
GLUCOSE BLD STRIP.AUTO-MCNC: 229 MG/DL (ref 65–117)
SERVICE CMNT-IMP: ABNORMAL

## 2021-11-05 PROCEDURE — 94640 AIRWAY INHALATION TREATMENT: CPT

## 2021-11-05 PROCEDURE — 97530 THERAPEUTIC ACTIVITIES: CPT

## 2021-11-05 PROCEDURE — 74011250637 HC RX REV CODE- 250/637: Performed by: INTERNAL MEDICINE

## 2021-11-05 PROCEDURE — 74011250637 HC RX REV CODE- 250/637: Performed by: STUDENT IN AN ORGANIZED HEALTH CARE EDUCATION/TRAINING PROGRAM

## 2021-11-05 PROCEDURE — 74011636637 HC RX REV CODE- 636/637: Performed by: NURSE PRACTITIONER

## 2021-11-05 PROCEDURE — 74011000250 HC RX REV CODE- 250: Performed by: NURSE PRACTITIONER

## 2021-11-05 PROCEDURE — 74011000250 HC RX REV CODE- 250: Performed by: INTERNAL MEDICINE

## 2021-11-05 PROCEDURE — 82962 GLUCOSE BLOOD TEST: CPT

## 2021-11-05 PROCEDURE — 65270000029 HC RM PRIVATE

## 2021-11-05 PROCEDURE — 74011250637 HC RX REV CODE- 250/637: Performed by: NURSE PRACTITIONER

## 2021-11-05 PROCEDURE — 2709999900 HC NON-CHARGEABLE SUPPLY

## 2021-11-05 RX ADMIN — INSULIN LISPRO 3 UNITS: 100 INJECTION, SOLUTION INTRAVENOUS; SUBCUTANEOUS at 17:28

## 2021-11-05 RX ADMIN — MELATONIN 6 MG: at 21:43

## 2021-11-05 RX ADMIN — INSULIN LISPRO 4 UNITS: 100 INJECTION, SOLUTION INTRAVENOUS; SUBCUTANEOUS at 12:49

## 2021-11-05 RX ADMIN — SODIUM CHLORIDE 10 ML: 9 INJECTION, SOLUTION INTRAMUSCULAR; INTRAVENOUS; SUBCUTANEOUS at 12:51

## 2021-11-05 RX ADMIN — FUROSEMIDE 40 MG: 40 TABLET ORAL at 08:51

## 2021-11-05 RX ADMIN — APIXABAN 2.5 MG: 2.5 TABLET, FILM COATED ORAL at 08:51

## 2021-11-05 RX ADMIN — ROPINIROLE HYDROCHLORIDE 3 MG: 1 TABLET, FILM COATED ORAL at 21:43

## 2021-11-05 RX ADMIN — ARFORMOTEROL TARTRATE 15 MCG: 15 SOLUTION RESPIRATORY (INHALATION) at 08:05

## 2021-11-05 RX ADMIN — APIXABAN 2.5 MG: 2.5 TABLET, FILM COATED ORAL at 21:43

## 2021-11-05 RX ADMIN — BUDESONIDE 250 MCG: 0.25 INHALANT RESPIRATORY (INHALATION) at 20:31

## 2021-11-05 RX ADMIN — PANTOPRAZOLE SODIUM 40 MG: 40 TABLET, DELAYED RELEASE ORAL at 08:51

## 2021-11-05 RX ADMIN — SODIUM CHLORIDE 10 ML: 9 INJECTION, SOLUTION INTRAMUSCULAR; INTRAVENOUS; SUBCUTANEOUS at 21:45

## 2021-11-05 RX ADMIN — BUDESONIDE 250 MCG: 0.25 INHALANT RESPIRATORY (INHALATION) at 08:05

## 2021-11-05 RX ADMIN — MONTELUKAST 10 MG: 10 TABLET, FILM COATED ORAL at 21:43

## 2021-11-05 RX ADMIN — SODIUM CHLORIDE 10 ML: 9 INJECTION, SOLUTION INTRAMUSCULAR; INTRAVENOUS; SUBCUTANEOUS at 05:28

## 2021-11-05 RX ADMIN — ATENOLOL 25 MG: 25 TABLET ORAL at 08:52

## 2021-11-05 RX ADMIN — ROPINIROLE HYDROCHLORIDE 3 MG: 1 TABLET, FILM COATED ORAL at 08:51

## 2021-11-05 RX ADMIN — ARFORMOTEROL TARTRATE 15 MCG: 15 SOLUTION RESPIRATORY (INHALATION) at 20:31

## 2021-11-05 RX ADMIN — ROPINIROLE HYDROCHLORIDE 3 MG: 1 TABLET, FILM COATED ORAL at 17:28

## 2021-11-05 NOTE — PROGRESS NOTES
Nephrology Progress Note  Aquilino Stiles     [email or web address]  Phone - [phone]   Patient: Rod Perez    YOB: 1927        Date- 11/5/2021   Admit Date: 10/26/2021  CC: Follow up for ROCÍO       IMPRESSION & PLAN:   · ROCÍO   · CHF  · Hyponatremia due to chf  · CAD 3b- bl cr 1.5  · Hypertension  · Sob- pulmonary edema  · afib with RVR  ·  Atrophic right kidney  · Left hydronephrosis - chronic  · Chronic Back pain    PLAN-   Continue Lasix 40 mg daily   Hold diovan   Urology input noted   Okay to d/c renal stand point   Consider Hospice      Subjective: Interval History:   -cr stable  Na 134 on 11/3   No new labs today   D/w pt  at bedside     Objective:   Vitals:    11/04/21 2254 11/05/21 0351 11/05/21 0745 11/05/21 0805   BP: (!) 106/45 (!) 113/35 125/63    Pulse: 96 90 85    Resp: 19 23 21    Temp: 98 °F (36.7 °C) 99.4 °F (37.4 °C) 99.3 °F (37.4 °C)    SpO2: 97% 98% 95% 96%   Weight:       Height:          11/04 0701 - 11/05 0700  In: -   Out: 1100 [Urine:1100]  Last 3 Recorded Weights in this Encounter    10/28/21 1221 10/30/21 0520 11/03/21 0626   Weight: 76.7 kg (169 lb) 73.3 kg (161 lb 9.6 oz) 73.6 kg (162 lb 4.8 oz)      Physical exam:   GEN: NAD  NECK- Supple, no mass  RESP: No wheezing, decreased air movement b/l  CVS: S1,S2  , rrr  EXT: + edema  PSYCH:Can't access due to patient's current condition       Chart reviewed. Pertinent Notes reviewed. Data Review :  Recent Labs     11/03/21  0517   *   K 4.2   CL 96*   CO2 36*   BUN 68*   CREA 1.32*   *   CA 9.4     Recent Labs     11/04/21  0412 11/03/21  0517   WBC 8.3 8.0   HGB 11.8 11.4*   HCT 35.6 35.0   * 111*     No results for input(s): FE, TIBC, PSAT, FERR in the last 72 hours.    Medication list  reviewed  Current Facility-Administered Medications   Medication Dose Route Frequency    furosemide (LASIX) tablet 40 mg  40 mg Oral DAILY    docusate sodium (COLACE) capsule 100 mg  100 mg Oral DAILY PRN    prochlorperazine (COMPAZINE) with saline injection 5 mg  5 mg IntraVENous Q6H PRN    methocarbamoL (ROBAXIN) tablet 750 mg  750 mg Oral TID PRN    oxyCODONE IR (ROXICODONE) tablet 5 mg  5 mg Oral Q4H PRN    budesonide (PULMICORT) 250 mcg/2ml nebulizer susp  250 mcg Nebulization BID RT    And    arformoteroL (BROVANA) neb solution 15 mcg  15 mcg Nebulization BID RT    morphine injection 1 mg  1 mg IntraVENous Q4H PRN    melatonin tablet 6 mg  6 mg Oral QHS PRN    metoprolol (LOPRESSOR) injection 5 mg  5 mg IntraVENous Q6H PRN    lidocaine 4 % patch 1 Patch  1 Patch TransDERmal Q24H    sodium chloride (NS) flush 5-40 mL  5-40 mL IntraVENous Q8H    sodium chloride (NS) flush 5-40 mL  5-40 mL IntraVENous PRN    acetaminophen (TYLENOL) tablet 650 mg  650 mg Oral Q6H PRN    Or    acetaminophen (TYLENOL) suppository 650 mg  650 mg Rectal Q6H PRN    polyethylene glycol (MIRALAX) packet 17 g  17 g Oral DAILY PRN    ondansetron (ZOFRAN) injection 4 mg  4 mg IntraVENous Q6H PRN    pantoprazole (PROTONIX) tablet 40 mg  40 mg Oral DAILY    montelukast (SINGULAIR) tablet 10 mg  10 mg Oral QHS    [Held by provider] polyethylene glycol (MIRALAX) packet 17 g  17 g Oral DAILY    rOPINIRole (REQUIP) tablet 3 mg  3 mg Oral TID    glucose chewable tablet 16 g  4 Tablet Oral PRN    dextrose (D50W) injection syrg 12.5-25 g  12.5-25 g IntraVENous PRN    glucagon (GLUCAGEN) injection 1 mg  1 mg IntraMUSCular PRN    atenoloL (TENORMIN) tablet 25 mg  25 mg Oral DAILY    apixaban (ELIQUIS) tablet 2.5 mg  2.5 mg Oral Q12H    albuterol (PROVENTIL VENTOLIN) nebulizer solution 2.5 mg  2.5 mg Nebulization Q4H PRN    insulin lispro (HUMALOG) injection   SubCUTAneous AC&HS    influenza vaccine 2021-22 (6 mos+)(PF) (FLUARIX/FLULAVAL/FLUZONE QUAD) injection 0.5 mL  1 Each IntraMUSCular PRIOR TO DISCHARGE          Ángel Ingram MD              [facility]  Formerly [facility] / [address], [address]  [place], [address]  Phone - [phone]               Fax - [phone]

## 2021-11-05 NOTE — PROGRESS NOTES
Problem: Mobility Impaired (Adult and Pediatric)  Goal: *Acute Goals and Plan of Care (Insert Text)  Description: FUNCTIONAL STATUS PRIOR TO ADMISSION: Mod I with use of RW. Denies history of falls. Denies home O2 use. God son assists with meal prep and ADLs as needed    HOME SUPPORT PRIOR TO ADMISSION: The patient lived with God son who provides 24hr assist.    Physical Therapy Goals  Initiated 11/3/2021  1. Patient will move from supine to sit and sit to supine , scoot up and down, and roll side to side in bed with minimal assistance/contact guard assist within 7 day(s). 2.  Patient will transfer from bed to chair and chair to bed with minimal assistance/contact guard assist using the least restrictive device within 7 day(s). 3.  Patient will perform sit to stand with minimal assistance/contact guard assist within 7 day(s). 4.  Patient will ambulate with minimal assistance/contact guard assist for 30 feet with the least restrictive device within 7 day(s). Outcome: Progressing Towards Goal    PHYSICAL THERAPY TREATMENT  Patient: Amanda Willoughby (52 y.o. female)  Date: 11/5/2021  Diagnosis: Acute respiratory failure with [facility] ([facility] [J96.01]   <principal problem not specified>       Precautions: Aspiration, Fall, Skin  Chart, physical therapy assessment, plan of care and goals were reviewed. ASSESSMENT  Patient continues with skilled PT services and is progressing towards goals. Pt received supine in bed and willing to work with therapy. Pt presents below baseline with decreased strength with BLE, activity tolerance, and independence with this session. Pt able to tolerate bed mobility to L side EOB wit Mod A, total for scooting EOB. Pt continued with STS with Mod A with active BM. RN students assisted with personal care during standing with rest breaks as needed. Pt unable to advance BLE for transfer at this time with assistance with weight shifts as well.  Pt completed 3 STS with prolonged standing with VC for upright posture. Pt assisted back to supine with max A, pt completed session with Rn students fininshing the personal care and bed change. Rn notified of session. Noted that pt was tachy with session HR up to 150's at times. Current Level of Function Impacting Discharge (mobility/balance): mod a for bed mobility, STS, unable to transfer. Other factors to consider for discharge: fall risk, activity tolerance         PLAN :  Patient continues to benefit from skilled intervention to address the above impairments. Continue treatment per established plan of care. to address goals. Recommendation for discharge: (in order for the patient to meet his/her long term goals)  Therapy up to 5 days/week in SNF setting    This discharge recommendation:  Has not yet been discussed the attending provider and/or case management    IF patient discharges home will need the following DME: patient owns DME required for discharge       SUBJECTIVE:   Patient stated I was walking with my walker before. Elmira Carrasco    OBJECTIVE DATA SUMMARY:   Critical Behavior:  Neurologic State: Alert, Eyes open spontaneously  Orientation Level: Oriented X4  Cognition: Appropriate decision making, Appropriate safety awareness, Follows commands  Safety/Judgement: Awareness of environment  Functional Mobility Training:  Bed Mobility:  Rolling: Moderate assistance  Supine to Sit: Moderate assistance  Sit to Supine: Maximum assistance  Scooting: Total assistance    Transfers:  Sit to Stand: Moderate assistance  Stand to Sit: Minimum assistance  Bed to Chair:  (unable to advance BLE)     Balance:  Sitting: Impaired  Sitting - Static: Fair (occasional)  Sitting - Dynamic: Fair (occasional)  Standing: Impaired  Standing - Static: Constant support;  Fair  Standing - Dynamic : Constant support; Fair; Poor  Therapeutic Exercises:   Seated LAQ x10 ea    Pain Rating:  No pain reported    Activity Tolerance:   Fair, requires rest breaks and tachy HR with activity    After treatment patient left in no apparent distress:   Call bell within reach and Rn students for bed change    COMMUNICATION/COLLABORATION:   The patients plan of care was discussed with: Registered nurse.      Isabel Gaming PTA   Time Calculation: 25 mins

## 2021-11-05 NOTE — PROGRESS NOTES
End of Shift Note    Bedside shift change report given to artie (oncoming nurse) by Moris Cee (offgoing nurse). Report included the following information SBAR, Kardex, Intake/Output, MAR, Recent Results and Cardiac Rhythm afib    Shift worked:  night     Shift summary and any significant changes:     vss, on 2L, d/c in morning to SNF, d/c orders put in by MD Savi overnight, CHG bath done, x2BM      Concerns for physician to address:  see above      Zone phone for oncoming shift:   [phone]       Activity:  Activity Level: Bed Rest  Number times ambulated in hallways past shift: 0  Number of times OOB to chair past shift: 0    Cardiac:   Cardiac Monitoring: Yes      Cardiac Rhythm: Atrial Fib    Access:   Current line(s): PIV     Genitourinary:   Urinary status: voiding, incontinent and external catheter    Respiratory:   O2 Device: Nasal cannula  Chronic home O2 use?: NO  Incentive spirometer at bedside: YES     GI:  Last Bowel Movement Date: 11/04/21  Current diet:  ADULT DIET Regular; 3 carb choices (45 gm/meal); No Salt Added (3-4 gm)  ADULT ORAL NUTRITION SUPPLEMENT Lunch, Dinner, Breakfast; Diabetic Supplement  Passing flatus: YES  Tolerating current diet: YES       Pain Management:   Patient states pain is manageable on current regimen: NO    Skin:  Malik Score: 16  Interventions: turn team, speciality bed, float heels, increase time out of bed, foam dressing, PT/OT consult, limit briefs, internal/external urinary devices and nutritional support     Patient Safety:  Fall Score:  Total Score: 3  Interventions: bed/chair alarm, assistive device (walker, cane, etc), gripper socks, pt to call before getting OOB and stay with me (per policy)  High Fall Risk: Yes    Length of Stay:  Expected LOS: 4d 19h  Actual LOS: [address]

## 2021-11-05 NOTE — PROGRESS NOTES
Transition of Care Plan:     RUR:  14% - low   Disposition: [facility] (pending auth started 11/4 and rapid covid test)  Follow up appointments: PCP, Specialists  DME needed: Pt has a walker.   Transportation at Discharge: Pt's son will transport.   Ladona Hastings or means to access home: Son will provide.   IM Medicare Letter: needed at d/c  Is patient a BCPI-A Bundle: No                  If yes, was Bundle Letter given?:   n/a  Caregiver Contact:Micheal Cornell [phone]  Discharge Caregiver contacted prior to discharge? CM will contact prior to d/c.    3:11 p.m. CM waiting to hear back from [facility] if they can transport this evening. 2:54 p.m. CM informed pt's nephew, Anaya Metcalf, and he is ok with d/c time this evening. 2:46 p.m. TYRESE spoke with Gareth Eisenberg of [address] [place] [address] who stated pt's auth came back. CM explained [facility] is running behind and cannot transport until 9 p.m. this evening. Gareth Eisenberg checked with admissions and they are willing to accept.        CM called and spoke with [address] [place] [address] [facility] dept to check on the status of the authorization. Karli advised it is still pending. CM will continue to follow up.     Sammy Hendrickson, MSW  Care Management, [place] [phone]

## 2021-11-05 NOTE — PROGRESS NOTES
Pharmacist Discharge Medication Reconciliation    Significant PMH:   Past Medical History:   Diagnosis Date    CAD (coronary artery disease)     CKD (chronic kidney disease)     Hypertension      Encounter Diagnoses:   Encounter Diagnosis   Name Primary? Hydronephrosis, left Yes     Allergies: Pcn [penicillins] and Sulfa (sulfonamide antibiotics)    Discharge Medications:   Current Discharge Medication List        START taking these medications    Details   furosemide (LASIX) 40 mg tablet Take 1 Tablet by mouth daily. Qty: 30 Tablet, Refills: 3  Start date: 11/4/2021      lidocaine 4 % patch 1 Patch by TransDERmal route every twenty-four (24) hours. Qty: 30 Each, Refills: 2  Start date: 11/4/2021      melatonin 3 mg tablet Take 2 Tablets by mouth nightly as needed for Insomnia or Sleep. Qty: 30 Tablet, Refills: 1  Start date: 11/4/2021      oxyCODONE IR (ROXICODONE) 5 mg immediate release tablet Take 1 Tablet by mouth every eight (8) hours as needed for Pain for up to 5 days. Max Daily Amount: 15 mg.  Qty: 14 Tablet, Refills: 0  Start date: 11/4/2021, End date: 11/9/2021    Associated Diagnoses: Hydronephrosis, left           CONTINUE these medications which have CHANGED    Details   albuterol-ipratropium (DUO-NEB) 2.5 mg-0.5 mg/3 ml nebu 3 mL by Nebulization route every four (4) hours as needed for Wheezing or Shortness of Breath. Qty: 30 Nebule, Refills: 2  Start date: 11/4/2021, End date: 11/4/2022      apixaban (ELIQUIS) 2.5 mg tablet Take 1 Tablet by mouth two (2) times a day. Qty: 60 Tablet, Refills: 2  Start date: 11/4/2021           CONTINUE these medications which have NOT CHANGED    Details   atenoloL (TENORMIN) 25 mg tablet Take 25 mg by mouth daily. diclofenac (VOLTAREN) 1 % gel Apply  to affected area. escitalopram oxalate (LEXAPRO) 10 mg tablet Take 10 mg by mouth daily. ketoconazole (NIZORAL) 2 % shampoo Shampoo with weekly 1-2 times.       montelukast (SINGULAIR) 10 mg tablet Take 10 mg by mouth At bedtime. pantoprazole (PROTONIX) 40 mg tablet Take 40 mg by mouth daily. polyethylene glycol (MIRALAX) 17 gram/dose powder 1 scoop po daily in a drink prn constipation. rOPINIRole (REQUIP) 3 mg tab tab TAKE 1 TABLET FOUR TIMES A DAY      fluticasone furoate-vilanteroL (Breo Ellipta) 100-25 mcg/dose inhaler Take 1 Puff by inhalation daily. acetaminophen (TYLENOL) 325 mg tablet Take (1/2) tablet in the morning and (1/2) tablet at nights      cyanocobalamin, vitamin B-12, (VITAMIN B12 PO) Take  by mouth. STOP taking these medications       albuterol (PROVENTIL VENTOLIN) 2.5 mg /3 mL (0.083 %) nebu Comments:   Reason for Stopping:         amLODIPine (NORVASC) 5 mg tablet Comments:   Reason for Stopping:         valsartan (DIOVAN) 320 mg tablet Comments:   Reason for Stopping:         zolpidem (AMBIEN) 10 mg tablet Comments:   Reason for Stopping:         ondansetron hcl (ZOFRAN) 4 mg tablet Comments:   Reason for Stopping:               The patient's chart, MAR and AVS were reviewed by Nereida Sandhoff, [facility].     Discharging Provider: Amy Hansen MD    Thank you,     Nereida Sandhoff, [facility]

## 2021-11-05 NOTE — DISCHARGE INSTRUCTIONS
HOSPITALIST DISCHARGE INSTRUCTIONS    NAME: Rod Perez   :  3/7/1927   MRN:  [id number]     Date/Time:  2021 11:39 PM    ADMIT DATE: 10/26/2021   DISCHARGE DATE: 2021     Attending Physician: Blake Hardwick MD    DISCHARGE DIAGNOSIS:    Acute on chronic systolic CHF    Left hydronephrosis      Medications: Per above medication reconciliation. Pain Management: per above medications    Recommended diet: Cardiac Diet    Recommended activity: Activity as tolerated    Wound care: None    Indwelling devices:  None    Supplemental Oxygen: 2 LNC,  wean as tolerated    Required Lab work: Per SNF routine    Glucose management:  None    Code status: DNR        Outside physician follow up: Follow-up Information     Follow up With Specialties Details [facility]  Schedule an appointment as soon as possible for a visit in 3 weeks  [address] [place] [address] [phone] [place] [address] Javier Meyers [address] Melina Clement [address]    [place] MD Family Medicine Schedule an appointment as soon as possible for a visit in 1 week after discharge from [address]  [phone]             CHF specific discharge instructions    Weight:  · Daily weights, Notify your Doctor if Wt gain of 3 lb in a day or 5 lb in a week     Diet :  · Low salt cardiac diet   · Limit Sodium to 2500 mg per day              Skilled nursing facility/ SNF MD responsible for above on discharge. Information obtained by :  I understand that if any problems occur once I am at home I am to contact my physician. I understand and acknowledge receipt of the instructions indicated above.                                                                                                                                            Physician's or R.N.'s Signature Date/Time                                                                                                                                              Patient or Repres

## 2021-11-06 VITALS
BODY MASS INDEX: 27.59 KG/M2 | WEIGHT: 161.6 LBS | DIASTOLIC BLOOD PRESSURE: 58 MMHG | TEMPERATURE: 98.9 F | SYSTOLIC BLOOD PRESSURE: 110 MMHG | HEIGHT: 64 IN | RESPIRATION RATE: 18 BRPM | OXYGEN SATURATION: 92 % | HEART RATE: 95 BPM

## 2021-11-06 LAB
GLUCOSE BLD STRIP.AUTO-MCNC: 151 MG/DL (ref 65–117)
GLUCOSE BLD STRIP.AUTO-MCNC: 156 MG/DL (ref 65–117)
SERVICE CMNT-IMP: ABNORMAL
SERVICE CMNT-IMP: ABNORMAL

## 2021-11-06 PROCEDURE — 74011636637 HC RX REV CODE- 636/637: Performed by: NURSE PRACTITIONER

## 2021-11-06 PROCEDURE — 74011250637 HC RX REV CODE- 250/637: Performed by: INTERNAL MEDICINE

## 2021-11-06 PROCEDURE — 74011000250 HC RX REV CODE- 250: Performed by: INTERNAL MEDICINE

## 2021-11-06 PROCEDURE — 90471 IMMUNIZATION ADMIN: CPT

## 2021-11-06 PROCEDURE — 94640 AIRWAY INHALATION TREATMENT: CPT

## 2021-11-06 PROCEDURE — 82962 GLUCOSE BLOOD TEST: CPT

## 2021-11-06 PROCEDURE — 90686 IIV4 VACC NO PRSV 0.5 ML IM: CPT | Performed by: INTERNAL MEDICINE

## 2021-11-06 PROCEDURE — 77010033678 HC OXYGEN DAILY

## 2021-11-06 PROCEDURE — 74011250637 HC RX REV CODE- 250/637: Performed by: STUDENT IN AN ORGANIZED HEALTH CARE EDUCATION/TRAINING PROGRAM

## 2021-11-06 PROCEDURE — 74011250637 HC RX REV CODE- 250/637: Performed by: NURSE PRACTITIONER

## 2021-11-06 PROCEDURE — 74011250636 HC RX REV CODE- 250/636: Performed by: INTERNAL MEDICINE

## 2021-11-06 RX ADMIN — APIXABAN 2.5 MG: 2.5 TABLET, FILM COATED ORAL at 08:01

## 2021-11-06 RX ADMIN — ROPINIROLE HYDROCHLORIDE 3 MG: 1 TABLET, FILM COATED ORAL at 08:01

## 2021-11-06 RX ADMIN — INFLUENZA VIRUS VACCINE 0.5 ML: 15; 15; 15; 15 SUSPENSION INTRAMUSCULAR at 08:58

## 2021-11-06 RX ADMIN — BUDESONIDE 250 MCG: 0.25 INHALANT RESPIRATORY (INHALATION) at 08:34

## 2021-11-06 RX ADMIN — FUROSEMIDE 40 MG: 40 TABLET ORAL at 08:01

## 2021-11-06 RX ADMIN — INSULIN LISPRO 3 UNITS: 100 INJECTION, SOLUTION INTRAVENOUS; SUBCUTANEOUS at 08:01

## 2021-11-06 RX ADMIN — ATENOLOL 25 MG: 25 TABLET ORAL at 08:01

## 2021-11-06 RX ADMIN — SODIUM CHLORIDE 10 ML: 9 INJECTION, SOLUTION INTRAMUSCULAR; INTRAVENOUS; SUBCUTANEOUS at 06:06

## 2021-11-06 RX ADMIN — PANTOPRAZOLE SODIUM 40 MG: 40 TABLET, DELAYED RELEASE ORAL at 08:01

## 2021-11-06 RX ADMIN — ARFORMOTEROL TARTRATE 15 MCG: 15 SOLUTION RESPIRATORY (INHALATION) at 08:34

## 2021-11-06 NOTE — PROGRESS NOTES
Bedside and Verbal shift change report given to kalyn (oncoming nurse) by Logan Meyer (offgoing nurse).  Report included the following information SBAR, Kardex, Intake/Output, MAR, Recent Results and Cardiac Rhythm afib.       0700: Bedside shift report given to Parker Jo

## 2021-11-06 NOTE — PROGRESS NOTES
Attempted to call report to [facility]. Phone just rang and no answer. Will try back soon.

## 2021-11-06 NOTE — PROGRESS NOTES
Problem: Pressure Injury - Risk of  Goal: *Prevention of pressure injury  Description: Document Malik Scale and appropriate interventions in the flowsheet. Outcome: Resolved/Met  Note: Pressure Injury Interventions:  Sensory Interventions: Assess changes in LOC, Check visual cues for pain, Keep linens dry and wrinkle-free, Maintain/enhance activity level, Minimize linen layers, Turn and reposition approx. every two hours (pillows and wedges if needed)    Moisture Interventions: Absorbent underpads    Activity Interventions: PT/OT evaluation    Mobility Interventions: Turn and reposition approx.  every two hours(pillow and wedges)    Nutrition Interventions: Document food/fluid/supplement intake    Friction and Shear Interventions: Lift sheet, Minimize layers

## 2022-04-07 NOTE — CONSULTS
Consult    NAME: Cali Vides   :  3/7/1927   MRN:  [id number]     Date/Time:  10/29/2021 11:46 AM    Patient PCP: Angeli Venegas MD  ________________________________________________________________________     Assessment:     - Acute on chronic systolic chf    - Cath 4031 with mild CAD  - Severe AS s/p TAVR in  with 23mm Chintan, echo 10/2021 EF 35%, mean gradient of 24mm Hg across valve, mild to moderate AI, mild to moderate MR  - PAF on anticoagulation and rate control  - HTN  - CKD  - Macular degneration  - Arthritis  , god son takes care of her, walker/wheelchair  DNR          Plan:     - Equivicol troponin, manage medically  - Volume overload, diurese  - PAF continue anticoagulation and rate control    - Resume eliquis 2.5mg bid when able  - Cont atenolol 25mg currently rate controlled  - Holding diovan due to increased cr, consider entresto vs. Hydralazine/imdur based on cr  - Cont lasix IV 40mg iv bid, likely discharge on lasix 40mg daily, follow bmp    Home once off of O2          [x]        High complexity decision making was performed        Subjective:   CHIEF COMPLAINT: dyspnea    HISTORY OF PRESENT ILLNESS:       Cali Vides is a 80 y.o. female who has a PMH of HF (EF unknown), (COPD, stage unknown, does not wear oxygen at home), Afib with RVR, CKD stage 3, Previous TAVR, anxiety, HLD, and DM. She presented to the ED at [facility] with shortness of breath that had been worsening over a period of about 1 week accompanied by increasing BLE edema. She demonstrated oxygen saturations to the mid 80s that improved on NRB. She continued to exhibit increased WOB and was placed on BiPAP. She was given lasix, methylpred, levofloxacin, and ceftriaxone. She was transferred to [facility] for further workup and management. Currently in bed, no chest pain, dyspnea improving, orthopnea improving, edema improving.      We were asked to consult for work up and evaluation of the above problems. No past medical history on file. No past surgical history on file. Allergies   Allergen Reactions    Pcn [Penicillins] Not Reported This Time     Tolerated ceftriaxone 10/2021    Sulfa (Sulfonamide Antibiotics) Not Reported This Time      Meds:  See below  Social History     Tobacco Use    Smoking status: Not on file   Substance Use Topics    Alcohol use: Not on file      No family history on file. REVIEW OF SYSTEMS:     []         Unable to obtain  ROS due to ---   [x]         Total of 12 systems reviewed as follows: Total of 12 systems reviewed as follows:       POSITIVE= Bold text  Negative = normal text  General:  fever, chills, sweats, generalized weakness, weight loss/gain,      loss of appetite   Eyes:    blurred vision, eye pain, loss of vision, double vision  ENT:    rhinorrhea, pharyngitis   Respiratory:   cough, sputum production, SOB, MATTA, wheezing, pleuritic pain   Cardiology:   chest pain, palpitations, orthopnea, PND, edema, syncope   Gastrointestinal:  abdominal pain , N/V, diarrhea, dysphagia, constipation, bleeding   Genitourinary:  frequency, urgency, dysuria, hematuria, incontinence   Muskuloskeletal :  arthralgia, myalgia, back pain  Hematology:  easy bruising, nose or gum bleeding, lymphadenopathy   Dermatological: rash, ulceration, pruritis, color change / jaundice  Endocrine:   hot flashes or polydipsia   Neurological:  headache, dizziness, confusion, focal weakness, paresthesia,     Speech difficulties, memory loss, gait difficulty  Psychological: Feelings of anxiety, depression, agitation    Objective:      Physical Exam:    Last 24hrs VS reviewed since prior progress note.  Most recent are:    Visit Vitals  BP (!) 153/72 (BP 1 Location: Left upper arm, BP Patient Position: At rest)   Pulse 87   Temp 98.4 °F (36.9 °C)   Resp 19   Ht 5' 4\" (1.626 m)   Wt 76.7 kg (169 lb)   SpO2 95%   Breastfeeding No   BMI 29.01 kg/m²       Intake/Output Summary (Last 24 hours) at 10/29/2021 1146  Last data filed at 10/28/2021 1709  Gross per 24 hour   Intake    Output 500 ml   Net -500 ml        General Appearance: Well developed, elderly, alert & oriented x 3,    no acute distress. Ears/Nose/Mouth/Throat: Pupils equal and round, Hearing grossly normal.  Neck: Supple. JVP within normal limits. Carotids good upstrokes, with no bruit. Chest: Lungs clear to auscultation bilaterally. Cardiovascular: Regular rate and rhythm, S1S2 normal, II/VI systolic murmur, rubs, gallops. Abdomen: Soft, non-tender, bowel sounds are active. No organomegaly. Extremities: +1 edema bilaterally. Femoral pulses +2, Distal Pulses +1. Skin: Warm and dry. Neuro: CN II-XII grossly intact, Strength and sensation grossly intact. Data:      Prior to Admission medications    Medication Sig Start Date End Date Taking? Authorizing Provider   albuterol (PROVENTIL VENTOLIN) 2.5 mg /3 mL (0.083 %) nebu INHALE 3ML BY NEBULIZATION 4 TIMES A DAY AS NEEDED 8/31/21  Yes Provider, Historical   amLODIPine (NORVASC) 5 mg tablet Take 5 mg by mouth daily. 3/3/21  Yes Provider, Historical   atenoloL (TENORMIN) 25 mg tablet Take 25 mg by mouth daily. 10/1/21  Yes Provider, Historical   diclofenac (VOLTAREN) 1 % gel Apply  to affected area. 5/5/21  Yes Provider, Historical   escitalopram oxalate (LEXAPRO) 10 mg tablet Take 10 mg by mouth daily. 9/28/21 3/27/22 Yes Provider, Historical   albuterol-ipratropium (DUO-NEB) 2.5 mg-0.5 mg/3 ml nebu Take 3 mL by inhalation. 6/14/21 6/14/22 Yes Provider, Historical   ketoconazole (NIZORAL) 2 % shampoo Shampoo with weekly 1-2 times. 7/29/21  Yes Provider, Historical   montelukast (SINGULAIR) 10 mg tablet Take 10 mg by mouth At bedtime. 1/7/21  Yes Provider, Historical   pantoprazole (PROTONIX) 40 mg tablet Take 40 mg by mouth daily. 10/18/21  Yes Provider, Historical   polyethylene glycol (MIRALAX) 17 gram/dose powder 1 scoop po daily in a drink prn constipation.  7/29/21  Yes Provider, Historical   rOPINIRole (REQUIP) 3 mg tab tab TAKE 1 TABLET FOUR TIMES A DAY 12/29/20  Yes Provider, Historical   valsartan (DIOVAN) 320 mg tablet Take 320 mg by mouth daily. 2/15/21  Yes Provider, Historical   zolpidem (AMBIEN) 10 mg tablet 1/2 to 1 po qhs prn insomnia. 9/17/21  Yes Provider, Historical   apixaban (ELIQUIS) 5 mg tablet Take 5 mg by mouth two (2) times a day. Yes Provider, Historical   fluticasone furoate-vilanteroL (Breo Ellipta) 100-25 mcg/dose inhaler Take 1 Puff by inhalation daily. Yes Provider, Historical   acetaminophen (TYLENOL) 325 mg tablet Take (1/2) tablet in the morning and (1/2) tablet at nights   Yes Provider, Historical   cyanocobalamin, vitamin B-12, (VITAMIN B12 PO) Take  by mouth.    Yes Provider, Historical   ondansetron hcl (ZOFRAN) 4 mg tablet TAKE 1 TABLET(4 MG) BY MOUTH EVERY 8 HOURS FOR UP TO 10 DAYS AS NEEDED FOR NAUSEA OR VOMITING 7/22/21   Provider, Historical       Recent Results (from the past 24 hour(s))   ECHO ADULT COMPLETE    Collection Time: 10/28/21  3:58 PM   Result Value Ref Range    LVOT Peak Gradient 4.06 mmHg    Left Ventricular Outflow Tract Mean Gradient 2.54 mmHg    LVOT Peak Velocity 100.70 cm/s    LVOT VTI 22.91 cm    AV R PG 67.88 mmHg    Aortic Regurgitant Pressure Half-time 489.85 ms    AR Max Cristopher 411.94 cm/s    AoV PG 39.67 mmHg    Aortic Valve Systolic Mean Gradient 80.73 mmHg    Aortic Valve Systolic Peak Velocity 794.01 cm/s    AoV VTI 71.30 cm    MV A Cristopher 28.14 cm/s    Mitral Valve E Wave Deceleration Time 167.10 ms    MV E Cristopher 128.46 cm/s    E/E' ratio (averaged) 19.31     E/E' lateral 14.95     E/E' septal 23.66     LV E' Lateral Velocity 8.59 cm/s    LV E' Septal Velocity 5.43 cm/s    TV MG 89.18 mmHg    TR Max Velocity 583.18 cm/s    Mitral Regurgitant Velocity Time Integral 219.94 cm    MV Peak Gradient 11.24 mmHg    MV Mean Gradient 3.71 mmHg    Mitral Valve Pressure Half-time 55.66 ms    Mitral Valve Max Velocity 167.61 cm/s Mitral Valve Annulus Velocity Time Integral 30.22 cm    MVA (PHT) 3.95 cm2    Pulmonic Valve Systolic Peak Instantaneous Gradient 6.97 mmHg    Pulmonic Valve Max Velocity 132.02 cm/s    Triscuspid Valve Regurgitation Peak Gradient 44.00 mmHg    TV MG 29.23 mmHg    TR Max Velocity 331.66 cm/s    Tricuspid Valve Regurgitation Velocity Time Interval 117.89 cm    MV E/A 4.57    GLUCOSE, POC    Collection Time: 10/28/21  4:24 PM   Result Value Ref Range    Glucose (POC) 204 (H) 65 - 117 mg/dL    Performed by Celio Huang PCT    GLUCOSE, POC    Collection Time: 10/28/21 10:31 PM   Result Value Ref Range    Glucose (POC) 194 (H) 65 - 117 mg/dL    Performed by Walt Haque RN    CBC WITH AUTOMATED DIFF    Collection Time: 10/29/21  5:14 AM   Result Value Ref Range    WBC 16.8 (H) 3.6 - 11.0 K/uL    RBC 4.12 3.80 - 5.20 M/uL    HGB 13.0 11.5 - 16.0 g/dL    HCT 38.8 35.0 - 47.0 %    MCV 94.2 80.0 - 99.0 FL    MCH 31.6 26.0 - 34.0 PG    MCHC 33.5 30.0 - 36.5 g/dL    RDW 12.6 11.5 - 14.5 %    PLATELET 845 (L) 629 - 400 K/uL    MPV 12.4 8.9 - 12.9 FL    NRBC 0.0 0  WBC    ABSOLUTE NRBC 0.00 0.00 - 0.01 K/uL    NEUTROPHILS 85 (H) 32 - 75 %    LYMPHOCYTES 6 (L) 12 - 49 %    MONOCYTES 8 5 - 13 %    EOSINOPHILS 0 0 - 7 %    BASOPHILS 0 0 - 1 %    IMMATURE GRANULOCYTES 1 (H) 0.0 - 0.5 %    ABS. NEUTROPHILS 14.3 (H) 1.8 - 8.0 K/UL    ABS. LYMPHOCYTES 1.0 0.8 - 3.5 K/UL    ABS. MONOCYTES 1.3 (H) 0.0 - 1.0 K/UL    ABS. EOSINOPHILS 0.0 0.0 - 0.4 K/UL    ABS. BASOPHILS 0.0 0.0 - 0.1 K/UL    ABS. IMM.  GRANS. 0.2 (H) 0.00 - 0.04 K/UL    DF AUTOMATED     METABOLIC PANEL, BASIC    Collection Time: 10/29/21  5:14 AM   Result Value Ref Range    Sodium 133 (L) 136 - 145 mmol/L    Potassium 4.5 3.5 - 5.1 mmol/L    Chloride 97 97 - 108 mmol/L    CO2 28 21 - 32 mmol/L    Anion gap 8 5 - 15 mmol/L    Glucose 156 (H) 65 - 100 mg/dL    BUN 73 (H) 6 - 20 MG/DL    Creatinine 2.21 (H) 0.55 - 1.02 MG/DL    BUN/Creatinine ratio 33 (H) 12 - 20 GFR est AA 25 (L) >60 ml/min/1.73m2    GFR est non-AA 21 (L) >60 ml/min/1.73m2    Calcium 9.2 8.5 - 10.1 MG/DL   GLUCOSE, POC    Collection Time: 10/29/21  7:36 AM   Result Value Ref Range    Glucose (POC) 162 (H) 65 - 117 mg/dL    Performed by Firstmonie Apa PCT    GLUCOSE, POC    Collection Time: 10/29/21 11:32 AM   Result Value Ref Range    Glucose (POC) 137 (H) 65 - 117 mg/dL    Performed by [employer] PCT      Palmyra Arrow  80year old F (03/07/1927)  Account #: [de-identified]  PRIMARY CARE PHYSICIAN:  Desi Shepherd MD  CARDIOLOGIST:  Manfred Graves MD    REASON FOR VISIT:  This 80year old female presents for Valvular Heart Disease. HISTORY OF PRESENT ILLNESS:   1. AS Critical - Resolved S/P TAVR July, 2013  2. Left Knee replaced June 18, 2012. Did well.  3. S/P TAVR - 23 mm JANINE valve. NCT registration number [id number]. 7/18/13  4. Bronchitis Jan - Feb 2014  5. Pneumonia April, 2015  6. Malignant Hypertension April 2015  7. UTI  Recurrent  8. GERD  9. Macular Degeneration  10. Angina    Confined to a wheelchair. No chest discomfort. No orthopnea. Very alert. Arthritis is CC. CARDIAC HISTORY  VALVULAR:  1 Severe AS [Tissue AVR (#23)] - 7/13/2013     RISK FACTORS:  1 Hypertension       CARDIOVASCULAR PROCEDURES  Procedure Date Results   Abdominal Ao CTA 06/19/2013    Cardiac CTA 06/19/2013 Calcium score 408.00   CV Surgery 07/13/2013 Tissue AVR (#23) TAVR   Echo 03/27/2019 EF: .50, Hypertrophic left ventricle with low normal systolic and mildly impaired diastolic function. Normal right ventricle. Severe left atrial dilatation. Stable appearing TAVR valve with appropriate gradients and mild transvalvular regurgitation. Moderate mitral and mild tricuspid regurgitation.    Echo 04/21/2015 EF 0.45 (45%), Mild-Moderate Pulmonary HTN, Mild AS, Mild TR, Moderate LVH, Post TAVR mean 24mmHg   Echo 05/11/2012 EF 0.65, LAE, Mild MR, Severe AS   EKG 04/19/2012 Sinus Rhythm       ACTIVE ALLERGIES:  Ingredient Reaction Comment   SULFAMETHOXAZOLE Rash    DOXYCYCLINE CALCIUM rash/hives    DOXYCYCLINE HYCLATE rash/hives    PREDNISOLONE (severe)    RESERPINE (severe)    PENICILLIN V (severe)    TETRACYCLINE (severe)    DOXYCYCLINE MONOHYDRATE rash/hives        PROBLEM LIST:  Problem Description Chronic   Benign essential HTN Y   AS Y       PAST MEDICAL/SURGICAL HISTORY  (Detailed)    Disease/disorder Onset Date Surgical History Date Comments     Hysterectomy       Tonsillectomy       Cholecystectomy     Diabetes       DJD       esophageal stricture       GERD       Hypertension       Murmur       osteoporosis       peripheral edema       Valvular disease         Family History  (Detailed)  Relationship Family Member Name  Age at Death Condition Onset Age Cause of Death       No family history of Coronary artery disease, premature  N     SOCIAL HISTORY  (Detailed)  Tobacco use reviewed. Preferred language is [place]. EDUCATION/EMPLOYMENT/OCCUPATION  Employment History Status Retired Restrictions     retired       retired       retired       MARITAL STATUS/FAMILY/SOCIAL SUPPORT  Currently . 0 son(s). 0 daughter(s). Smoking status: Never smoker. ALCOHOL  There is no history of alcohol use. CAFFEINE  The patient does not use caffeine. Jeremie Bryant LIFESTYLE  Sedentary activity level. REVIEW OF SYMPTOMS:    CONST - Negative for weight gain, weight loss, fever. EYES - Negative for visual changes. ENT - Negative for hearing loss. RESP - Negative for snoring, hemoptysis, dyspnea. CARD - Negative for chest pain, diaphoresis, orthopnea, palpitation, syncope, PND.  VASC - Negative for claudication, edema. GI - Negative for nausea, reflux, bleeding.  - Negative for hematuria, nocturia. REPROD - Negative for Hx of OCP.  ENDO - Negative for goiter, tremors. NEURO - Negative for dizziness, memory loss, seizures. PSYCH - Negative for depression, hallucinations.   DERM - Negative for rash, skin sores. M/S - Negative for joint pain, myalgia. HEMAT - Negative for acute anemia, thrombocytopenia. VITAL SIGNS  Time BP mm/Hg Pulse /min Resp /min Temp F Ht ft Ht in Ht cm Wt lb Wt kg BMI kg/m2 BSA m2 O2 Sat%   2:13 /70 69   5.0 6.00 167.64     97       PHYSICAL EXAM:  Exam Findings Details   Const Neg Level of Distress - Awake / Alert, No Acute Distress, Happy / [place]. Appearance - Well Developed. Const Pos Nourishment - Overweight. Appearance - Younger Than Stated. Chest Neg Sternum - Stable. Pectus Excavatum - Absent. Pectus Carinatum - Absent. Resp Neg Respirations - Nonlabored. Breath Sounds - Clear Throughout. Rales - Absent. Rhonchi - Absent. Wheezes - Absent. Cardiac Neg Rhythm - Regular. Palpation - PMI Normal.  Heart Sounds - S1 Normal, S2 Normal, No S3, No S4.   Cardiac Pos Extra Sounds -  Prosthetic Sounds Crisp. Murmurs - II/VI systolic ejection murmur heard at the right upper sternal border. Vasc Neg Posterior Tibial - Bilateral Normal Pulse. M/S Neg Able to Exercise - Yes. M/S Pos Gait - Uses Cane. EXT Neg Lower Extremity Edema - Absent. Neuro Neg Level of Consciousness - Alert. Psych Neg Orientation - Oriented to Time, Person, Place. Mood - Appropriate. IMPRESSION AND PLAN  01. Presence of prosthetic heart valve (Z95.2): This condition is stable. I have made no changes to current medications and will continue current prescriptive medications. 2-D Echo to be done before next visit. Return for follow-up visit in one year. 02. Other chest pain (R07.89): Improved since last seen. ECG done   03. Essential (primary) hypertension (I10): Well controlled on medical therapy. I have made no changes to the present regimen. 04. 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 05. Primary generalized (osteo)arthritis (M15.0): This condition is stable. 06.  Gastro-esophageal reflux disease without esophagitis (K21.9):  Chronic.   07. Long term (current) use of aspirin (Z79.82)   08. Long term (current) use of antithrombotics/antiplatelets (B68.90)   09. Urinary tract infection, site not specified (N39.0):  Recurrent. 10. Palpitations (R00.2)     ORDERS:  1 ECG done    2 2-D Echocardiogram    3 Return for follow-up visit in one year.         FINAL MEDICATION LIST  Medication Sig Desc Non-VCS   amlodipine 5 mg tablet take 1 tablet by oral route  every day N   ATENOLOL 25MG TABLETS TAKE 1 TABLET BY MOUTH ONCE DAILY N   Breo Ellipta 100 mcg-25 mcg/dose powder for inhalation inhale 1 puff by inhalation route  every day at the same time each day Y   Diovan 320 mg tablet take 1 tablet (320MG)  by oral route  every day N   ELIQUIS TABS 2.5MG TAKE 1 TABLET TWICE A DAY N   furosemide 20 mg tablet take 1 Tablet by oral route  every day N   ipratropium 0.5 mg-albuterol 3 mg (2.5 mg base)/3 mL nebulization soln inhale 3 milliliter by nebulization route 4 times every day Y   melatonin 10 mg capsule take 1 by Oral route  every bedtime Y   montelukast 10 mg tablet take 1 tablet by oral route  every day in the evening Y   nitroglycerin 0.4 mg sublingual tablet place 1 tablet by sublingual route once as needed for CP, may repeat every 5min up to three N   Ocuvite tablet take 1 by Oral route  every day Y   pantoprazole 40 mg tablet,delayed release take 1 tablet by oral route  every day Y   ProAir HFA 90 mcg/actuation aerosol inhaler inhale 2 puff by inhalation route  every 4 - 6 hours as needed Y   Requip 3 mg Tab take 1 tablet (3MG)  by oral route 3 times every day N   Tylenol Ex Str Arthritis Pain 500 mg tablet take 1 tablet by oral route  every 6 hours as needed Y   Vesicare 10 mg tablet take 1 tablet (10MG)  by oral route  every day Y   Vitamin B-12 1,000 mcg tablet take 1 by Oral route  every day Y   Vitamin C 500 mg tablet take 1 by Oral route 2 times every day Y   Vitamin D3 1,000 unit tablet take 1 by Oral route  every day Y zolpidem 10 mg tablet take 1 tablet by oral route  every day at bedtime Y   Zyrtec 10 mg tablet take 1 tablet by oral route  every day Y palpitations

## 2022-10-06 NOTE — PROGRESS NOTES
Nephrology Progress Note  Aquilino Stiles     [facility]. [facility] [employer]  Phone - [phone]   Patient: Nam Gold    YOB: 1927        Date- 11/4/2021   Admit Date: 10/26/2021  CC: Follow up for ROCÍO       IMPRESSION & PLAN:   · ROCÍO   · CHF  · Hyponatremia due to chf  · CAD 3b- bl cr 1.5  · Hypertension  · Sob- pulmonary edema  · afib with RVR  ·  Atrophic right kidney  · Left hydronephrosis - chronic  · Chronic Back pain    PLAN-   Continue Lasix 40 mg daily   Hold diovan   Urology input noted   Okay to d/c renal stand point   Consider Hospice      Subjective: Interval History:   -cr stable  Na 134 yesterday  No new labs today   D/w family at bedside     Objective:   Vitals:    11/04/21 0313 11/04/21 0758 11/04/21 0822 11/04/21 0824   BP: (!) 114/37 (!) 128/44     Pulse: 83 84     Resp: 20 18     Temp: 98.2 °F (36.8 °C) 99 °F (37.2 °C)     SpO2: 98% 97% 96% 98%   Weight:       Height:          11/03 0701 - 11/04 0700  In: -   Out: 1100 [Urine:1100]  Last 3 Recorded Weights in this Encounter    10/28/21 1221 10/30/21 0520 11/03/21 0626   Weight: 76.7 kg (169 lb) 73.3 kg (161 lb 9.6 oz) 73.6 kg (162 lb 4.8 oz)      Physical exam:   GEN: NAD  NECK- Supple, no mass  RESP: No wheezing, decreased air movement b/l  CVS: S1,S2  , rrr  EXT: + edema  PSYCH:Can't access due to patient's current condition       Chart reviewed. Pertinent Notes reviewed. Data Review :  Recent Labs     11/03/21  0517 11/02/21  0648   * 135*   K 4.2 4.1   CL 96* 96*   CO2 36* 36*   BUN 68* 76*   CREA 1.32* 1.52*   * 135*   CA 9.4 9.2     Recent Labs     11/04/21  0412 11/03/21  0517 11/02/21  0648   WBC 8.3 8.0 8.5   HGB 11.8 11.4* 11.5   HCT 35.6 35.0 35.5   * 111* 87*     No results for input(s): FE, TIBC, PSAT, FERR in the last 72 hours.    Medication list  reviewed  Current Facility-Administered Medications   Medication Dose Route Frequency    furosemide (LASIX) tablet Patient is returning call.  Okay to leave a detailed message.       40 mg  40 mg Oral DAILY    docusate sodium (COLACE) capsule 100 mg  100 mg Oral DAILY PRN    prochlorperazine (COMPAZINE) with saline injection 5 mg  5 mg IntraVENous Q6H PRN    methocarbamoL (ROBAXIN) tablet 750 mg  750 mg Oral TID PRN    oxyCODONE IR (ROXICODONE) tablet 5 mg  5 mg Oral Q4H PRN    budesonide (PULMICORT) 250 mcg/2ml nebulizer susp  250 mcg Nebulization BID RT    And    arformoteroL (BROVANA) neb solution 15 mcg  15 mcg Nebulization BID RT    morphine injection 1 mg  1 mg IntraVENous Q4H PRN    melatonin tablet 6 mg  6 mg Oral QHS PRN    metoprolol (LOPRESSOR) injection 5 mg  5 mg IntraVENous Q6H PRN    lidocaine 4 % patch 1 Patch  1 Patch TransDERmal Q24H    sodium chloride (NS) flush 5-40 mL  5-40 mL IntraVENous Q8H    sodium chloride (NS) flush 5-40 mL  5-40 mL IntraVENous PRN    acetaminophen (TYLENOL) tablet 650 mg  650 mg Oral Q6H PRN    Or    acetaminophen (TYLENOL) suppository 650 mg  650 mg Rectal Q6H PRN    polyethylene glycol (MIRALAX) packet 17 g  17 g Oral DAILY PRN    ondansetron (ZOFRAN) injection 4 mg  4 mg IntraVENous Q6H PRN    pantoprazole (PROTONIX) tablet 40 mg  40 mg Oral DAILY    montelukast (SINGULAIR) tablet 10 mg  10 mg Oral QHS    [Held by provider] polyethylene glycol (MIRALAX) packet 17 g  17 g Oral DAILY    rOPINIRole (REQUIP) tablet 3 mg  3 mg Oral TID    glucose chewable tablet 16 g  4 Tablet Oral PRN    dextrose (D50W) injection syrg 12.5-25 g  12.5-25 g IntraVENous PRN    glucagon (GLUCAGEN) injection 1 mg  1 mg IntraMUSCular PRN    atenoloL (TENORMIN) tablet 25 mg  25 mg Oral DAILY    apixaban (ELIQUIS) tablet 2.5 mg  2.5 mg Oral Q12H    albuterol (PROVENTIL VENTOLIN) nebulizer solution 2.5 mg  2.5 mg Nebulization Q4H PRN    insulin lispro (HUMALOG) injection   SubCUTAneous AC&HS    influenza vaccine 2021-22 (6 mos+)(PF) (FLUARIX/FLULAVAL/FLUZONE QUAD) injection 0.5 mL  1 Each IntraMUSCular PRIOR TO DISCHARGE          Ángel Ingram, MD [facility]  [facility] / [facility], [address]  [place], [address]  Phone - [phone]               Fax - [phone]